# Patient Record
Sex: FEMALE | NOT HISPANIC OR LATINO | URBAN - METROPOLITAN AREA
[De-identification: names, ages, dates, MRNs, and addresses within clinical notes are randomized per-mention and may not be internally consistent; named-entity substitution may affect disease eponyms.]

---

## 2020-08-31 ENCOUNTER — TRANSFERRED RECORDS (OUTPATIENT)
Dept: HEALTH INFORMATION MANAGEMENT | Facility: CLINIC | Age: 35
End: 2020-08-31

## 2020-10-30 ENCOUNTER — TRANSFERRED RECORDS (OUTPATIENT)
Dept: HEALTH INFORMATION MANAGEMENT | Facility: CLINIC | Age: 35
End: 2020-10-30

## 2021-01-08 ENCOUNTER — TRANSFERRED RECORDS (OUTPATIENT)
Dept: HEALTH INFORMATION MANAGEMENT | Facility: CLINIC | Age: 36
End: 2021-01-08

## 2021-03-17 ENCOUNTER — TRANSFERRED RECORDS (OUTPATIENT)
Dept: HEALTH INFORMATION MANAGEMENT | Facility: CLINIC | Age: 36
End: 2021-03-17

## 2021-04-07 ENCOUNTER — TRANSFERRED RECORDS (OUTPATIENT)
Dept: HEALTH INFORMATION MANAGEMENT | Facility: CLINIC | Age: 36
End: 2021-04-07

## 2021-04-14 ENCOUNTER — TELEPHONE (OUTPATIENT)
Dept: ENDOCRINOLOGY | Facility: CLINIC | Age: 36
End: 2021-04-14

## 2021-04-14 NOTE — TELEPHONE ENCOUNTER
Patient is a patient of Dr. Keenan's at [facility]. She is calling to schedule her surgery here because Dr. Keenan is booking out to October in [place]. I told her I could give her a tentative date of June 8 but she asked if she could be moved up if an opening came up.   Her last name is Krystal. Will request the chart be updated with correct spelling.

## 2021-04-28 ENCOUNTER — CARE COORDINATION (OUTPATIENT)
Dept: ENDOCRINOLOGY | Facility: CLINIC | Age: 36
End: 2021-04-28

## 2021-04-28 ENCOUNTER — PREP FOR PROCEDURE (OUTPATIENT)
Dept: ENDOCRINOLOGY | Facility: CLINIC | Age: 36
End: 2021-04-28

## 2021-04-28 ENCOUNTER — TELEPHONE (OUTPATIENT)
Dept: ENDOCRINOLOGY | Facility: CLINIC | Age: 36
End: 2021-04-28

## 2021-04-28 ENCOUNTER — PREP FOR PROCEDURE (OUTPATIENT)
Dept: SURGERY | Facility: CLINIC | Age: 36
End: 2021-04-28

## 2021-04-28 DIAGNOSIS — E66.01 MORBID OBESITY (H): Primary | ICD-10-CM

## 2021-04-28 DIAGNOSIS — Z01.818 PREOP TESTING: Primary | ICD-10-CM

## 2021-04-28 DIAGNOSIS — E66.01 MORBID OBESITY (H): ICD-10-CM

## 2021-04-28 RX ORDER — CEFAZOLIN SODIUM IN 0.9 % NACL 3 G/100 ML
3 INTRAVENOUS SOLUTION, PIGGYBACK (ML) INTRAVENOUS
Status: CANCELLED | OUTPATIENT
Start: 2021-04-28

## 2021-04-28 NOTE — PROGRESS NOTES
"Tasklist updated.    Bariatric Task List  Status:  Is patient a candidate for bariatric surgery?:  patient is a candidate for bariatric surgery -     Cleared to schedule surgeon consult?:  cleared to schedule surgeon consult -     Status:  surgery evaluation in process -     Surgeon: Dr Augusto Keenan -     Tentative surgery month/year: 5/11/2021 -        Insurance: Insurance:  [employer] -        Patient Info: Initial Weight:  235 -     Date of Initial Weight/Height:  10/30/2020 -     Goal Weight (lbs):  225 -     Required Weight Loss:  10 -     Surgery Type:  sleeve gastrectomy -        Dietician Visits: Structured weight loss required by insurance?:  structured weight loss required -     Dietician Visit 1:  Completed - 10/30/20   Dietician Visit 2:  Completed - 11/23/20   Dietician Visit 3:  Completed - 12/23/20   Dietician Visit 4:  Completed - 1/28/21   Dietician Visit 5:  Completed - 2/12/21   Dietician Visit 6:  Completed - 3/12/21   Dietician Visit additional:    - Monthly until at goal weight or for postop diet teaching. Call [phone] to schedule. bks      Psychological Evaluation: Psych eval:  Completed - 3/17/21 Dr Verdin - cleared. bks      Lab Work: Complete Blood Count:  Completed - 10/30/20 done   Comprehensive Metabolic Panel:  Needed -     Vitamin D:  Completed - 10/30/20 done   Hgb A1c:  Needed -     PTH:  Needed -     Other:  Needed - lipids      PCP: PCP letter of support:  Completed -  1/8/21 PCP ltr of support Rose Mary Evans PA-C, in Epic.      Patient Education:  Information Session:    -     Given \"Making your decision\" handout?:    -     Given support group information?:    -     Attended support group?:    -     Support plan in place?:    -        Additional Surgery Requirements: Other Requirements:  covide test 4 days before surgery. Call [phone] to schedule closer to home. bks -     Other Requirements:  The Contact Center to call you to schedule the postop appts for 1 week and 31+ " days. bks -        Final Tasks:  Before surgery online class:  Needed -     Before surgery online class website link:  [email or web address]   After surgery online class:  Needed -     After surgery online class website link:  [email or web address]   Nurse visit for weigh-in and information:  Needed -     Pre-assessment clinic visit with anesthesia team for H&P:  Needed - Call [phone] to schedule. bks   Final labs (Hgb, plt, T&S, UA):  Needed -        Notes: Please register for the Get Well Loop when you get an email invitation and a surgery date.     The Get Well Loop will give you information via email or text messages that can help you be more successful before and after surgery.  It can also help ans,wer any questions you may have.    Get Well Loop Information  [email or web address]    Thank you,  Your Comprehensive Weight Management Care Team  Call [place] ph: [phone] for appointments or to leave messages   -

## 2021-04-28 NOTE — TELEPHONE ENCOUNTER
I gave patient a tentative date of June 8 for a sleeve gastrectomy with Dr. Keenan. Patient requested to be moved up if at all possible because she is planning a trip in July. Left VM message that I have an opening on May 11 and requested patient to call me if she could take that date. She would have to get a pre-op H&P with her primary care provider because the soonest I can get her in with PAC is May 14. My direct call back number left.

## 2021-04-29 ENCOUNTER — TELEPHONE (OUTPATIENT)
Dept: ENDOCRINOLOGY | Facility: CLINIC | Age: 36
End: 2021-04-29

## 2021-04-29 DIAGNOSIS — E66.01 MORBID OBESITY (H): Primary | ICD-10-CM

## 2021-04-29 DIAGNOSIS — Z11.59 ENCOUNTER FOR SCREENING FOR OTHER VIRAL DISEASES: ICD-10-CM

## 2021-04-29 NOTE — TELEPHONE ENCOUNTER
I entered the incorrect date for patient to get the COVID testing done, should be done Friday, May 7 not May 11.

## 2021-04-29 NOTE — TELEPHONE ENCOUNTER
Patient called back with an  stating she would like to take the surgery date of 5/11 for sleeve gastrectomy with Dr. Keenan. Called [facility] to get fax number, [phone], patient would like to get these drawn today, however, due to the lateness of getting lab faxed over may have to do tomorrow instead.   Discussed the need for COVID-19 testing on Friday, May 11 at [place] as well. This is 4 days before surgery date.

## 2021-04-30 ENCOUNTER — TRANSFERRED RECORDS (OUTPATIENT)
Dept: HEALTH INFORMATION MANAGEMENT | Facility: CLINIC | Age: 36
End: 2021-04-30

## 2021-05-03 NOTE — PROGRESS NOTES
"Karina Rice is a 36 year old female who is being evaluated via a billable telephone visit.     The patient has been notified of following:     \"This telephone visit will be conducted via a call between you and your physician/provider. We have found that certain health care needs can be provided without the need for a physical exam.  This service lets us provide the care you need with a short phone conversation.  If a prescription is necessary we can send it directly to your pharmacy.  If lab work is needed we can place an order for that and you can then stop by our lab to have the test done at a later time.    Telephone visits are billed at different rates depending on your insurance coverage. During this emergency period, for some insurers they may be billed the same as an in-person visit.  Please reach out to your insurance provider with any questions.    If during the course of the call the physician/provider feels a telephone visit is not appropriate, you will not be charged for this service.\"    Patient has given verbal consent for Telephone visit?  Yes    What phone number would you like to be contacted at? [phone]    How would you like to obtain your AVS? Mail    Phone call duration: 58 minutes    During this virtual visit the patient is located in [place], patient verifies this as the location during the entirety of this visit.     Bariatric Nutrition Consultation Note    Reason For Visit: Nutrition assessment    Karina Rice is a 36 year old presenting today for bariatric nutrition consult and nutrition education regarding clear and low-fat full liquid diet for post-bariatric surgery.   Pt is interested in laparoscopic sleeve gastrectomy with Dr. Keenan on May 11, 2021.  Patient is accompanied by Turkmen  via phone.  Patient of Dr. Keenan's at [facility]. She has completed required nutrition visits prior to surgery. Pt referred by Dr. Keenan.     Patient with Co-morbidities of obesity " including:  none    ANTHROPOMETRICS:  Initial Consult Weight (10/30/20): 235 lbs with BMI of 42.98  Current Weight: 232 lbs    Required weight loss goal pre-op: 10 lbs from initial consult weight (goal weight 225 lbs or less before surgery)    Diet History:  Pt reports no history of receiving clear and low-fat full liquid diet education after bariatric surgery in the past.  Has a protein powder at home (140 anthony/serving) she can use for before and after surgery.   Pt does not eat pork, or pork products (gelatin).  Pt states she had gotten her weight down to 225 lbs, thought surgery was going to be post-poned and gained a few lbs back.  Pt confident she will be able to lose a few more lbs prior to surgery.     SUPPLEMENT INFORMATION:  Vitamin D3 to treat mild vitamin D deficiency. Collagen supplement.     NUTRITION DIAGNOSIS:  Obesity r/t long history of self-monitoring deficit and excessive energy intake aeb BMI >30 kg/m2.  And  Food- and Nutrition-related knowledge deficit r/t lack of prior exposure to information AEB pt unable to verbalize main points of bariatric clear and low-fat full liquid diet.    INTERVENTION:  Intervention Provided/Education Provided:   Advised pt to start the pre-op Full Liquid Diet. Provided instruction on this diet.   Advised pt to stop vitamin/mineral supplement intake now (one week before surgery).    Provided instruction on bariatric clear and low-fat full liquid diets.  Provided the following handouts: Diet Guidelines for Bariatric Surgery, Stage 1-5 post-op diets, Keeping Track of Your Fluids, list of recommended vitamin/mineral supplementation after SG surgery and RD contact information. Mailed pt handouts with before and after surgery goals.     Patient Understanding: good  Expected Compliance: good    Goals before surgery:  Start the Full Liquid Diet: 5 protein shakes per day  - water, or other calorie-free beverages between.     Goals after surgery:   1. Follow the bariatric  post-op diet advancement schedule:  - Bariatric clear liquid diet the day before surgery and day of surgery (while in the hospital).   - Bariatric low-fat full liquid diet on post-op days 1 through 13 (2 weeks).   2. Sip on 48-64 oz (or greater) fluids daily, recording intake to help stay on-track.  - Drink at least 1-2 oz of fluid every 15-30 min.  3. Stop multivitamin at surgery. We will discuss starting a chewable multivitamin at the one week post-op visit.   Chewable Multivitamin without gelatin to start after surgery:  Bariatric Fusion - Complete Chewable Multivitamin with Iron   - 2 chews per day provides 22 mg iron  - https://www.[email or web address]/collections/complete-chewable/products/mixed-berry-complete-chewable-multivitamin  Post-op Diet Schedule:  Clear Liquids (Stage 1): 5/10 - the day before surgery  Full Liquid Diet (Stage 2): 5/12 - 5/24 - 2 weeks  Pureed Diet (Stage 3): 5/25 - 6/7 - 2 weeks  Soft Diet (Stage 4): 6/8 - 7/5 - 1 month  Regular (Stage 5): 7/6     Meal Replacement Shake Options:   *Protein Shake Criteria: no more than 210 Calories, at least 20 grams of protein, and less than 10 grams of sugar   [facility] smoothie (160 Calories, 20 g protein)   Premier Protein (160 Calories, 30 g protein)  Slim Fast Advanced Nutrition (180 Calories, 20 g protein)  Muscle Milk, lactose-free, 17 oz bottle (210 Calories, 30 g protein)  Integrated Supplements, no artificial sugars (110 Calories, 20 g protein)  Genepro, unflavored protein powder (60 Calories, 30 g protein)  Boost/Ensure Max (160 calories, 30 gm protein)   [facility] Core Power (170 calories, 26 gm protein)  Post-op Diet Handouts:  Diet Guidelines after Weight-loss Surgery  [email or web address]     Your Stage 1 Diet: Clear Liquids  [email or web address]     Your Stage 2 Diet: Low-fat Full Liquids  [email or web address]     Your Stage 3 Diet: Pureed Foods  [email or web address]     Pureed  Pleasures  [email or web address]    Your Stage 4 Diet: Soft Foods  [email or web address]    Your Stage 5 Diet: Regular Foods  [email or web address]    Supplements after Weight Loss Surgery  [email or web address]     Keeping Track of Fluids  [email or web address]      Tia Bass RD, LD

## 2021-05-04 ENCOUNTER — VIRTUAL VISIT (OUTPATIENT)
Dept: ENDOCRINOLOGY | Facility: CLINIC | Age: 36
End: 2021-05-04
Payer: COMMERCIAL

## 2021-05-04 DIAGNOSIS — Z71.3 NUTRITIONAL COUNSELING: Primary | ICD-10-CM

## 2021-05-04 DIAGNOSIS — E66.9 OBESITY: ICD-10-CM

## 2021-05-04 PROCEDURE — 97803 MED NUTRITION INDIV SUBSEQ: CPT | Mod: TEL | Performed by: DIETITIAN, REGISTERED

## 2021-05-04 NOTE — LETTER
"5/4/2021       RE: Karina Rice  [address]  [facility] [address]     Dear Colleague,    Thank you for referring your patient, Karina Rice, to the [facility] [place] [facility]. Please see a copy of my visit note below.    Karina Rice is a 36 year old female who is being evaluated via a billable telephone visit.     The patient has been notified of following:     \"This telephone visit will be conducted via a call between you and your physician/provider. We have found that certain health care needs can be provided without the need for a physical exam.  This service lets us provide the care you need with a short phone conversation.  If a prescription is necessary we can send it directly to your pharmacy.  If lab work is needed we can place an order for that and you can then stop by our lab to have the test done at a later time.    Telephone visits are billed at different rates depending on your insurance coverage. During this emergency period, for some insurers they may be billed the same as an in-person visit.  Please reach out to your insurance provider with any questions.    If during the course of the call the physician/provider feels a telephone visit is not appropriate, you will not be charged for this service.\"    Patient has given verbal consent for Telephone visit?  Yes    What phone number would you like to be contacted at? [phone]    How would you like to obtain your AVS? Mail    Phone call duration: 58 minutes    During this virtual visit the patient is located in [place], patient verifies this as the location during the entirety of this visit.     Bariatric Nutrition Consultation Note    Reason For Visit: Nutrition assessment    Karina Rice is a 36 year old presenting today for bariatric nutrition consult and nutrition education regarding clear and low-fat full liquid diet for post-bariatric " surgery.   Pt is interested in laparoscopic sleeve gastrectomy with Dr. Keenan on May 11, 2021.  Patient is accompanied by Nepali  via phone.  Patient of Dr. Keenan's at [facility]. She has completed required nutrition visits prior to surgery. Pt referred by Dr. Keenan.     Patient with Co-morbidities of obesity including:  none    ANTHROPOMETRICS:  Initial Consult Weight (10/30/20): 235 lbs with BMI of 42.98  Current Weight: 232 lbs    Required weight loss goal pre-op: 10 lbs from initial consult weight (goal weight 225 lbs or less before surgery)    Diet History:  Pt reports no history of receiving clear and low-fat full liquid diet education after bariatric surgery in the past.  Has a protein powder at home (140 anthony/serving) she can use for before and after surgery.   Pt does not eat pork, or pork products (gelatin).  Pt states she had gotten her weight down to 225 lbs, thought surgery was going to be post-poned and gained a few lbs back.  Pt confident she will be able to lose a few more lbs prior to surgery.     SUPPLEMENT INFORMATION:  Vitamin D3 to treat mild vitamin D deficiency. Collagen supplement.     NUTRITION DIAGNOSIS:  Obesity r/t long history of self-monitoring deficit and excessive energy intake aeb BMI >30 kg/m2.  And  Food- and Nutrition-related knowledge deficit r/t lack of prior exposure to information AEB pt unable to verbalize main points of bariatric clear and low-fat full liquid diet.    INTERVENTION:  Intervention Provided/Education Provided:   Advised pt to start the pre-op Full Liquid Diet. Provided instruction on this diet.   Advised pt to stop vitamin/mineral supplement intake now (one week before surgery).    Provided instruction on bariatric clear and low-fat full liquid diets.  Provided the following handouts: Diet Guidelines for Bariatric Surgery, Stage 1-5 post-op diets, Keeping Track of Your Fluids, list of recommended vitamin/mineral supplementation after SG surgery  and RD contact information. Mailed pt handouts with before and after surgery goals.     Patient Understanding: good  Expected Compliance: good    Goals before surgery:  Start the Full Liquid Diet: 5 protein shakes per day  - water, or other calorie-free beverages between.     Goals after surgery:   1. Follow the bariatric post-op diet advancement schedule:  - Bariatric clear liquid diet the day before surgery and day of surgery (while in the hospital).   - Bariatric low-fat full liquid diet on post-op days 1 through 13 (2 weeks).   2. Sip on 48-64 oz (or greater) fluids daily, recording intake to help stay on-track.  - Drink at least 1-2 oz of fluid every 15-30 min.  3. Stop multivitamin at surgery. We will discuss starting a chewable multivitamin at the one week post-op visit.   Chewable Multivitamin without gelatin to start after surgery:  Bariatric Fusion - Complete Chewable Multivitamin with Iron   - 2 chews per day provides 22 mg iron  - https://www.bariatricfusion.com/collections/complete-chewable/products/mixed-berry-complete-chewable-multivitamin  Post-op Diet Schedule:  Clear Liquids (Stage 1): 5/10 - the day before surgery  Full Liquid Diet (Stage 2): 5/12 - 5/24 - 2 weeks  Pureed Diet (Stage 3): 5/25 - 6/7 - 2 weeks  Soft Diet (Stage 4): 6/8 - 7/5 - 1 month  Regular (Stage 5): 7/6     Meal Replacement Shake Options:   *Protein Shake Criteria: no more than 210 Calories, at least 20 grams of protein, and less than 10 grams of sugar   [facility] smoothie (160 Calories, 20 g protein)   Premier Protein (160 Calories, 30 g protein)  Slim Fast Advanced Nutrition (180 Calories, 20 g protein)  Muscle Milk, lactose-free, 17 oz bottle (210 Calories, 30 g protein)  Integrated Supplements, no artificial sugars (110 Calories, 20 g protein)  Genepro, unflavored protein powder (60 Calories, 30 g protein)  Boost/Ensure Max (160 calories, 30 gm protein)   [employer] Core Power (170 calories, 26 gm protein)  Post-op Diet  Handouts:  Diet Guidelines after Weight-loss Surgery  [email or web address]     Your Stage 1 Diet: Clear Liquids  [email or web address]     Your Stage 2 Diet: Low-fat Full Liquids  [email or web address]     Your Stage 3 Diet: Pureed Foods  [email or web address]     Pureed Pleasures  [email or web address]    Your Stage 4 Diet: Soft Foods  [email or web address]    Your Stage 5 Diet: Regular Foods  [email or web address]    Supplements after Weight Loss Surgery  [email or web address]     Keeping Track of Fluids  [email or web address]      Tia Bass RD, LD          Again, thank you for allowing me to participate in the care of your patient.      Sincerely,    Tia Bass RD

## 2021-05-04 NOTE — LETTER
Date:May 7, 2021      Patient was self referred, no letter generated. Do not send.        [facility] Information

## 2021-05-04 NOTE — PATIENT INSTRUCTIONS
Mao Collins,    Follow-up with RD one week after surgery.     Thank you,    Tia Bass, RD, LD  If you would like to schedule or reschedule an appointment with the RD, please call [phone]    Nutrition Goals  Goals before surgery:  Start the Full Liquid Diet: 5 protein shakes per day  - water, or other calorie-free beverages between.     Goals after surgery:   1. Follow the bariatric post-op diet advancement schedule:  - Bariatric clear liquid diet the day before surgery and day of surgery (while in the hospital).   - Bariatric low-fat full liquid diet on post-op days 1 through 13 (2 weeks).   2. Sip on 48-64 oz (or greater) fluids daily, recording intake to help stay on-track.  - Drink at least 1-2 oz of fluid every 15-30 min.  3. Stop multivitamin at surgery. We will discuss starting a chewable multivitamin at the one week post-op visit.     Chewable Multivitamin without gelatin to start after surgery:  Bariatric Fusion - Complete Chewable Multivitamin with Iron   - 2 chews per day provides 22 mg iron  - https://www.bariatricfusion.com/collections/complete-chewable/products/mixed-berry-complete-chewable-multivitamin    Post-op Diet Schedule:  Clear Liquids (Stage 1): 5/10 - the day before surgery  Full Liquid Diet (Stage 2): 5/12 - 5/24 - 2 weeks  Pureed Diet (Stage 3): 5/25 - 6/7 - 2 weeks  Soft Diet (Stage 4): 6/8 - 7/5 - 1 month  Regular (Stage 5): 7/6     Meal Replacement Shake Options:   *Protein Shake Criteria: no more than 210 Calories, at least 20 grams of protein, and less than 10 grams of sugar   [facility] smoothie (160 Calories, 20 g protein)   Premier Protein (160 Calories, 30 g protein)  Slim Fast Advanced Nutrition (180 Calories, 20 g protein)  Muscle Milk, lactose-free, 17 oz bottle (210 Calories, 30 g protein)  Integrated Supplements, no artificial sugars (110 Calories, 20 g protein)  Genepro, unflavored protein powder (60 Calories, 30 g protein)  Boost/Ensure Max (160 calories, 30 gm  protein)   [employer] Core Power (170 calories, 26 gm protein)\    Post-op Diet Handouts:  Diet Guidelines after Weight-loss Surgery  [email or web address]     Your Stage 1 Diet: Clear Liquids  [email or web address]     Your Stage 2 Diet: Low-fat Full Liquids  [email or web address]     Your Stage 3 Diet: Pureed Foods  [email or web address]     Pureed Pleasures  [email or web address]    Your Stage 4 Diet: Soft Foods  [email or web address]    Your Stage 5 Diet: Regular Foods  [email or web address]    Supplements after Weight Loss Surgery  [email or web address]     Keeping Track of Fluids  [email or web address]      Interested in working with a health ?  Health coaches work with you to improve your overall health and wellbeing.  They look at the whole person, and may involve discussion of different areas of life, including, but not limited to the four pillars of health (sleep, exercise, nutrition, and stress management). Discuss with your care team if you would like to start working a health .    Health Coaching-3 Pack:    $99 for three health coaching visits    Visits may be done in person or via phone    Coaching is a partnership between the  and the client; Coaches do not prescribe or diagnose    Coaching helps inspire the client to reach his/her personal goals      Virtual Support Groups are Available             Healthy Lifestyle Support Group      All are welcome!     Facilitator: Zuleyma Mayberry, Certified Health     - Meets once a month on a Friday from 12:30pm to 1:30pm.  - Due to Covid-19 she is doing this Support Group virtually using Microsoft Teams.  - 60 minutes of small group guided discussion, support and resources.  - Please email Zuleyma directly at [email or web address] to receive monthly invitations.  - If you opt to participate in individual health coaching sessions or for general questions, contact Zuleyma via  email.  - Zuleyma will send out invites for each session, so the phone number and the conference ID may change for each session.     2020 Meetings      December 18 - Open Forum      2021 Meetings      January 29 - How to Stay Active and Healthy during the Winter Months   February 26  - Reading Food Labels: What do I Need to Know?, Guest Speaker: Tia Bass RD   March 19  - Finding Health, Happiness and Confidence at Every Size; Guest Speaker, Health Psychologist Fellow  April 30 -  Healthy Eating on a Budget, Guest Speaker: Stephanie Sanchez RD   May 21 - Open Forum

## 2021-05-05 ENCOUNTER — TELEPHONE (OUTPATIENT)
Dept: ENDOCRINOLOGY | Facility: CLINIC | Age: 36
End: 2021-05-05

## 2021-05-05 NOTE — TELEPHONE ENCOUNTER
Receive Pre-op Report outside records from Provider Bryson Acuna MD Family Medicine  [facility] sent to scan Archive and sent to Raeann Harp Amy

## 2021-05-07 ENCOUNTER — TELEPHONE (OUTPATIENT)
Dept: ENDOCRINOLOGY | Facility: CLINIC | Age: 36
End: 2021-05-07

## 2021-05-07 ENCOUNTER — ALLIED HEALTH/NURSE VISIT (OUTPATIENT)
Dept: SURGERY | Facility: CLINIC | Age: 36
End: 2021-05-07
Payer: COMMERCIAL

## 2021-05-07 VITALS — DIASTOLIC BLOOD PRESSURE: 84 MMHG | HEART RATE: 95 BPM | SYSTOLIC BLOOD PRESSURE: 124 MMHG | OXYGEN SATURATION: 97 %

## 2021-05-07 DIAGNOSIS — E66.01 MORBID OBESITY (H): ICD-10-CM

## 2021-05-07 DIAGNOSIS — E66.01 MORBID OBESITY (H): Primary | ICD-10-CM

## 2021-05-07 LAB
SARS-COV-2 RNA RESP QL NAA+PROBE: NORMAL
SPECIMEN SOURCE: NORMAL

## 2021-05-07 PROCEDURE — U0003 INFECTIOUS AGENT DETECTION BY NUCLEIC ACID (DNA OR RNA); SEVERE ACUTE RESPIRATORY SYNDROME CORONAVIRUS 2 (SARS-COV-2) (CORONAVIRUS DISEASE [COVID-19]), AMPLIFIED PROBE TECHNIQUE, MAKING USE OF HIGH THROUGHPUT TECHNOLOGIES AS DESCRIBED BY CMS-2020-01-R: HCPCS | Performed by: PHYSICIAN ASSISTANT

## 2021-05-07 PROCEDURE — U0005 INFEC AGEN DETEC AMPLI PROBE: HCPCS | Performed by: PHYSICIAN ASSISTANT

## 2021-05-07 RX ORDER — ACETAMINOPHEN 500 MG
500 TABLET ORAL
Status: ON HOLD | COMMUNITY
End: 2021-05-12

## 2021-05-07 RX ORDER — ALBUTEROL SULFATE 0.83 MG/ML
SOLUTION RESPIRATORY (INHALATION)
COMMUNITY
Start: 2020-06-22 | End: 2023-04-17

## 2021-05-07 ASSESSMENT — PAIN SCALES - GENERAL: PAINLEVEL: NO PAIN (0)

## 2021-05-07 NOTE — PATIENT INSTRUCTIONS
Karina Rice,    Below is a recap of our conversation regarding your upcoming Sleeve Gastrectomy on 5/11/21.    DAY BEFORE YOUR SURGERY     - Follow a clear liquid diet.  Stay away from red liquids and liquids with pulp.     - Ensure you are well hydrated all day!     - Nothing to eat after midnight.  You may have clear liquids up to 3 hours before your surgery.      - Take your medications as directed from the PAC clinic.    SHOWER    - Follow instructions for pre-op shower using the required soap (chlorhexidine).      - You will take a shower the night before surgery and a shower the morning of surgery.  The soap can be very drying.  Do not put lotion on.    TRANSPORTATION & CARE    - You will need a  to take you to the hospital the day of surgery.    - You will need a  to pick you up from the hospital the day of discharge (usually the afternoon/evening the day after surgery).    - You will need someone to stay with you for the first 1-2 days after surgery, especially if you are taking prescription pain medicine.      AFTER SURGERY    MEDICATIONS     - Depending on the size and number of medications you take, you may need to space/change the time you take your medication so you do not overfill your stomach.   - Make sure you follow-up with your primary provider to make medication changes needed.   - If you have diabetes, follow-up with your provider that orders your diabetes medications and check your blood sugar regularly.      You will receive the following prescriptions at discharge (unless you are allergic to or have other reasons not to take them):      Prilosec (omeprazole): This medication is for control of stomach acid.  Open the capsule and put in water, protein shake or other approve liquid.  Take as directed.  (Please disregard the prescription bottle instructions to not open the capsule).  If you are currently on a medication for GERD or Reflux, you will just continue that  medication.    Levsin (hyoscyamine): This medication is to help control spasms/cramping in the stomach and intestines.  Take as needed for cramping.      Zofran (ondansetron):  This medication is for nausea.  The medication is to be placed on the tongue and allowed to dissolve. Take as directed for nausea.    Senna: This medication is a stool softener:  Take as directed.  You may cut it in half to make it easier to swallow.  It is important to take this medication if you are taking a narcotic pain medicine as the pain medication can be constipating.  Senna can be purchased over-the-counter.      Narcotic pain medicine: Take as directed for pain.  If you are not having a lot of pain, you can take Tylenol or Extra Strength Tylenol per the bottle instructions.  The pain medicine can cause constipation.  Do not drive while taking narcotic pain medication.      PAIN CONTROL    - Take your pain medicine as needed, as directed.    - You can use Tylenol or Tylenol Extra Strength if you are not having a lot of pain and also to supplement during the day.  DO NOT TAKE NSAIDS: IBUPROFEN, ASPIRIN OR NAPROXEN.    - Use an ice pack on the incisions for the first 24 - 48 hours:  Use a barrier between the ice pack and the skin.  Do not leave the ice on longer than 20 minutes at each time.      - Change positions frequently.  Walking around once an hour will also help.    - You may also try a heating pad on your abdomen to help soothe cramping.  Use a barrier between the heating pad and your skin.  Do not leave on longer than 20 minutes at each time.    HYDRATION    - You will be provided with a small medicine cup after surgery.  It holds one ounce of fluid (30 mL).  You need to drink one of these (1 oz) every 10 to 15 .    - Your goal is 48 to 64 ounces each day.  This amount will help prevent dehydration.      - Keep a tally sheet next to you and estephania each time you drink one ounce of fluid.  You should have at least 4-6 marks for  each hour.    - Try keeping a water bottle by your bed.  Drink a little before going to sleep, if you wake in the middle of the night, and in the morning before getting out of bed.    - Keep an eye on your urine.  It is a good indicator of your hydration status.  Your urine should be clear yellow or clear light yellow.      DIET    For Dr. Keenan Patients:     - You will be following the Stage 2 - Full Liquid diet upon discharge.  Ensure you have a variety of proper full liquids at home to support you in taking in the proper nutrition.     - Please refer to the Stage 2 - Full Liquid diet handout provided by the Dietician.    For Dr. Jenkins and Dr. Florentino Patients:     - You will be following the Stage 1 - Clear Liquid diet upon discharge.  Ensure you have a variety of proper clear liquids at home to support you in taking in the proper nutrition.     - Please refer to the Stage 1 - Cear Liquid diet handout provided by the Dietician.    BREATHING AND ACTIVITY    - Use your incentive spirometer each hour while awake.  Sitting up or standing, take 5 - 10 slow, deep breaths each time, focusing on expanding your lungs.  This should be done for the first couple of weeks after surgery.    - Get up and walk around each hour while you are awake - at least 10 minutes.  Walking will help with circulation, bowel regulation and will help you feel better.    -  Do not lift anything greater than 10 lb for the first 4 weeks.    WOUND  CARE  You may have surgical glue or steri-strips over your incision after surgery.    - Surgical glue: looks like a clear film over your incisions and will wear off a little at a time over the first 7-10 days after surgery.      - Steri-Strips: Adhesive strips of tape over your incisions.  They will fall off over the first 7-10 days after surgery.    Showering: you may shower the day you get home unless your surgeon tells you differently.    - Wash gently around incisions with warm soapy water, rinse  well, and gently pat dry.    - No tub baths until all incisions are healed.      FOLLOW-UP CALL    - You will receive a post-op phone call two days after surgery to check in and see how you are doing (If your surgery is on a Friday, your phone call will be on the Monday following your surgery).  You can always send us a message via [employer] and the Get Well Loop rachel.    EmergentDetection  [email or web address]    - Track your fluid intake!    - Reminders to set up follow up appointments.    - Communicate with nursing staff.    - Please use [employer]Tide for any concerns regarding your health.    Please let us know if you have any additional questions.    Sincerely,    Kim James RN, BSN  RN Care Coordinator  Bariatric Surgery and Comprehensive Weight Management  Phone: [phone]

## 2021-05-07 NOTE — TELEPHONE ENCOUNTER
Called  Services to request an Korean  call patient to discuss that her son cannot come in with his mom for her surgery with Dr. Keenan on 5/11/21. Her son is not 18 years old. This is hospital policy. Patient understands.

## 2021-05-07 NOTE — LETTER
May 7, 2021      Marliclaudette MALIK Aumraan  [address]  [facility] [address]      Below is a recap of our conversation regarding your upcoming Sleeve Gastrectomy on 5/11/21.    DAY BEFORE YOUR SURGERY     - Follow a clear liquid diet.  Stay away from red liquids and liquids with pulp.     - Ensure you are well hydrated all day!     - Nothing to eat after midnight.  You may have clear liquids up to 3 hours before your surgery.      - Take your medications as directed from the PAC clinic.    SHOWER    - Follow instructions for pre-op shower using the required soap (chlorhexidine).      - You will take a shower the night before surgery and a shower the morning of surgery.  The soap can be very drying.  Do not put lotion on.    TRANSPORTATION & CARE    - You will need a  to take you to the hospital the day of surgery.    - You will need a  to pick you up from the hospital the day of discharge (usually the afternoon/evening the day after surgery).    - You will need someone to stay with you for the first 1-2 days after surgery, especially if you are taking prescription pain medicine.      AFTER SURGERY    MEDICATIONS     - Depending on the size and number of medications you take, you may need to space/change the time you take your medication so you do not overfill your stomach.   - Make sure you follow-up with your primary provider to make medication changes needed.   - If you have diabetes, follow-up with your provider that orders your diabetes medications and check your blood sugar regularly.      You will receive the following prescriptions at discharge (unless you are allergic to or have other reasons not to take them):      Prilosec (omeprazole): This medication is for control of stomach acid.  Open the capsule and put in water, protein shake or other approve liquid.  Take as directed.  (Please disregard the prescription bottle instructions to not open the capsule).  If you are currently on a medication  for GERD or Reflux, you will just continue that medication.    Levsin (hyoscyamine): This medication is to help control spasms/cramping in the stomach and intestines.  Take as needed for cramping.      Zofran (ondansetron):  This medication is for nausea.  The medication is to be placed on the tongue and allowed to dissolve. Take as directed for nausea.    Senna: This medication is a stool softener:  Take as directed.  You may cut it in half to make it easier to swallow.  It is important to take this medication if you are taking a narcotic pain medicine as the pain medication can be constipating.  Senna can be purchased over-the-counter.      Narcotic pain medicine: Take as directed for pain.  If you are not having a lot of pain, you can take Tylenol or Extra Strength Tylenol per the bottle instructions.  The pain medicine can cause constipation.  Do not drive while taking narcotic pain medication.      PAIN CONTROL    - Take your pain medicine as needed, as directed.    - You can use Tylenol or Tylenol Extra Strength if you are not having a lot of pain and also to supplement during the day.  DO NOT TAKE NSAIDS: IBUPROFEN, ASPIRIN OR NAPROXEN.    - Use an ice pack on the incisions for the first 24 - 48 hours:  Use a barrier between the ice pack and the skin.  Do not leave the ice on longer than 20 minutes at each time.      - Change positions frequently.  Walking around once an hour will also help.    - You may also try a heating pad on your abdomen to help soothe cramping.  Use a barrier between the heating pad and your skin.  Do not leave on longer than 20 minutes at each time.    HYDRATION    - You will be provided with a small medicine cup after surgery.  It holds one ounce of fluid (30 mL).  You need to drink one of these (1 oz) every 10 to 15 .    - Your goal is 48 to 64 ounces each day.  This amount will help prevent dehydration.      - Keep a tally sheet next to you and estephania each time you drink one ounce of  fluid.  You should have at least 4-6 marks for each hour.    - Try keeping a water bottle by your bed.  Drink a little before going to sleep, if you wake in the middle of the night, and in the morning before getting out of bed.    - Keep an eye on your urine.  It is a good indicator of your hydration status.  Your urine should be clear yellow or clear light yellow.      DIET    For Dr. Keenan Patients:     - You will be following the Stage 2 - Full Liquid diet upon discharge.  Ensure you have a variety of proper full liquids at home to support you in taking in the proper nutrition.     - Please refer to the Stage 2 - Full Liquid diet handout provided by the Dietician.    For Dr. Jenkins and Dr. Florentino Patients:     - You will be following the Stage 1 - Clear Liquid diet upon discharge.  Ensure you have a variety of proper clear liquids at home to support you in taking in the proper nutrition.     - Please refer to the Stage 1 - Cear Liquid diet handout provided by the Dietician.    BREATHING AND ACTIVITY    - Use your incentive spirometer each hour while awake.  Sitting up or standing, take 5 - 10 slow, deep breaths each time, focusing on expanding your lungs.  This should be done for the first couple of weeks after surgery.    - Get up and walk around each hour while you are awake - at least 10 minutes.  Walking will help with circulation, bowel regulation and will help you feel better.    -  Do not lift anything greater than 10 lb for the first 4 weeks.    WOUND  CARE  You may have surgical glue or steri-strips over your incision after surgery.    - Surgical glue: looks like a clear film over your incisions and will wear off a little at a time over the first 7-10 days after surgery.      - Steri-Strips: Adhesive strips of tape over your incisions.  They will fall off over the first 7-10 days after surgery.    Showering: you may shower the day you get home unless your surgeon tells you differently.    - Wash gently  around incisions with warm soapy water, rinse well, and gently pat dry.    - No tub baths until all incisions are healed.      FOLLOW-UP CALL    - You will receive a post-op phone call two days after surgery to check in and see how you are doing (If your surgery is on a Friday, your phone call will be on the Monday following your surgery).  You can always send us a message via [employer] and the Get Well Loop rachel.    CNG-One  [email or web address]    - Track your fluid intake!    - Reminders to set up follow up appointments.    - Communicate with nursing staff.    - Please use [employer] for any concerns regarding your health.    Please let us know if you have any additional questions.    Kim James RN, BSN  RN Care Coordinator  Bariatric Surgery and Comprehensive Weight Management  Phone: [phone]

## 2021-05-07 NOTE — PROGRESS NOTES
This patient is having sleeve gastrectomy by Dr. Keenan.    The following handouts were reviewed with the patient :  Before Your Surgery, Patient Checklist, Weight Loss Surgery Pre-operative Class, Preop Recommendations Quick Reference Guide, History and Physical, Medications to Avoid, Shower or Bathing Before Surgery, Bowel Preparation, Powerful Choices and [place] [facility] Directive.  Questions were addressed and understanding of content was verbalized.  Contact information was provided.    Updated allergies.  Patient stated she had two medications last year when she was in ED and she passed out after they were administered.  When she woke up she had difficulty breathing.  She does not know which of the two caused the reaction.  Chart reviewed showed she had received Toradol and compazine, not Zofran as she had previously thought.    Patient goal weight: 225  Weight today: 227.8     Call [phone] Ignacio Garcia to confirm surgery date   Call [phone] to schedule your pre-operative history and physical with our PAC clinic.      Patient currently taking opioid/narcotic pain medications? No.

## 2021-05-07 NOTE — LETTER
May 7, 2021      Karina MALIK Aumraan  [address]  [facility] [address]      GASTRIC SLEEVE POSTOPERATIVE INSTRUCTIONS           DIET AND FLUIDS       - You will remain on liquids for 2 weeks after surgery. Clear liquids for one week then Full Liquids for one week.  Small sips!  Sip through one ounce every 10-15 minutes and keep track of your intake.           - DO NOT eat and drink at the same time. Drink fluids at least 30 minutes after you eat food (thicker full liquids: yogurt, pudding, cream of wheat/rice).            - You will learn about advancing to a pureed diet at your first postop Dietician appointment.           - Your fluid goal is AT LEAST 48 oz each day.  Warm and room temperature fluids are better tolerated.  If you experience cramping with room temperature fluids, increase the temperature of them to at least body temperature (98.6 degrees).           - Drink slowly. Sip when you are drinking. DO NOT gulp. DO NOTuse a straw, as it may bring air into your stomach.               MEDICATIONS       - Take your Omeprazole in the morning.  Open the capsule and sprinkle the beads in a small amount of liquid or applesauce.  If you are on a different medication for reflux, continue the medication.  If you are on a different proton pump inhibitor medication (PPI), continue to take that as directed.           - Take the Levsin for cramping.  The cramping can be located in the right upper area of your abdomen or at the bottom of the breastbone.  You can take the Levsin every 4 hours. This medication is refillable, if needed.         - Take the Zofran for nausea.  If you are having excessive nausea that is not controlled by the Zofran (odansetron), please notify the office.  This medication is refillable, if needed.              PAIN CONTROL       - Use your pain medication as directed.  If you are not having a lot of pain, you can use Tylenol or Tylenol Extra Strength.          - Use ice packs on your  incisions.  Make sure to use a towel or barrier between the ice pack and your skin.  Do not leave on longer than 20 minutes at a time.          - Use a heating pad on your belly to help soothe muscle cramps.  Use a barrier between the heating pad and your skin.  Do not leave on longer than 20 minutes at a time.          - Press a pillow over your incisions when you need to cough or sneeze.         - DO NOT take any NSAID medications: aspirin, ibuprofen, naproxen, etc.  These can lead to stomach ulcers.               WOUND CARE       - You can shower the day you get home from the hospital.  Let the soap and water run over the incisions.  DO NOT scrub the incisions.  Pat incisions dry.          - You may have bruising around your wounds. This is normal. It will go away on its own. The skin around your incisions may be a little red. This is normal, too.          - DO NOT wear tight clothing that rubs against your incisions while they heal.          - DO NOT soak in a bathtub, swimming pool, or hot tub until your incisions are healed completely, usually around 7-14 days.              BOWELS/CONSTIPATION       - It is not unusual to not have a bowel movement for a few days after surgery.  You should be passing gas each day.           - Keep taking the SENNA until you have had a regular bowel movement and are off the narcotic pain medication.           - If you haven't had a bowel movement by the 4th day after surgery, take one dose of Miralax (according to package directions).  Repeat dose if no results.  If you still don't have any results, use a Fleet Enema.  If still no results, call your provider's office.               ACTIVITY       - Being active soon after surgery will help you recover more quickly.           - During the first week:              -  Use your incentive spirometer every hour, 5 to 10 deep breaths while awake.              - Start walking after surgery. Move around the house and do stairs slowly.               - Try getting up and moving around if you are having some pain in your belly. It may help.              - Make sure your home is set up for your recovery, to prevent falls and to make sure you are safe in the bathroom.              - If you have laparoscopic surgery, you should be able to do most of your regular activities in 2 to 4 weeks.              - DO NOT push, pull or lift anything heavier than 10 - 15 lb for the first 4 weeks after surgery.   - DO NOT push yourself too hard. Increase how much you exercise slowly.           - DO NOT drive or use machinery if you are taking narcotic pain medicine. These medicines will make you drowsy. Driving and using machinery is not safe when you are taking them.         EXERCISE       - The only exercise you can start right after surgery is WALKING.  Walking is good for the gut, the circulation and your breathing!           - Seated Exercises for Arms and Legs (can be done before or after surgery)  [email or web address]           - Exercise Guidelines after Weight Loss Surgery (1st 4-6 weeks): [email or web address]            - Exercises after Weight Loss Surgery (strengthening, when no weightlifting restrictions - after 4-6 weeks) :  [email or web address]                 CALL YOUR PROVIDER IF:        - You have more redness, pain, warmth, swelling, or bleeding around your incision.       - The wound is larger or deeper or looks dark or dried out.       - The drainage from your incision does not decrease in 3 to 5 days or increases.       - The drainage becomes thick, tan or yellow and has a bad smell (pus).       - Your temperature is above 100 F (37.7 C) for more than 4 hours.       - You have pain that your pain medicine is not helping.       - You have chest and/or belly pain.       - You have trouble breathing.       - You have a cough that does not go away.       - You cannot drink or eat.       - You have a fast  heartbeat.       - You are dizzy or lightheaded.       - You are not passing gas and have not had a bowel movement after following instructions above.       - Your stools are loose, or you have diarrhea.       - You are vomiting after eating.

## 2021-05-08 LAB
LABORATORY COMMENT REPORT: NORMAL
SARS-COV-2 RNA RESP QL NAA+PROBE: NEGATIVE
SPECIMEN SOURCE: NORMAL

## 2021-05-10 ENCOUNTER — TELEPHONE (OUTPATIENT)
Dept: ENDOCRINOLOGY | Facility: CLINIC | Age: 36
End: 2021-05-10

## 2021-05-10 ENCOUNTER — ANESTHESIA EVENT (OUTPATIENT)
Dept: SURGERY | Facility: CLINIC | Age: 36
DRG: 621 | End: 2021-05-10
Payer: COMMERCIAL

## 2021-05-10 NOTE — ANESTHESIA PREPROCEDURE EVALUATION
09/29/2020  Alea Terrazas is a 65 y.o., female.  Past Medical History:   Diagnosis Date    Anxiety     Arthritis     Carpal tunnel syndrome     Chronic neck and back pain     Hypertension      Past Surgical History:   Procedure Laterality Date    CARPAL TUNNEL RELEASE Left     CARPAL TUNNEL RELEASE Right 9/5/2019    Procedure: RELEASE, CARPAL TUNNEL;  Surgeon: Billy Meyer MD;  Location: [facility];  Service: Orthopedics;  Laterality: Right;  Confirmed anesthesia type with CRNA.    CERVICAL FUSION  2016, 2017    ROTATOR CUFF REPAIR Right 2015    TRIGGER FINGER RELEASE Right 9/5/2019    Procedure: RELEASE, TRIGGER FINGER;  Surgeon: Billy Meyer MD;  Location: [facility];  Service: Orthopedics;  Laterality: Right;  right thumb   Confirmed anesthesia type with CRNA.         Anesthesia Evaluation    I have reviewed the Patient Summary Reports.    I have reviewed the Nursing Notes. I have reviewed the NPO Status.   I have reviewed the Medications.     Review of Systems  Anesthesia Hx:  No problems with previous Anesthesia Most recent surgery 9/2019 GA w LMA. NAAC. History of prior surgery of interest to airway management or planning: cervical fusion. Previous anesthesia: General Denies Family Hx of Anesthesia complications.   Denies Personal Hx of Anesthesia complications.   Social:  Smoker, No Alcohol Use    Cardiovascular:   Hypertension ECG has been reviewed.    Musculoskeletal:   Arthritis     Neurological:   Neuromuscular Disease, CTS, cervical radiculopathy, chronic neck and back pain  Chronic Pain Syndrome   Psych:   anxiety          Physical Exam  General:  Well nourished    Airway/Jaw/Neck:  Airway Findings: Mouth Opening: Normal Tongue: Normal  General Airway Assessment: Adult  Mallampati: II  TM Distance: Normal, at least 6 cm  Jaw/Neck Findings:  Neck ROM: Normal ROM  Neck  Anesthesia Pre-Procedure Evaluation    Patient: Karina MALIK Aumraan   MRN: [id number] : 1985        Preoperative Diagnosis: Morbid obesity (H) [E66.01]   Procedure : Procedure(s):  GASTRECTOMY, SLEEVE, LAPAROSCOPIC  HERNIORRHAPHY, HIATAL, LAPAROSCOPIC     No past medical history on file.   No past surgical history on file.   Allergies   Allergen Reactions     Compazine [Prochlorperazine] Difficulty breathing     Toradol [Ketorolac] Difficulty breathing     Patient had IV Toradol in ED and states she passed out.  Had difficulty breathing when she woke up.     Oxymetazoline Itching      Social History     Tobacco Use     Smoking status: Not on file   Substance Use Topics     Alcohol use: Not on file      Wt Readings from Last 1 Encounters:   No data found for Wt        Anesthesia Evaluation            ROS/MED HX  ENT/Pulmonary:     (+) asthma     Neurologic:       Cardiovascular:       METS/Exercise Tolerance:     Hematologic:       Musculoskeletal:       GI/Hepatic:       Renal/Genitourinary:       Endo:     (+) Obesity,     Psychiatric/Substance Use:     (+) psychiatric history anxiety     Infectious Disease:       Malignancy:       Other:               OUTSIDE LABS:  CBC: No results found for: WBC, HGB, HCT, PLT  BMP: No results found for: NA, POTASSIUM, CHLORIDE, CO2, BUN, CR, GLC  COAGS: No results found for: PTT, INR, FIBR  POC: No results found for: BGM, HCG, HCGS  HEPATIC: No results found for: ALBUMIN, PROTTOTAL, ALT, AST, GGT, ALKPHOS, BILITOTAL, BILIDIRECT, ESPERANZA  OTHER: No results found for: PH, LACT, A1C, MICHAEL, PHOS, MAG, LIPASE, AMYLASE, TSH, T4, T3, CRP, SED    Anesthesia Plan    ASA Status:  2      Anesthesia Type: General.     - Airway: ETT   Induction: Propofol, Intravenous.   Maintenance: Balanced.   Techniques and Equipment:     - Lines/Monitors: 2nd IV     Consents            Postoperative Care            Comments:                William Harvey MD   Findings:     Eyes/Ears/Nose:  Eyes/Ears/Nose Findings:    Dental:  Dental Findings: In tact   Chest/Lungs:  Chest/Lungs Findings: Clear to auscultation, Normal Respiratory Rate     Heart/Vascular:  Heart Findings: Rate: Normal  Rhythm: Regular Rhythm  Sounds: Normal  Heart murmur: negative Vascular Findings: Normal    Abdomen:  Abdomen Findings: Normal    Musculoskeletal:  Musculoskeletal Findings: Normal   Skin:  Skin Findings: Normal    Mental Status:  Mental Status Findings:  Cooperative, Alert and Oriented         Anesthesia Plan  Type of Anesthesia, risks & benefits discussed:  Anesthesia Type:  general  Patient's Preference:   Intra-op Monitoring Plan: standard ASA monitors  Intra-op Monitoring Plan Comments:   Post Op Pain Control Plan: multimodal analgesia and per primary service following discharge from PACU  Post Op Pain Control Plan Comments:   Induction:   IV  Beta Blocker:  Patient is not currently on a Beta-Blocker (No further documentation required).       Informed Consent: Patient understands risks and agrees with Anesthesia plan.  Questions answered. Anesthesia consent signed with patient.  ASA Score: 2     Day of Surgery Review of History & Physical:    H&P update referred to the surgeon.         Ready For Surgery From Anesthesia Perspective.

## 2021-05-10 NOTE — TELEPHONE ENCOUNTER
Reason for call:  Other   Patient called regarding (reason for call): call back  Additional comments: Patient needs a call back. She is scheduled for surgery tomorrow, and consumed a protein shake today, but thinks she was not supposed to. She wanted to leave a message for FROY Alvarez and have a message sent, so she can get a call back to discuss ASAP.     Phone number to reach patient:  Home number on file [phone] (home)    Best Time:  ASAP    Can we leave a detailed message on this number?  YES    Travel screening: Not Applicable

## 2021-05-10 NOTE — TELEPHONE ENCOUNTER
Patient states she accidentally drank a regular protein shake this morning.  Patient advised to have clear liquids the rest of the day.  Reviewed the clear liquid diet items she can have.

## 2021-05-11 ENCOUNTER — HOSPITAL ENCOUNTER (INPATIENT)
Facility: CLINIC | Age: 36
LOS: 1 days | Discharge: HOME | DRG: 621 | End: 2021-05-12
Attending: SURGERY | Admitting: SURGERY
Payer: COMMERCIAL

## 2021-05-11 ENCOUNTER — ANESTHESIA (OUTPATIENT)
Dept: SURGERY | Facility: CLINIC | Age: 36
DRG: 621 | End: 2021-05-11
Payer: COMMERCIAL

## 2021-05-11 DIAGNOSIS — E66.01 MORBID OBESITY (H): ICD-10-CM

## 2021-05-11 DIAGNOSIS — Z98.84 S/P LAPAROSCOPIC SLEEVE GASTRECTOMY: Primary | ICD-10-CM

## 2021-05-11 LAB
CREAT SERPL-MCNC: 0.57 MG/DL (ref 0.52–1.04)
GFR SERPL CREATININE-BSD FRML MDRD: >90 ML/MIN/{1.73_M2}
GLUCOSE BLDC GLUCOMTR-MCNC: 66 MG/DL (ref 70–99)

## 2021-05-11 PROCEDURE — 250N000011 HC RX IP 250 OP 636: Performed by: NURSE ANESTHETIST, CERTIFIED REGISTERED

## 2021-05-11 PROCEDURE — 258N000003 HC RX IP 258 OP 636: Performed by: NURSE ANESTHETIST, CERTIFIED REGISTERED

## 2021-05-11 PROCEDURE — 120N000002 HC R&B MED SURG/OB UMMC

## 2021-05-11 PROCEDURE — 250N000025 HC SEVOFLURANE, PER MIN: Performed by: SURGERY

## 2021-05-11 PROCEDURE — 82565 ASSAY OF CREATININE: CPT | Performed by: STUDENT IN AN ORGANIZED HEALTH CARE EDUCATION/TRAINING PROGRAM

## 2021-05-11 PROCEDURE — 88342 IMHCHEM/IMCYTCHM 1ST ANTB: CPT | Mod: TC | Performed by: SURGERY

## 2021-05-11 PROCEDURE — 710N000010 HC RECOVERY PHASE 1, LEVEL 2, PER MIN: Performed by: SURGERY

## 2021-05-11 PROCEDURE — 999N000141 HC STATISTIC PRE-PROCEDURE NURSING ASSESSMENT: Performed by: SURGERY

## 2021-05-11 PROCEDURE — 0DB64Z3 EXCISION OF STOMACH, PERCUTANEOUS ENDOSCOPIC APPROACH, VERTICAL: ICD-10-PCS | Performed by: SURGERY

## 2021-05-11 PROCEDURE — 88305 TISSUE EXAM BY PATHOLOGIST: CPT | Mod: 26 | Performed by: PATHOLOGY

## 2021-05-11 PROCEDURE — 272N000001 HC OR GENERAL SUPPLY STERILE: Performed by: SURGERY

## 2021-05-11 PROCEDURE — 36415 COLL VENOUS BLD VENIPUNCTURE: CPT | Performed by: STUDENT IN AN ORGANIZED HEALTH CARE EDUCATION/TRAINING PROGRAM

## 2021-05-11 PROCEDURE — 88305 TISSUE EXAM BY PATHOLOGIST: CPT | Mod: TC | Performed by: SURGERY

## 2021-05-11 PROCEDURE — 250N000011 HC RX IP 250 OP 636: Performed by: PHYSICIAN ASSISTANT

## 2021-05-11 PROCEDURE — 258N000003 HC RX IP 258 OP 636: Performed by: ANESTHESIOLOGY

## 2021-05-11 PROCEDURE — 250N000009 HC RX 250: Performed by: NURSE ANESTHETIST, CERTIFIED REGISTERED

## 2021-05-11 PROCEDURE — 360N000077 HC SURGERY LEVEL 4, PER MIN: Performed by: SURGERY

## 2021-05-11 PROCEDURE — 88342 IMHCHEM/IMCYTCHM 1ST ANTB: CPT | Mod: 26 | Performed by: PATHOLOGY

## 2021-05-11 PROCEDURE — 999N001017 HC STATISTIC GLUCOSE BY METER IP

## 2021-05-11 PROCEDURE — 250N000011 HC RX IP 250 OP 636: Performed by: SURGERY

## 2021-05-11 PROCEDURE — 250N000013 HC RX MED GY IP 250 OP 250 PS 637: Performed by: STUDENT IN AN ORGANIZED HEALTH CARE EDUCATION/TRAINING PROGRAM

## 2021-05-11 PROCEDURE — 258N000003 HC RX IP 258 OP 636: Performed by: STUDENT IN AN ORGANIZED HEALTH CARE EDUCATION/TRAINING PROGRAM

## 2021-05-11 PROCEDURE — 370N000017 HC ANESTHESIA TECHNICAL FEE, PER MIN: Performed by: SURGERY

## 2021-05-11 PROCEDURE — 250N000011 HC RX IP 250 OP 636: Performed by: STUDENT IN AN ORGANIZED HEALTH CARE EDUCATION/TRAINING PROGRAM

## 2021-05-11 PROCEDURE — 250N000009 HC RX 250: Performed by: STUDENT IN AN ORGANIZED HEALTH CARE EDUCATION/TRAINING PROGRAM

## 2021-05-11 RX ORDER — LIDOCAINE 40 MG/G
CREAM TOPICAL
Status: DISCONTINUED | OUTPATIENT
Start: 2021-05-11 | End: 2021-05-11 | Stop reason: HOSPADM

## 2021-05-11 RX ORDER — HYDROMORPHONE HYDROCHLORIDE 1 MG/ML
.3-.5 INJECTION, SOLUTION INTRAMUSCULAR; INTRAVENOUS; SUBCUTANEOUS EVERY 10 MIN PRN
Status: DISCONTINUED | OUTPATIENT
Start: 2021-05-11 | End: 2021-05-11

## 2021-05-11 RX ORDER — NALOXONE HYDROCHLORIDE 0.4 MG/ML
0.2 INJECTION, SOLUTION INTRAMUSCULAR; INTRAVENOUS; SUBCUTANEOUS
Status: DISCONTINUED | OUTPATIENT
Start: 2021-05-11 | End: 2021-05-12 | Stop reason: HOSPADM

## 2021-05-11 RX ORDER — ACETAMINOPHEN 325 MG/1
975 TABLET ORAL EVERY 8 HOURS
Status: DISCONTINUED | OUTPATIENT
Start: 2021-05-11 | End: 2021-05-12 | Stop reason: HOSPADM

## 2021-05-11 RX ORDER — DIPHENHYDRAMINE HYDROCHLORIDE 50 MG/ML
25 INJECTION INTRAMUSCULAR; INTRAVENOUS EVERY 6 HOURS PRN
Status: DISCONTINUED | OUTPATIENT
Start: 2021-05-11 | End: 2021-05-12 | Stop reason: HOSPADM

## 2021-05-11 RX ORDER — PROPOFOL 10 MG/ML
INJECTION, EMULSION INTRAVENOUS PRN
Status: DISCONTINUED | OUTPATIENT
Start: 2021-05-11 | End: 2021-05-11

## 2021-05-11 RX ORDER — NALOXONE HYDROCHLORIDE 0.4 MG/ML
0.4 INJECTION, SOLUTION INTRAMUSCULAR; INTRAVENOUS; SUBCUTANEOUS
Status: DISCONTINUED | OUTPATIENT
Start: 2021-05-11 | End: 2021-05-12 | Stop reason: HOSPADM

## 2021-05-11 RX ORDER — HYDROMORPHONE HCL IN WATER/PF 6 MG/30 ML
0.2 PATIENT CONTROLLED ANALGESIA SYRINGE INTRAVENOUS
Status: DISCONTINUED | OUTPATIENT
Start: 2021-05-11 | End: 2021-05-11

## 2021-05-11 RX ORDER — DEXAMETHASONE SODIUM PHOSPHATE 4 MG/ML
INJECTION, SOLUTION INTRA-ARTICULAR; INTRALESIONAL; INTRAMUSCULAR; INTRAVENOUS; SOFT TISSUE PRN
Status: DISCONTINUED | OUTPATIENT
Start: 2021-05-11 | End: 2021-05-11

## 2021-05-11 RX ORDER — ONDANSETRON 2 MG/ML
INJECTION INTRAMUSCULAR; INTRAVENOUS PRN
Status: DISCONTINUED | OUTPATIENT
Start: 2021-05-11 | End: 2021-05-11

## 2021-05-11 RX ORDER — HYOSCYAMINE SULFATE 0.125 MG
125 TABLET ORAL EVERY 4 HOURS PRN
Status: DISCONTINUED | OUTPATIENT
Start: 2021-05-11 | End: 2021-05-12 | Stop reason: HOSPADM

## 2021-05-11 RX ORDER — DIPHENHYDRAMINE HCL 25 MG
25 CAPSULE ORAL EVERY 6 HOURS PRN
Status: DISCONTINUED | OUTPATIENT
Start: 2021-05-11 | End: 2021-05-12 | Stop reason: HOSPADM

## 2021-05-11 RX ORDER — SODIUM CHLORIDE, SODIUM LACTATE, POTASSIUM CHLORIDE, CALCIUM CHLORIDE 600; 310; 30; 20 MG/100ML; MG/100ML; MG/100ML; MG/100ML
INJECTION, SOLUTION INTRAVENOUS CONTINUOUS
Status: DISCONTINUED | OUTPATIENT
Start: 2021-05-11 | End: 2021-05-11

## 2021-05-11 RX ORDER — FENTANYL CITRATE 50 UG/ML
INJECTION, SOLUTION INTRAMUSCULAR; INTRAVENOUS PRN
Status: DISCONTINUED | OUTPATIENT
Start: 2021-05-11 | End: 2021-05-11

## 2021-05-11 RX ORDER — ALBUTEROL SULFATE 0.83 MG/ML
2.5 SOLUTION RESPIRATORY (INHALATION) EVERY 4 HOURS PRN
Status: DISCONTINUED | OUTPATIENT
Start: 2021-05-11 | End: 2021-05-12 | Stop reason: HOSPADM

## 2021-05-11 RX ORDER — AMOXICILLIN 250 MG
2 CAPSULE ORAL 2 TIMES DAILY
Status: DISCONTINUED | OUTPATIENT
Start: 2021-05-11 | End: 2021-05-12 | Stop reason: HOSPADM

## 2021-05-11 RX ORDER — HYDROMORPHONE HCL IN WATER/PF 6 MG/30 ML
.2-.4 PATIENT CONTROLLED ANALGESIA SYRINGE INTRAVENOUS
Status: DISCONTINUED | OUTPATIENT
Start: 2021-05-11 | End: 2021-05-12 | Stop reason: HOSPADM

## 2021-05-11 RX ORDER — BUPIVACAINE HYDROCHLORIDE 2.5 MG/ML
INJECTION, SOLUTION EPIDURAL; INFILTRATION; INTRACAUDAL PRN
Status: DISCONTINUED | OUTPATIENT
Start: 2021-05-11 | End: 2021-05-11 | Stop reason: HOSPADM

## 2021-05-11 RX ORDER — ONDANSETRON 4 MG/1
4 TABLET, ORALLY DISINTEGRATING ORAL EVERY 6 HOURS PRN
Status: DISCONTINUED | OUTPATIENT
Start: 2021-05-11 | End: 2021-05-12 | Stop reason: HOSPADM

## 2021-05-11 RX ORDER — MEPERIDINE HYDROCHLORIDE 25 MG/ML
12.5 INJECTION INTRAMUSCULAR; INTRAVENOUS; SUBCUTANEOUS
Status: DISCONTINUED | OUTPATIENT
Start: 2021-05-11 | End: 2021-05-12

## 2021-05-11 RX ORDER — SCOLOPAMINE TRANSDERMAL SYSTEM 1 MG/1
1 PATCH, EXTENDED RELEASE TRANSDERMAL
Status: DISCONTINUED | OUTPATIENT
Start: 2021-05-11 | End: 2021-05-12 | Stop reason: HOSPADM

## 2021-05-11 RX ORDER — ONDANSETRON 2 MG/ML
4 INJECTION INTRAMUSCULAR; INTRAVENOUS EVERY 6 HOURS PRN
Status: DISCONTINUED | OUTPATIENT
Start: 2021-05-11 | End: 2021-05-12 | Stop reason: HOSPADM

## 2021-05-11 RX ORDER — SODIUM CHLORIDE, SODIUM LACTATE, POTASSIUM CHLORIDE, CALCIUM CHLORIDE 600; 310; 30; 20 MG/100ML; MG/100ML; MG/100ML; MG/100ML
INJECTION, SOLUTION INTRAVENOUS CONTINUOUS
Status: DISCONTINUED | OUTPATIENT
Start: 2021-05-11 | End: 2021-05-11 | Stop reason: HOSPADM

## 2021-05-11 RX ORDER — ONDANSETRON 2 MG/ML
4 INJECTION INTRAMUSCULAR; INTRAVENOUS EVERY 30 MIN PRN
Status: DISCONTINUED | OUTPATIENT
Start: 2021-05-11 | End: 2021-05-11

## 2021-05-11 RX ORDER — LIDOCAINE HYDROCHLORIDE 20 MG/ML
INJECTION, SOLUTION INFILTRATION; PERINEURAL PRN
Status: DISCONTINUED | OUTPATIENT
Start: 2021-05-11 | End: 2021-05-11

## 2021-05-11 RX ORDER — OXYCODONE HYDROCHLORIDE 5 MG/1
5-10 TABLET ORAL
Status: DISCONTINUED | OUTPATIENT
Start: 2021-05-11 | End: 2021-05-12 | Stop reason: HOSPADM

## 2021-05-11 RX ORDER — CEFAZOLIN SODIUM IN 0.9 % NACL 3 G/100 ML
3 INTRAVENOUS SOLUTION, PIGGYBACK (ML) INTRAVENOUS
Status: COMPLETED | OUTPATIENT
Start: 2021-05-11 | End: 2021-05-11

## 2021-05-11 RX ORDER — ONDANSETRON 4 MG/1
4 TABLET, ORALLY DISINTEGRATING ORAL EVERY 30 MIN PRN
Status: DISCONTINUED | OUTPATIENT
Start: 2021-05-11 | End: 2021-05-11

## 2021-05-11 RX ORDER — FENTANYL CITRATE 50 UG/ML
25-50 INJECTION, SOLUTION INTRAMUSCULAR; INTRAVENOUS EVERY 5 MIN PRN
Status: DISCONTINUED | OUTPATIENT
Start: 2021-05-11 | End: 2021-05-12 | Stop reason: HOSPADM

## 2021-05-11 RX ORDER — LIDOCAINE 40 MG/G
CREAM TOPICAL
Status: DISCONTINUED | OUTPATIENT
Start: 2021-05-11 | End: 2021-05-12 | Stop reason: HOSPADM

## 2021-05-11 RX ORDER — SODIUM CHLORIDE, SODIUM LACTATE, POTASSIUM CHLORIDE, CALCIUM CHLORIDE 600; 310; 30; 20 MG/100ML; MG/100ML; MG/100ML; MG/100ML
INJECTION, SOLUTION INTRAVENOUS CONTINUOUS
Status: DISCONTINUED | OUTPATIENT
Start: 2021-05-11 | End: 2021-05-12

## 2021-05-11 RX ADMIN — DOCUSATE SODIUM 50 MG AND SENNOSIDES 8.6 MG 2 TABLET: 8.6; 5 TABLET, FILM COATED ORAL at 21:55

## 2021-05-11 RX ADMIN — Medication 3 G: at 13:15

## 2021-05-11 RX ADMIN — LIDOCAINE HYDROCHLORIDE 100 MG: 20 INJECTION, SOLUTION INFILTRATION; PERINEURAL at 13:20

## 2021-05-11 RX ADMIN — SCOPALAMINE 1 PATCH: 1 PATCH, EXTENDED RELEASE TRANSDERMAL at 18:48

## 2021-05-11 RX ADMIN — ROCURONIUM BROMIDE 60 MG: 10 INJECTION INTRAVENOUS at 13:21

## 2021-05-11 RX ADMIN — PHENYLEPHRINE HYDROCHLORIDE 100 MCG: 10 INJECTION INTRAVENOUS at 13:48

## 2021-05-11 RX ADMIN — DEXAMETHASONE SODIUM PHOSPHATE 8 MG: 4 INJECTION, SOLUTION INTRA-ARTICULAR; INTRALESIONAL; INTRAMUSCULAR; INTRAVENOUS; SOFT TISSUE at 13:21

## 2021-05-11 RX ADMIN — SODIUM CHLORIDE, POTASSIUM CHLORIDE, SODIUM LACTATE AND CALCIUM CHLORIDE: 600; 310; 30; 20 INJECTION, SOLUTION INTRAVENOUS at 13:00

## 2021-05-11 RX ADMIN — FENTANYL CITRATE 25 MCG: 50 INJECTION, SOLUTION INTRAMUSCULAR; INTRAVENOUS at 15:07

## 2021-05-11 RX ADMIN — FENTANYL CITRATE 25 MCG: 50 INJECTION, SOLUTION INTRAMUSCULAR; INTRAVENOUS at 14:49

## 2021-05-11 RX ADMIN — OXYCODONE HYDROCHLORIDE 5 MG: 5 TABLET ORAL at 21:55

## 2021-05-11 RX ADMIN — PROPOFOL 180 MG: 10 INJECTION, EMULSION INTRAVENOUS at 13:20

## 2021-05-11 RX ADMIN — PHENYLEPHRINE HYDROCHLORIDE 100 MCG: 10 INJECTION INTRAVENOUS at 13:46

## 2021-05-11 RX ADMIN — ACETAMINOPHEN 975 MG: 325 TABLET, FILM COATED ORAL at 18:47

## 2021-05-11 RX ADMIN — SUGAMMADEX 400 MG: 100 INJECTION, SOLUTION INTRAVENOUS at 14:15

## 2021-05-11 RX ADMIN — ONDANSETRON 4 MG: 2 INJECTION INTRAMUSCULAR; INTRAVENOUS at 13:20

## 2021-05-11 RX ADMIN — FENTANYL CITRATE 50 MCG: 50 INJECTION, SOLUTION INTRAMUSCULAR; INTRAVENOUS at 13:10

## 2021-05-11 RX ADMIN — ENOXAPARIN SODIUM 40 MG: 100 INJECTION SUBCUTANEOUS at 11:56

## 2021-05-11 RX ADMIN — OXYCODONE HYDROCHLORIDE 5 MG: 5 TABLET ORAL at 15:40

## 2021-05-11 RX ADMIN — SODIUM CHLORIDE, POTASSIUM CHLORIDE, SODIUM LACTATE AND CALCIUM CHLORIDE: 600; 310; 30; 20 INJECTION, SOLUTION INTRAVENOUS at 14:04

## 2021-05-11 RX ADMIN — MIDAZOLAM 2 MG: 1 INJECTION INTRAMUSCULAR; INTRAVENOUS at 13:00

## 2021-05-11 RX ADMIN — HYOSCYAMINE SULFATE 125 MCG: 0.12 TABLET ORAL at 15:54

## 2021-05-11 RX ADMIN — OXYCODONE HYDROCHLORIDE 5 MG: 5 TABLET ORAL at 18:47

## 2021-05-11 RX ADMIN — FENTANYL CITRATE 50 MCG: 50 INJECTION, SOLUTION INTRAMUSCULAR; INTRAVENOUS at 15:36

## 2021-05-11 RX ADMIN — FENTANYL CITRATE 50 MCG: 50 INJECTION, SOLUTION INTRAMUSCULAR; INTRAVENOUS at 13:19

## 2021-05-11 RX ADMIN — FENTANYL CITRATE 50 MCG: 50 INJECTION, SOLUTION INTRAMUSCULAR; INTRAVENOUS at 14:11

## 2021-05-11 ASSESSMENT — ACTIVITIES OF DAILY LIVING (ADL): ADLS_ACUITY_SCORE: 16

## 2021-05-11 ASSESSMENT — MIFFLIN-ST. JEOR: SCORE: 1667.13

## 2021-05-11 NOTE — OP NOTE
[facility]    Operative Note    Pre-operative diagnosis: Morbid obesity (H) [E66.01]   Post-operative diagnosis * No post-op diagnosis entered *   Procedure: Procedure(s):  GASTRECTOMY, SLEEVE, LAPAROSCOPIC   Surgeon: Surgeon(s) and Role:     * Augusto Keenan MD - Primary     * Chadd, Eduardo Sun MD - Assisting     * Evelyn Gutierrez MD - Resident - Assisting   Assistant Surgeon      Anesthesia: Eduardo Florentino MD; [please note that Dr. Florentino was scrubbed and assisted during the operation due to the unavailability of a qualified surgical resident.]  General    Estimated blood loss: 10cc   Drains: None   Specimens: ID Type Source Tests Collected by Time Destination   A : Partial Gastrectomy Tissue Stomach, Greater Curvature SURGICAL PATHOLOGY EXAM Augusto Keenan MD 2021  1:11 PM       Findings: Normal appearing stomach. No hiatal hernia.   Complications: None.   Implants: None.         BOUGIE SIZE: 40 FR  DISTANCE FROM PYLORUS: 10 CM  STAPLE LINE REINFORCEMENT: NO  STAPLE LINE OVERSEW: NO  COMORBIDITIES: History reviewed. No pertinent past medical history.    INDICATIONS FOR PROCEDURE  Karina Rice is a 36 year old female who is morbidly obese.  Numerous weight loss attempts without surgery have been without success.     After understanding the risks and benefits of proceeding with a laparoscopic vertical sleeve gastrectomy, she agreed to an operation as outlined by me.    I reviewed the risks of surgery with Karina Rice.    These include, but are not limited to, death, myocardial infarction, pneumonia, urinary tract infection, deep venous thrombosis with or without pulmonary embolus, abdominal infection from bowel injury or abscess, bowel obstruction, wound infection, and bleeding.    More specific risks related to vertical sleeve gastrectomy were detailed at the bariatric informational seminar and include the followin.) leak at the  "vertical sleeve staple line, 2.) stricture in the sleeve, 3.) nausea, vomiting, and dehydration for several months, 4.) adhesions causing bowel obstruction, 5.) rapid weight loss causing a higher rate of gallstone formation during the first 6 months after surgery, 6.) decreased absorption of vitamins because of the reduced stomach size, 7.) weight regain if inappropriate food intake occurs.    The BMI that we are treating this patient for was measured at the initial consultation visit in our bariatric program and it was: 42.98 kg/m2 (as calculated just below).      The initial consult height, weight, and BMI are as follows:    Height: 5' 3\"  No flowsheet data found.    Our weight loss surgery program requires weight loss prior to bariatric surgery and currently the height, weight, and BMI are as follows:    Height: 160 cm (5' 3\"), Weight: 100.3 kg (221 lb 1.9 oz), and currently the Body mass index is 39.17 kg/m .    Due to the patient's elevated body mass index, bariatric surgery has been recommended and is being performed today.    Moreover, as the surgeon performing this procedure, I certify that the following are true in regards to this patient at or prior to the day of surgery:    1. The patient's body mass index (BMI) is or has been greater than or equal to 35 kg/m2.  2. The patient has at least one co-morbidity related to obesity (as outlined above).  3. The patient has been previously unsuccessful with medical treatment for obesity.  Please note that some of this information has been documented as part of a comprehensive pre-bariatric surgery process in the outpatient clinic and is NOT immediately available in the inpatient encounter for this bariatric operation.      OPERATIVE PROCEDURE:     Karina Rice was brought to the operating room and prepared in routine fashion. Under the benefits of general anesthesia, a left upper quadrant Veress needle was inserted and pneumoperitoneum was established using " carbon dioxide gas to a maximum pressure of 15 mmHg. A total of five ports were placed into the abdomen.     A liver retractor was placed through the rightmost port and this provided a view of the upper stomach. The operation was started by dividing the short gastric vessels off the greater curvature of the stomach. This dissection was carried up to the angle of His, and ligasure dissector was used for hemostasis.     A bougie (size noted above) was passed into the stomach and I used 5 blue loads of the Eth[employer] Manuel Garcia II flex linear cutter stapler device to create a vertical sleeve gastrectomy with the bougie as a template. The bougie was removed.    A transabdominal properitoneal anesthetic block was performed using 30 ml 0.5% bupivicaine diluted with 90 ml saline (120 mL total); this was injected using 22gauge spinal needle into the properitoneal planes around the ports and laterally along the anterior axillary line under direct optical guidance. Some of the mixture was used to inject local anesthetic at the skin of each incision as well.    The sleeve gastrectomy specimen (partial gastrectomy) was now removed from the abdomen through the 15 mm port.    Hemostasis was secured, and the liver retractor and all ports were removed from the abdomen under direct visualization.     Skin incisions were closed using skin staples, and sterile dressings were placed.     I was present for all critical components of the operation and all needle and sponge counts were correct x2 at the end of the procedure.    Augusto Keenan MD  Surgery  [phone] (hospital )

## 2021-05-11 NOTE — ANESTHESIA POSTPROCEDURE EVALUATION
Patient: Karina Rice    Procedure(s):  GASTRECTOMY, SLEEVE, LAPAROSCOPIC    Diagnosis:Morbid obesity (H) [E66.01]  Diagnosis Additional Information: No value filed.    Anesthesia Type:  General    Note:  Disposition: Admission   Postop Pain Control: Uneventful            Sign Out: Well controlled pain   PONV: No   Neuro/Psych: Uneventful            Sign Out: Acceptable/Baseline neuro status   Airway/Respiratory: Uneventful            Sign Out: Acceptable/Baseline resp. status   CV/Hemodynamics: Uneventful            Sign Out: Acceptable CV status; No obvious hypovolemia; No obvious fluid overload   Other NRE: NONE   DID A NON-ROUTINE EVENT OCCUR?     Event details/Postop Comments:  Awake, comfortable; satisfactory recovery from GA.    Mariann Galan MD  5/11/2021  4:03 PM           Last vitals:  Vitals:    05/11/21 1515 05/11/21 1530 05/11/21 1545   BP: (!) 143/82 (!) 149/89 (!) 143/76   Pulse: 90 82 87   Resp: 14 14 14   Temp:      SpO2: 100% 100% 97%       Last vitals prior to Anesthesia Care Transfer:  CRNA VITALS  5/11/2021 1357 - 5/11/2021 1457      5/11/2021             Pulse:  84    SpO2:  100 %          Electronically Signed By: Mariann Galan MD  May 11, 2021  4:03 PM

## 2021-05-11 NOTE — ANESTHESIA CARE TRANSFER NOTE
Patient: Karina Rice    Procedure(s):  GASTRECTOMY, SLEEVE, LAPAROSCOPIC    Diagnosis: Morbid obesity (H) [E66.01]  Diagnosis Additional Information: No value filed.    Anesthesia Type:   General     Note:    Oropharynx: oropharynx clear of all foreign objects and spontaneously breathing  Level of Consciousness: drowsy  Oxygen Supplementation: face mask  Level of Supplemental Oxygen (L/min / FiO2): 6  Independent Airway: airway patency satisfactory and stable  Dentition: dentition unchanged  Vital Signs Stable: post-procedure vital signs reviewed and stable  Report to RN Given: handoff report given  Patient transferred to: PACU    Handoff Report: Identifed the Patient, Identified the Reponsible Provider, Reviewed the pertinent medical history, Discussed the surgical course, Reviewed Intra-OP anesthesia mangement and issues during anesthesia, Set expectations for post-procedure period and Allowed opportunity for questions and acknowledgement of understanding      Vitals: (Last set prior to Anesthesia Care Transfer)  CRNA VITALS  5/11/2021 1357 - 5/11/2021 1435      5/11/2021             Pulse:  84    SpO2:  100 %        Electronically Signed By: SOLE Conti CRNA  May 11, 2021  2:35 PM

## 2021-05-11 NOTE — PLAN OF CARE
Admitted/transferred from:   2 RN full   skin assessment completed by Norma Jimenez, RN and Briana Groves, RN.  Skin assessment finding: issues found 5 lap sites covered with primapore c/d/i   Interventions/actions: other NA

## 2021-05-11 NOTE — OR NURSING
Patient sleeping quietly, occasionally she wakes up and reports pain 8-9/10, medication as well as all non pharmacological measures intiated, patient sleeps quietly after Fentanyl IV given, po Oxycodone given @ 1540 for longer lasting pain management, awaiting Hycosamine from pharmacy, patient currently sleeping. Verbal signout given by Dr. Galan for transition to floor bed.

## 2021-05-11 NOTE — ANESTHESIA PROCEDURE NOTES
Airway      Staff -        Anesthesiologist:  Nathan Cee MD       CRNA: Martina Mcbride APRN CRNA       Performed By: other anesthesia staffIndications and Patient Condition       Indications for airway management: janet-procedural       Induction type:intravenous       Mask difficulty assessment: 1 - vent by mask    Final Airway Details       Final airway type: endotracheal airway       Successful airway: ETT - single  Endotracheal Airway Details        ETT size (mm): 7.0       Cuffed: yes       Successful intubation technique: direct laryngoscopy       Grade View of Cords: 1       Adjucts: stylet       Position: Right       Measured from: lips       Secured at (cm): 22       Bite block used: None    Post intubation assessment        Placement verified by: capnometry, equal breath sounds and chest rise        Number of attempts at approach: 1       Number of other approaches attempted: 0       Secured with: pink tape       Ease of procedure: easy       Dentition: Intact and Unchanged    Medication(s) Administered   Medication Administration Time: 5/11/2021 1:24 PM    Additional Comments       Intubated by medical student with direction from MARCELLO

## 2021-05-11 NOTE — PROGRESS NOTES
SPIRITUAL HEALTH SERVICES  [facility] ([place]) [facility]B  ON-CALL VISIT     REFERRAL SOURCE: routine request was placed for  visit before pt's surgery today - no page was sent. I was not aware of request until pt was in OR.     I consulted with pt's nurse after pt went to 7B post-surgery. Nurse said that  visit on Wednesday would be appropriate.     PLAN: will inform unit  of request for  visit.     Cristofer Luna M.Div. ([place]), [facility]  Staff   Pager [phone]     * [facility] remains available 24/7 for emergent requests/referrals, either by having the switchboard page the on-call  or by entering an ASAP/STAT consult in Epic (this will also page the on-call ). Routine Epic consults receive an initial response within 24 hours.*

## 2021-05-12 ENCOUNTER — PATIENT OUTREACH (OUTPATIENT)
Dept: CARE COORDINATION | Facility: CLINIC | Age: 36
End: 2021-05-12

## 2021-05-12 ENCOUNTER — TELEPHONE (OUTPATIENT)
Dept: SURGERY | Facility: CLINIC | Age: 36
End: 2021-05-12

## 2021-05-12 VITALS
DIASTOLIC BLOOD PRESSURE: 68 MMHG | SYSTOLIC BLOOD PRESSURE: 129 MMHG | WEIGHT: 221.12 LBS | HEIGHT: 63 IN | TEMPERATURE: 97.5 F | RESPIRATION RATE: 20 BRPM | BODY MASS INDEX: 39.18 KG/M2 | HEART RATE: 75 BPM | OXYGEN SATURATION: 96 %

## 2021-05-12 LAB
GLUCOSE BLDC GLUCOMTR-MCNC: 71 MG/DL (ref 70–99)
GLUCOSE BLDC GLUCOMTR-MCNC: 78 MG/DL (ref 70–99)
GLUCOSE BLDC GLUCOMTR-MCNC: 81 MG/DL (ref 70–99)
PLATELET # BLD AUTO: 265 10E9/L (ref 150–450)

## 2021-05-12 PROCEDURE — 999N001017 HC STATISTIC GLUCOSE BY METER IP

## 2021-05-12 PROCEDURE — 85049 AUTOMATED PLATELET COUNT: CPT | Performed by: SURGERY

## 2021-05-12 PROCEDURE — 36415 COLL VENOUS BLD VENIPUNCTURE: CPT | Performed by: SURGERY

## 2021-05-12 PROCEDURE — 250N000013 HC RX MED GY IP 250 OP 250 PS 637: Performed by: STUDENT IN AN ORGANIZED HEALTH CARE EDUCATION/TRAINING PROGRAM

## 2021-05-12 RX ORDER — HYOSCYAMINE SULFATE 0.125 MG
125 TABLET ORAL EVERY 4 HOURS PRN
Qty: 30 TABLET | Refills: 1 | Status: SHIPPED | OUTPATIENT
Start: 2021-05-12 | End: 2021-06-15

## 2021-05-12 RX ORDER — AMOXICILLIN 250 MG
2 CAPSULE ORAL 2 TIMES DAILY
Qty: 30 TABLET | Refills: 0 | Status: SHIPPED | OUTPATIENT
Start: 2021-05-12 | End: 2023-04-17

## 2021-05-12 RX ORDER — ACETAMINOPHEN 325 MG/1
975 TABLET ORAL EVERY 8 HOURS
Qty: 30 TABLET | Refills: 0 | Status: SHIPPED | OUTPATIENT
Start: 2021-05-12 | End: 2021-06-15

## 2021-05-12 RX ORDER — OXYCODONE HYDROCHLORIDE 5 MG/1
5-10 TABLET ORAL
Qty: 12 TABLET | Refills: 0 | Status: SHIPPED | OUTPATIENT
Start: 2021-05-12 | End: 2021-06-15

## 2021-05-12 RX ORDER — ONDANSETRON 4 MG/1
4 TABLET, ORALLY DISINTEGRATING ORAL EVERY 6 HOURS PRN
Qty: 15 TABLET | Refills: 0 | Status: SHIPPED | OUTPATIENT
Start: 2021-05-12 | End: 2021-06-15

## 2021-05-12 RX ADMIN — ACETAMINOPHEN 325 MG: 325 TABLET, FILM COATED ORAL at 04:23

## 2021-05-12 RX ADMIN — DOCUSATE SODIUM 50 MG AND SENNOSIDES 8.6 MG 2 TABLET: 8.6; 5 TABLET, FILM COATED ORAL at 08:23

## 2021-05-12 RX ADMIN — OXYCODONE HYDROCHLORIDE 10 MG: 5 TABLET ORAL at 12:49

## 2021-05-12 RX ADMIN — HYOSCYAMINE SULFATE 125 MCG: 0.12 TABLET ORAL at 04:34

## 2021-05-12 RX ADMIN — OMEPRAZOLE 20 MG: 20 CAPSULE, DELAYED RELEASE ORAL at 08:23

## 2021-05-12 RX ADMIN — OXYCODONE HYDROCHLORIDE 10 MG: 5 TABLET ORAL at 08:23

## 2021-05-12 RX ADMIN — HYOSCYAMINE SULFATE 125 MCG: 0.12 TABLET ORAL at 09:38

## 2021-05-12 RX ADMIN — OXYCODONE HYDROCHLORIDE 5 MG: 5 TABLET ORAL at 04:22

## 2021-05-12 ASSESSMENT — ACTIVITIES OF DAILY LIVING (ADL)
ADLS_ACUITY_SCORE: 16

## 2021-05-12 NOTE — DISCHARGE SUMMARY
"[facility] Discharge Summary    Date of Admission: 5/11/2021  Date of Discharge: 5/12/2021    Admission Diagnosis:  1. Morbid Obesity    Discharge Diagnosis:  1. Same as above  2. S/p Sleeve Gastrectomy    Consultations:  Pharmacy    Procedures:  1. Vertical Sleeve Gastrectomy by Dr Ree Oviedo HPI:  Karina is a 36 year old female with a history significant for morbid obesity starting as and adult. She has had multiple unsuccessful weight loss attempts without meaningful sustainable weight loss. She consulted the bariatric surgery team at [facility] 10/2020. She has worked with their team in preparation for surgery and has met presurgical requirements. It was decided that surgery would be done at the [facility].     Hospital Course:  The patient was admitted and underwent the above procedure. The patient tolerated the procedure well. There were no complications. The patient's diet was slowly advanced as bowel function returned. Pain was controlled with oral pain medication and the patient was able to ambulate and void without difficulty. The patient received appropriate education post operatively. On POD #1 the patient was discharged to home.    Discharge Physical Exam:  /54 (BP Location: Left arm)   Pulse 71   Temp 99.6  F (37.6  C) (Oral)   Resp 20   Ht 1.6 m (5' 3\")   Wt 100.3 kg (221 lb 1.9 oz)   LMP 05/11/2021 (Exact Date)   SpO2 97%   BMI 39.17 kg/m      Gen: NAD  Lungs: non-labored breathing  CV: regular rhythm, normal rate   Abd: obese, soft, nondistended, appropriately tender, incisions are c/d/i  Ext: no peripheral edema  Neuro: AOx3    Meds:       Review of your medicines      START taking      Dose / Directions   hyoscyamine 0.125 MG tablet  Commonly known as: LEVSIN  Used for: Morbid obesity (H), S/P laparoscopic sleeve gastrectomy      Dose: 125 mcg  Take 1 tablet (125 mcg) by mouth every 4 hours as needed for cramping (spasms)  Quantity: " 30 tablet  Refills: 1     omeprazole 20 MG DR capsule  Commonly known as: priLOSEC  Used for: Morbid obesity (H), S/P laparoscopic sleeve gastrectomy      Dose: 20 mg  Take 1 capsule (20 mg) by mouth every morning (before breakfast)  Quantity: 30 capsule  Refills: 0     ondansetron 4 MG ODT tab  Commonly known as: ZOFRAN-ODT  Used for: Morbid obesity (H), S/P laparoscopic sleeve gastrectomy      Dose: 4 mg  Take 1 tablet (4 mg) by mouth every 6 hours as needed for nausea or vomiting  Quantity: 15 tablet  Refills: 0     oxyCODONE 5 MG tablet  Commonly known as: ROXICODONE  Used for: Morbid obesity (H), S/P laparoscopic sleeve gastrectomy      Dose: 5-10 mg  Take 1-2 tablets (5-10 mg) by mouth every 3 hours as needed for moderate to severe pain  Quantity: 12 tablet  Refills: 0     senna-docusate 8.6-50 MG tablet  Commonly known as: SENOKOT-S/PERICOLACE  Used for: Morbid obesity (H), S/P laparoscopic sleeve gastrectomy      Dose: 2 tablet  Take 2 tablets by mouth 2 times daily  Quantity: 30 tablet  Refills: 0        CONTINUE these medicines which may have CHANGED, or have new prescriptions. If we are uncertain of the size of tablets/capsules you have at home, strength may be listed as something that might have changed.      Dose / Directions   acetaminophen 325 MG tablet  Commonly known as: TYLENOL  This may have changed:     medication strength    how much to take    when to take this  Used for: Morbid obesity (H), S/P laparoscopic sleeve gastrectomy      Dose: 975 mg  Take 3 tablets (975 mg) by mouth every 8 hours  Quantity: 30 tablet  Refills: 0        CONTINUE these medicines which have NOT CHANGED      Dose / Directions   albuterol (2.5 MG/3ML) 0.083% neb solution  Commonly known as: PROVENTIL      NEBULIZE 1 VIAL EVERY 4 (FOUR) HOURS AS NEEDED.  Refills: 0        STOP taking    cholecalciferol 25 MCG (1000 UT) Tabs              Where to get your medicines      These medications were sent to [facility]  Discharge - [place], [place] - [address]  [address], [facility] [address]    Phone: [phone]     acetaminophen 325 MG tablet    hyoscyamine 0.125 MG tablet    omeprazole 20 MG DR capsule    ondansetron 4 MG ODT tab    oxyCODONE 5 MG tablet    senna-docusate 8.6-50 MG tablet         Additional instructions:  After Care     Future Labs/Procedures    Activity     Comments:    Your activity upon discharge: activity as tolerated and no driving for today, no lifting of more than 20lbs for 4 weeks    Diet     Comments:    Follow this diet upon discharge: Bariatric Full Liquids    Discharge Instructions     Comments:    After Bariatric Surgery Discharge Instructions  [facility]     Note: Ask your nurse to order your medications from the pharmacy. Be sure you have your medications with you when you leave.  Diet on discharge : Bariatric Full Liquids.      How much fluid should I drink?  Strive for 48-64 ounces daily.  Carry a water bottle with you without a straw or sports top. Drink from it often.  Keep track of how much fluid you drink in a day.  Remember:  -Do not use straws, chew gum or suck on hard candies. They may cause painful gas.    -Sip, don't slurp when you drink.    -Practice small sips using a medicine cup for the first week postop.   -No ice or cold drinks. This could cause gas or spasms.   -No coffee, soda pop or drinks with caffeine. These may cause stomach pain.   -No alcohol. It is bad for your liver and will cause stomach pain.    How often should I do my deep breathing and coughing?  Use your incentive spirometer (small plastic breathing device) every hour while awake after you get home. Using the incentive spirometer helps you deep breath. Continuing to cough and deep breath will help prevent fevers and pneumonia.   If you do not have a fever after one week, you can stop using the incentive spirometer and discard it.     You can continue to take  deep breaths without the incentive spirometer every one to two hours while awake for the month after surgery.    What kinds of activity can I do?  Get plenty of rest the first few days after surgery and try to balance rest and activity. You will need some time to recover - you may be more tired than you realize at first. You'll feel better and heal faster if you take good care of yourself.  For 4 weeks after surgery (Please review restrictions at your one week visit, they could change based on how well you are doing):  -Don't lift more than 20 pounds.   -Take 4-5 short walks every day.  -Don't jog, run, or do belly exercises.  Don't swim, bathe or use a hot tub until your cuts are healed (scabs are gone).  You may shower  Don't plan to fly or take a road trip within the first 1 to 3 months after surgery.  You could get a blood clot in your legs. If you must travel, get up and move around every hour for at least 5 minutes before continuing on your journey.  Your care team can help you decide when it's safe for you to travel.     What can I do for pain control?  You had major belly surgery that involves all layers of your belly muscles. Pain is expected, even for some as far out as 6-8 weeks after surgery. Moving, sneezing, coughing, and breathing will cause discomfort because these activities use your belly muscles.   Please see your after visit summary medication review for what pain medication will be continued, discontinued and newly started for you.    You can take opioid pain medicine if prescribed and if needed. Try to wean off from it as soon as you feel comfortable.   Do not drive while you are taking opioid pain medicine. This is dangerous.  You can take acetaminophen (Tylenol) between your prescribed pain medicine on a scheduled basis OR take it scheduled every 6 hours (check with your care team for specifics).  -Acetaminophen formulation options:    -Liquid   -Caplet (Cut caplet in half before taking)    -Do not take more than 3000 mg Tylenol in a 24 hour period.  -You may also take Tylenol for pain in place of the opioid pain medicine (check with your care team for specifics).   You can apply ice or heat to the affected area(s). Just remember to wrap the ice in something and limit icing sessions to 20 minutes. Excessive icing can irritate the skin or cause skin damage.  You can apply heat with a hot, wet towel or heating pad. Just like cold therapy, limit heat application to 20 minutes. Never sleep with a heating pad on. It could cause severe burns to your skin.  Wear your binder to support your belly muscles if you have one.  Take this off a little more each day and try to be off completely by 2 weeks after surgery. If you don't need your binder for comfort or support, you don't need to wear it.   You may not be able to sleep in a comfortable position for a few weeks after surgery. This is normal. You may be more comfortable sleeping in a recliner or propped up with 3 pillows for the first couple of weeks after surgery.  Do not take NSAID's (Non-Steroidal Anti-Inflammatory Drugs) (examples: ibuprofen, Motrin, Advil, Aleve, and Naproxen), aspirin, or use pain patches with NSAID's. They will increase your risk of bleeding or getting an ulcer.    Please call the clinic for any of the following pain concerns, we would like to talk to you:  -pain that does not improve with rest  -pain that gets worse and worse  -pain that is not controlled by your pain medicine  -a sudden severe increase in pain    What medications will I need to take after surgery?  You may be discharged with the following types of medications: omeprazole 20 mg once daily for heartburn symptom prevention, zofran as needed for nausea and a medication called hyoscyamine (levsin) as needed for cramping.Please see your after visit summary medication review for what will be continued, discontinued and newly started.  It is important to reduce the amount  of acid in your new stomach for a couple of months after surgery while it is still healing. We will prescribe an acid reducer or antacid. Take it as directed. This will help prevent ulcers, heartburn and acid reflux.  If you took an acid reducer before you had surgery, your care team will let you know what acid reducer you will take after surgery.   It is okay to swallow any medications smaller than   inch in size.   -If it is larger than   inch, it may need to be cut, crushed, or in a liquid form. Check with your care team about which way is most appropriate for you and your medications.    What should I know about my incisions (cuts)?  Your incisions are covered with white steri strips or butterfly tape and have band aids or gauze over the top. If you have gauze or band aids, they can come off in the hospital.  Leave your steri strips on until they fall off on their own. If the steri strips don't fall off after 1 to 2 weeks, you can take them off. If they fall off earlier, replace them with clean band aids trying to avoid touching the incision itself.   If you have gauze covered by a clear dressing, remove 2-3 days after surgery or as directed by your care team.  You may shower in the hospital after surgery and can get your incision coverings wet.  Do not submerge in water (e.g. No baths, swimming pools, hot tubs) until your care team tells you it is okay and your incisions are completely healed.    Call the clinic if you have any of these signs or symptoms of infection:  -Redness around the site.  -Drainage that smells bad.  -Drainage that is thick yellow or green.  -An increase in pain around the incision site  -An increase in swelling around the incision site  -Heat or warmth around incision site   -Fever of 101.5  F (38.3  C) or higher when taken under the tongue.    -Chills    Will my urine or bowel movements change?   Your first bowel movements (stools) will likely be liquid. You may also notice old blood or  a darker color (black or maroon color) in your bowel movements.  This is not unusual and usually goes away after the first week, if not sooner. You may not have a bowel movement for a week.   If you have not had a bowel movement for at least three days after your surgery date and are passing gas, you can use over the counter stool softeners.  Please stop taking the stool softeners and laxatives if your stools are loose.  Increasing fluids and activity as well as getting off narcotics will help prevent constipation.    Call the clinic if:   -You have stomach pain.   -You continue to have constipation.  -You have excessive bloating after walking and passing gas.    How can I prevent dehydration if I feel nauseated (sick to my stomach) and vomit (throw up)?   Vomiting is not normal after surgery. If you continue to have nausea and vomiting, call the clinic.   Nausea can be a sign of dehydration. That is why it is very important to track your fluids.  Do not nap more than one hour during the day. Set a timer to wake yourself up, if needed. Too much sleep will keep you from drinking enough fluid during the day and lead to dehydration.  No outside activity in hot, humid weather until you can drink 48 to 64 ounces of fluid in 24 hours. If you sweat a lot, your body may lose too much water.  Try to take a 1 ounce sip of water (one medicine cup) every 15 minutes.  Set a timer to remind yourself.    Call the clinic if you have any of these signs or symptoms of dehydration:  -Dark colored urine  -Urinating (pass water) less than 2-3 times per day  -Lack of energy  -Nausea  -Dizziness  -Headache    Call the clinic ANY TIME at [phone] if:  -Your pain medicine is not working.  -You have a fever = 101.5 F.  -You have belly or left shoulder pain that gets worse and worse.  -You have a swollen leg with redness, warmth, or pain behind the knee or calf.  -You have chest pain   -You feel very short of breath.  -You have a sudden  "severe increase in heart rate.  -You have vomiting that gets worse and worse.  -You have constant nausea (feeling sick to your stomach) that does not go away with medication.  -You have trouble swallowing.  -You have an increasing feeling that \"something is not right\".  -You have hiccups that do not stop.  -You have any questions or concerns.    AFTER HOURS QUESTIONS OR CONCERNS: Call [phone] and ask to speak with surgery resident if you are having troubles in the evenings, at night, or on weekends. Please call if you experience increasing abdominal pain, nausea, vomiting, increasing drainage from your wounds, chills, or fever >101.5    If you have to go to the Emergency Room, we prefer you go to the hospital that did your surgery. Please let them know that you had bariatric surgery and to notify your surgeon.    When should I go back to the clinic?  Follow up with your care team in 1-2 weeks.   If this appointment was not already made, please call: [phone]    Appointments located at [facility] clinic:  [facility] ([facility])    [address]  [place] [address]              Follow Up:  -Follow up with Annette Ram CNP 5/18/2021        BARIATRIC PATIENTS:  Call [phone] to schedule or to reach your care team        BILLIE Michaels [facility] [place]         "

## 2021-05-12 NOTE — TELEPHONE ENCOUNTER
"Fielded call. POD2 s/p LVSG. In no distress on the phone but noted fever to 101, inability to stay hydrated, chills, and ear ache. Due to the \"alarm\" symptoms, I did recommend ED eval to rule out leak/acute process. Instead, she would like to see Dr Keenan in clinic tomorrow though is aware that should anything worsen, she needs to seek attention tonite. I do not anticipate any acute pathology, but this should be ruled out.    Eduardo Florentino MD    "

## 2021-05-12 NOTE — PHARMACY-CONSULT NOTE
Bariatric Consult    Medications evaluated as requested per Bariatric Consult. Patient may swallow tablets/capsules ONLY if less than   inch.  Larger tablets/capsules should be broken, crushed or split.    No medication changes were needed based on the Bariatric Medication Management Policy.    The pharmacist will continue to follow as new medications are ordered. Please reach out to unit pharmacist with any questions.     Toribio Ibrahim, PharmD   Pager: [phone]

## 2021-05-12 NOTE — PLAN OF CARE
Vitals: Temp: 98.7  F (37.1  C) Temp src: Axillary BP: 130/66 Pulse: 97   Resp: 22 SpO2: 96 % O2 Device: Nasal cannula Oxygen Delivery: 1 LPM      Time: 0279-5069  Reason for admission: POD# 0 s/p lap sleeve  Activity:  SBA ambulated to bathroom and mills x3  Pain:  Pt reports R sided abdomen pain, period cramping, and back pain. PRN oxy x2 pt reports with relief, on scheduled tylenol.  Neuro: A&O x4, able to make needs known. Speaks english.   Cardiac: WDL, denies chest pain  Respiratory:  IS encouraged, satting mid 90s on 1L NC, denies SOB  GI/:  -Flatus, BS hypoactive. Voiding adequately post operatively.   Diet: Bariatric clears.   Lines:  L PIV SL  Incisions/Drains/Skin:  5 lap sites covered with primapore no drainage.   Lab:  0.57  Electrolyte Replacements: NA    New changes this shift:  Pain is well controlled, ambulating, excited to try more clears in AM.

## 2021-05-12 NOTE — PROGRESS NOTES
Postop check    Pt is POD0 s/p laparoscopic sleeve gastrectomy.    Pain not well controlled. Denies n/v, chest pain, SOB.    Temp: 97.7  F (36.5  C) Temp src: Oral BP: 127/60 Pulse: 75   Resp: 16 SpO2: 94 % O2 Device: Nasal cannula Oxygen Delivery: 1 LPM    Gen: NAD, resting comfortably  CV: RRR  Resp: nonlabored respirations, comfortable on 1LNC  Abd: soft, appropriately ttp, nd, incisions covered with bandages, no strikethrough  Ext: WWP w/o edema    A/P  36 year old female now s/p lap sleeve gastrectomy, doing well postoperatively. No acute concerns.    Increase dilaudid dose to 0.2-0.4    Continue management per primary team.    Mariely Narayan MD (PGY-1)  Surgery

## 2021-05-12 NOTE — PLAN OF CARE
"Vital signs:  Temp: 98.7  F (37.1  C) Temp src: Axillary BP: 128/74 Pulse: 82   Resp: 19 SpO2: 94 % O2 Device: Nasal cannula Oxygen Delivery: 1 LPM Height: 160 cm (5' 3\") Weight: 100.3 kg (221 lb 1.9 oz)  Activity: up ad viji/SBA  Neuros: WDL, A&O x4, calls appropriately.   Cardiac: WDL, VSS.  Respiratory: On 1L NC and capno. Denies SOB.  GI/: -flatus, -BM, hypoactive BS. Voiding spontaneously.   Diet:  Bariatric fulls, tolerating fairly.  Lines: L PIV infusing LR at 75 mL/hr  Labs: BG 71, sips of apple juice encouraged  Incisions/Drains: lap sites x 5 covered with primapore, no drainage.   Pain/nausea: Scheduled tylenol, PRN oxycodone x1, levsin x1 Scopolamine patch in place.   Plan: Continue POC  "

## 2021-05-12 NOTE — PROGRESS NOTES
"SPIRITUAL HEALTH SERVICES  SPIRITUAL ASSESSMENT Progress Note  [facility] ([place] 7B       REFERRAL SOURCE: Self initiated  visit, Pentecostalism specific.     DATA: Pt Karina Rice identifies as Pentecostalism and is of [place] descent.     Karina is a single mother of three children and lives in [place], [place]. She migrated her from [place] about 14 years ago.     She expressed deep fears and worry over, \"I used to be fully covered (hijab) but now I have removed it for the past year\". Her reasons for this is that she has experienced racial/religous bigotry from her city neighbors and people. She has a fear of being killed just for being a Pentecostalism.     Her children have also been bullied by school staff and students and she says that, \"[employer] is portrayed at the school like its terrorism\".  I provided some antidotal experiences to Karina growing up here as the only Pentecostalism in my school and during the first [place] War. We also processed this together with empathic listening and reflective conversation.      I offered Islamic incantations/prayer at bedside We prayed together at her request.    Karina has requested an [place]o copy of the Holy Quran and has received it. I also provided Karina with an Islamic prayer booklet and card with a Prophetic supplication.  Upon knowing the availability of hijabs through [facility], Karina also requested and received one in the hopes that, \"By this next year, I will move to [place] and wear it again\".      PLAN: I will follow up with Karina Rice for the duration of his stay.     Ladi Argueta  Lead Pentecostalism   Pager [phone]    [facility] remains available 24/7 for emergent requests/referrals, either by having the switchboard page the on-call  or by entering an ASAP/STAT consult in Epic (this will also page the on-call ).    "

## 2021-05-12 NOTE — PHARMACY-CONSULT NOTE
Pharmacy Tube Feeding Consult    Medication reviewed for administration by feeding tube and for potential food/drug interactions.    Recommendation: No changes are needed at this time.     Pharmacy will continue to follow as new medications are ordered.    Joan Culp, SaúlD

## 2021-05-13 ENCOUNTER — PATIENT OUTREACH (OUTPATIENT)
Dept: ENDOCRINOLOGY | Facility: CLINIC | Age: 36
End: 2021-05-13

## 2021-05-13 NOTE — PROGRESS NOTES
RN Post-Op/Post-Discharge Care Coordination Note    Ms. Karina Rice is a 36 year old female who underwent laparoscopic gastric sleeve on 5/11/21 with  Dr. Rafa Keenan.  Spoke with Patient.    Support  Patient able to care for self independently     Health Status  Fevers/chills: Patient denies any fever or chills.    Nausea/Vomiting: Patient reports feeling nauseated.    Eating/drinking: Tolerating full liquid diet. Reminded to drink small sips slowly.  Drank broth yesterday and did well.    Fluid Ounces per day: 36 ounces    Bowel habits: Patient reports no bowel movement since surgery. Is passing large amounts of gas.  Activity:walking    Breathing: Reminded patient to use incentive spirometer- 5 to 10 deep breaths each hour while awake.    Other symptoms: Tachycardia: yes but much better than yesterday. Dizziness/lightheadedness: no, Shortness of breath, dyspnea with exertion, leg pain/swelling: no.      Ear pain:  Patient c/o left ear pain.  This is also the location of the scopolamine patch.  Patient instructed to remove patch.  Per Dr. Keenan, patient should be seen in UC to evaluate for ear infection.  Patient can also see PCP today if available. Patient will call with update this afternoon after she is seen.    Drains (LITA): N/A    Incisions: Patient denies any signs and symptoms of infection..  Wound closure:  Steri-strips    Pain: Rates pain a 3 on a 10 Scale.  IAlternating Narcotic Analgesic with Extra Strength Tylenol.  Encouraged non-medication management modalities: Heating pad, with barrier, to abdomen    New Medications:  Omeprazole (opening capsules), Levsin, Zofran, Oxycodone, Tylenol, Senna, home medications and patient was reminded not to take NSAIDS    Activity/Restrictions  No lifting in excess of 20 pounds for 4 weeks    Pathology reviewed with patient:  No, not yet available    Forms/Letters  No. All forms should be faxed to [phone].  Patient is planning on being off work for 8  weeks    Additional Questions: All of her questions were answered including reviewing diet, restrictions, and wound care.  She will call this office if she has any further questions and/or concerns.      Post op appointment on May 18 at 1:00 PM confirmed with the patient:  Yes.    Whom and When to Call  Nurses: [phone]  Fax: [phone]     Patient advised if any concerns outside of clinic hours, to call hospital at [phone] and ask to speak to on-call bariatric resident. They should present to ED at [facility] to be assessed if urgency arises.      Risk for:  urinary tract infection, pneumonia, leg clots (deep vein thrombosis), lung clots (pulmonary emboli), injury to the bowels or other organs, bowel obstruction, hernia, and gastrointestinal bleeding.      Weight loss surgery side effects:  abdominal pain, cramping, bloating, difficulty swallowing, constipation, nausea, vomiting, diarrhea, frequent loose stools, dehydration, hair loss, excess skin, protein, iron and vitamin deficiencies, malnutrition, fatigue, and heartburn.    Bariatric surgery risks may include:  an internal leak at the staple line, narrowing of the esophagus, stomach or intestines (strictures), gallstones, bowel obstruction, inflammation of the esophagus or stomach, ulcers with or without bleeding, injuries to other organs, hernias, and iron deficiency.    Sleeve gastrectomy side effects:  leaks are more common after this procedure.  Risks include:  internal leak at the vertical sleeve staple line; nausea, vomiting, and dehydration for several months; bowel obstruction; gallstones during the first 6 months related to rapid weight loss; decreased absorption of vitamins and protein because of the reduced stomach size; weight regain if you increase food intake; narrowing of stomach, esophagus or intestines (stricture); injury to other organs; hernia; and ulcers.

## 2021-05-13 NOTE — PROGRESS NOTES
[facility]: Post-Discharge Note  SITUATION                                                      Admission:    Admission Date: 05/11/21   Reason for Admission: Morbid obesity  Discharge:   Discharge Date: 05/12/21  Discharge Diagnosis: Morbid obesity    BACKGROUND                                                      Karina is a 36 year old female with a history significant for morbid obesity starting as and adult. She has had multiple unsuccessful weight loss attempts without meaningful sustainable weight loss. She consulted the bariatric surgery team at [facility] 10/2020. She has worked with their team in preparation for surgery and has met presurgical requirements. It was decided that surgery would be done at the [place].        ASSESSMENT      Discharge Assessment  Patient reports symptoms are: Improved  Does the patient have all of their medications?: Yes  Does patient know what their new medications are for?: Yes  Does patient have a follow-up appointment scheduled?: Yes  Does patient have any other questions or concerns?: No    Post-op  Did the patient have surgery or a procedure: Yes  Fever: No  Chills: No  Eating & Drinking: eating and drinking without complaints/concerns  Bowel Function: normal  Urinary Status: voiding without complaint/concerns        PLAN                                                      Outpatient Plan:  Follow up with Annette Ram CNP 5/18/2021     Future Appointments   Date Time Provider Department Center   5/18/2021  1:00 PM [facility]           Kaitlin Urr[place]

## 2021-05-15 LAB — COPATH REPORT: NORMAL

## 2021-05-18 ENCOUNTER — OFFICE VISIT (OUTPATIENT)
Dept: ENDOCRINOLOGY | Facility: CLINIC | Age: 36
End: 2021-05-18
Payer: COMMERCIAL

## 2021-05-18 VITALS
DIASTOLIC BLOOD PRESSURE: 82 MMHG | BODY MASS INDEX: 37.25 KG/M2 | SYSTOLIC BLOOD PRESSURE: 123 MMHG | OXYGEN SATURATION: 96 % | HEIGHT: 63 IN | WEIGHT: 210.2 LBS | HEART RATE: 109 BPM

## 2021-05-18 DIAGNOSIS — K59.00 CONSTIPATION, UNSPECIFIED CONSTIPATION TYPE: ICD-10-CM

## 2021-05-18 DIAGNOSIS — Z98.84 S/P LAPAROSCOPIC SLEEVE GASTRECTOMY: ICD-10-CM

## 2021-05-18 DIAGNOSIS — Z98.84 S/P LAPAROSCOPIC SLEEVE GASTRECTOMY: Primary | ICD-10-CM

## 2021-05-18 LAB
ALBUMIN SERPL-MCNC: 4 G/DL (ref 3.4–5)
ALP SERPL-CCNC: 87 U/L (ref 40–150)
ALT SERPL W P-5'-P-CCNC: 90 U/L (ref 0–50)
ANION GAP SERPL CALCULATED.3IONS-SCNC: 9 MMOL/L (ref 3–14)
AST SERPL W P-5'-P-CCNC: 45 U/L (ref 0–45)
BILIRUB SERPL-MCNC: 0.8 MG/DL (ref 0.2–1.3)
BUN SERPL-MCNC: 17 MG/DL (ref 7–30)
CALCIUM SERPL-MCNC: 9.7 MG/DL (ref 8.5–10.1)
CHLORIDE SERPL-SCNC: 102 MMOL/L (ref 94–109)
CO2 SERPL-SCNC: 26 MMOL/L (ref 20–32)
CREAT SERPL-MCNC: 0.52 MG/DL (ref 0.52–1.04)
ERYTHROCYTE [DISTWIDTH] IN BLOOD BY AUTOMATED COUNT: 12.1 % (ref 10–15)
GFR SERPL CREATININE-BSD FRML MDRD: >90 ML/MIN/{1.73_M2}
GLUCOSE SERPL-MCNC: 81 MG/DL (ref 70–99)
HCT VFR BLD AUTO: 40.1 % (ref 35–47)
HGB BLD-MCNC: 13.2 G/DL (ref 11.7–15.7)
MCH RBC QN AUTO: 25.8 PG (ref 26.5–33)
MCHC RBC AUTO-ENTMCNC: 32.9 G/DL (ref 31.5–36.5)
MCV RBC AUTO: 79 FL (ref 78–100)
PLATELET # BLD AUTO: 316 10E9/L (ref 150–450)
POTASSIUM SERPL-SCNC: 3.3 MMOL/L (ref 3.4–5.3)
PROT SERPL-MCNC: 8.2 G/DL (ref 6.8–8.8)
RBC # BLD AUTO: 5.11 10E12/L (ref 3.8–5.2)
SODIUM SERPL-SCNC: 137 MMOL/L (ref 133–144)
WBC # BLD AUTO: 6.9 10E9/L (ref 4–11)

## 2021-05-18 PROCEDURE — 99024 POSTOP FOLLOW-UP VISIT: CPT | Performed by: NURSE PRACTITIONER

## 2021-05-18 PROCEDURE — 36415 COLL VENOUS BLD VENIPUNCTURE: CPT | Performed by: PATHOLOGY

## 2021-05-18 PROCEDURE — 80053 COMPREHEN METABOLIC PANEL: CPT | Performed by: PATHOLOGY

## 2021-05-18 PROCEDURE — 85027 COMPLETE CBC AUTOMATED: CPT | Performed by: PATHOLOGY

## 2021-05-18 RX ORDER — POLYETHYLENE GLYCOL 3350 17 G/17G
1 POWDER, FOR SOLUTION ORAL DAILY
Qty: 527 G | Refills: 0 | Status: SHIPPED | OUTPATIENT
Start: 2021-05-18 | End: 2021-06-15

## 2021-05-18 ASSESSMENT — MIFFLIN-ST. JEOR: SCORE: 1617.59

## 2021-05-18 ASSESSMENT — PAIN SCALES - GENERAL: PAINLEVEL: NO PAIN (0)

## 2021-05-18 NOTE — LETTER
"5/18/2021       RE: Karina Rice  [address]  [place] [place] [facility] [address]     Dear Colleague,    Thank you for referring your patient, Karina Rice, to the [facility] [place] [facility]. Please see a copy of my visit note below.    Postoperative bariatric surgery visit.    Patient underwent sleeve gastrectomy 1 week ago.  5/11/2021 Dr. Ree Florentino spoke to 5/12- tachycardia, febrile, dehydration   Kim 5/13- feeling better, no tachycardia, tolerating fluids    Tolerating liquids:  Yes   Lightheadedness: occasional   Abdominal pain: LUQ pain starting 2 days ago, starting after walking and lifting very light options, mild tenderness, pain persists - worse with walking    Bowel movements: -constipation, no BM in 5 days, taking senna 2 tabs once daily   Fevers/shakes/chills: not since 5/12, sometimes hot at night   GERD: none Taking omeprazole once daily   Leg/calf pain: none      How many opioid pain medications used after surgery?  12 What did you do with extra pills? Na   Were any opioid pain medication refills provided after surgery?  Were any opioid pain medications needed after 30 days postop?    /82 (BP Location: Left arm, Patient Position: Chair, Cuff Size: Adult Large)   Pulse 109   Ht 1.6 m (5' 3\")   Wt 95.3 kg (210 lb 3.2 oz)   LMP 05/11/2021 (Exact Date)   SpO2 96%   BMI 37.24 kg/m       Resting pulse later- manual radial 90   NAD  Overall looks dry  Incisions c/d/i; LUQ tender    INTERPRETATION:   Partial gastrectomy:  Immunostain, with appropriate controls, on block A1   is POSITIVE for H. pylori,   confirming this as likely etiology for gastritis     Plan:  1. RD visit today.  2. Start vitamin supplements per RD directions.  3. Advance diet per RD directions.  4. Follow-up: 6/15/2021  5. Actigall prescription -discuss at follow up   6. B12 SL or injection **  7. Pathology reviewed today- " "positive H. Pylori, will treat at next visit   8. Weight loss medications none       Concern today for dehydration. Patient feels thirsty but had some misunderstanding about fluid intake. No fevers since 5/12 but has new and persistent LUQ pain. CBC and CMP within normal limits. This decreases suspicion for a leak. No further testing warranted at this time. Had considered UGI to rule out leak if patient showed leukocytosis.     For constipation, recommend continuing senna and starting miralax.     Stressed fluids. Continue omeprazole. See RD tomorrow.     After visit, did discuss H.Pylori. She has been treated \"several times\" in the past without resolution. Should consider stool study 3 mo post op to evaluate for infection. If infection persists, consider follow up with GI for further treatment recommendations.     Annette Ram, BILLIE  [facility] [place]     "

## 2021-05-18 NOTE — NURSING NOTE
"Chief Complaint   Patient presents with     Follow Up     1 Week Follow-up Laparscopic Gastric Sleeve Surgery       Vitals:    05/18/21 1250   BP: 123/82   BP Location: Left arm   Patient Position: Chair   Cuff Size: Adult Large   Pulse: 109   SpO2: 96%   Weight: 95.3 kg (210 lb 3.2 oz)   Height: 1.6 m (5' 3\")       Body mass index is 37.24 kg/m .                            "

## 2021-05-18 NOTE — PROGRESS NOTES
"Postoperative bariatric surgery visit.    Patient underwent sleeve gastrectomy 1 week ago.  5/11/2021 Dr. Ree Florentino spoke to 5/12- tachycardia, febrile, dehydration   Kim 5/13- feeling better, no tachycardia, tolerating fluids    Tolerating liquids:  Yes   Lightheadedness: occasional   Abdominal pain: LUQ pain starting 2 days ago, starting after walking and lifting very light options, mild tenderness, pain persists - worse with walking    Bowel movements: -constipation, no BM in 5 days, taking senna 2 tabs once daily   Fevers/shakes/chills: not since 5/12, sometimes hot at night   GERD: none Taking omeprazole once daily   Leg/calf pain: none      How many opioid pain medications used after surgery?  12 What did you do with extra pills? Na   Were any opioid pain medication refills provided after surgery?  Were any opioid pain medications needed after 30 days postop?    /82 (BP Location: Left arm, Patient Position: Chair, Cuff Size: Adult Large)   Pulse 109   Ht 1.6 m (5' 3\")   Wt 95.3 kg (210 lb 3.2 oz)   LMP 05/11/2021 (Exact Date)   SpO2 96%   BMI 37.24 kg/m       Resting pulse later- manual radial 90   NAD  Overall looks dry  Incisions c/d/i; LUQ tender    INTERPRETATION:   Partial gastrectomy:  Immunostain, with appropriate controls, on block A1   is POSITIVE for H. pylori,   confirming this as likely etiology for gastritis     Plan:  1. RD visit today.  2. Start vitamin supplements per RD directions.  3. Advance diet per RD directions.  4. Follow-up: 6/15/2021  5. Actigall prescription -discuss at follow up   6. B12 SL or injection **  7. Pathology reviewed today- positive H. Pylori, will treat at next visit   8. Weight loss medications none       Concern today for dehydration. Patient feels thirsty but had some misunderstanding about fluid intake. No fevers since 5/12 but has new and persistent LUQ pain. CBC and CMP within normal limits. This decreases suspicion for a leak. No further " "testing warranted at this time. Had considered UGI to rule out leak if patient showed leukocytosis.     For constipation, recommend continuing senna and starting miralax.     Stressed fluids. Continue omeprazole. See RD tomorrow.     After visit, did discuss H.Pylori. She has been treated \"several times\" in the past without resolution. Should consider stool study 3 mo post op to evaluate for infection. If infection persists, consider follow up with GI for further treatment recommendations.     Annette Ram, CNP  [facility] [place]   "

## 2021-05-18 NOTE — PATIENT INSTRUCTIONS
"It was a pleasure meeting with you today.    Thank you for allowing us the privilege of caring for you. We hope we provided you with the excellent service you deserve.   Please let us know if there is anything else we can do for you so that we can be sure you are leaving completely satisfied with your care experience.    To ensure the quality of our services you may be receiving a patient satisfaction survey from an independent patient satisfaction monitoring company.    The greatest compliment you can give is a \"Likely to Recommend\"      Your visit was with Annette Ram NP today.     Instructions per today's visit:     -continue senna once daily   -add Miralax - once daily - in water  -increase water intake to 4 bottles daily (64oz)- add electrolyte based drink like gatorade zero or powerade zero  -Increase protein shake to at least 1 daily   -labs today- I'll call if we need swallow study       To schedule appointments with our team, please call [phone] option #1    Please call during clinic hours Monday through Friday 8:00a - 4:00p if you have questions or you can contact us via [employer] at anytime.      Nurses: [phone]  Fax: [phone]  Surgery Scheduler: [phone]    Please call the hospital at [phone] to speak with our on call MDs if you have urgent needs after hours, during weekends, or holidays.    **Please note if you need to go to the Emergency Room for any reason in the first 90 days after surgery it is important to go to the [facility] on the [place] on [address] off of the [address] off [address]    *For patients who are currently enrolled in our program and have not yet had weight loss surgery please note*:    You must be at your required goal weight at the time of your Anesthesia clinic visit as well as the day of surgery or your surgery will be canceled. You will not be able to obtain a new surgery date until you have met your goal weight.    Lab results will be " communicated through My Chart or letter (if My Chart not used). Please call the clinic if you have not received communication after 1 week or if you have any questions.?    Main follow-up parameters for all bariatric patients     Patients who have undergone Bariatric surgery:    1. Follow-up interval during the first year: ~1 week, 1 month, 3 month, 6 month,12 months.  Every 6 months until 5 years; and then annually thereafter.  The goal of follow-up sessions is to ensure that food intake behavior (choice of food and eating speed) and exercise/activity level are set appropriately.    2. Yearly lab tests ordered by our clinic.      3. The bariatric team should be aware and potentially evaluate all adverse gastrointestinal symptoms (dysphagia, abdominal pain, nausea, vomiting, diarrhea, heartburn, reflux, etc), which can be a sign of complication.  The bariatric surgeons perform general surgery procedures in addition to bariatric surgery (laparoscopic cholecystectomy, etc.)    4. Inability to tolerate textured food (chicken, steak, fish, eggs, beans) is NOT normal and may need to be evaluated by endoscopy performed by the bariatric surgeon.    _____________________________________________________________________________________________________________________________    Meal Replacement Products:    Here is the link to our new e-store where you can purchase our meal replacement products    [facility] E-Store  [email or web address]    The one week starter kit is a great way to sample a variety of products and see what works for you.    If you want more information about the product go to: Fresh Loggly    Free Shipping for orders over $75     Benefits of meal replacements products:    Portion and calorie control  Improved nutrition  Structured eating  Simplified food choices  Avoid contact with trigger  foods  ______________________________________________________________________________________________________________________________    Healthy Lifestyle Support Group   [facility] Weight Management Program  Virtual Support Group Now Available    60 minutes of small group guided discussion, support and resources    Led by Health , Zuleyma Mayberry    For questions, and to receive the invite information, contact Zuleyma at [email or web address]    All our welcome!    One Friday per month, 12:30pm to 1:30pm  SELF COMPASSION: July 31st  CREATING MY WELLNESS VISION: August 28th  MINDFULNESS PRACTICES FOR A CALM BODY & MIND: September 25th  MINDFUL EATING TOOLS: October 30th  HEALTHY& HAPPY HOLIDAYS: November 20th  OPEN FORUM: December 18th  _______________________________________________________________________________________________________________________________    Connections: Bariatric Care support group  Due to the Covid-19 restrictions, we will be doing our support group virtually using Microsoft Teams. You will need to get an invitation to the group from Geovanni Doran, Ph.D., LP at [email or web address] and to learn about using Microsoft Teams. The next meeting is on Tuesday, July 14 between 6:30 and 8 PM and there will be no formal speaker.    This group meets the second Tuesday of each month from 6:30 to 8 PM and will be held again at [facility] in the  [facility] [place] (A-B room) when the Covid-19 restrictions are lifted. The group is led by Geovanni Doran, Ph.D., Licensed Psychologist, [facility] Comprehensive Weight Management Program. There is no cost for group participation and is open to all [facility] (and those external to this program) pre- and post-operative bariatric surgery patients as well as their support system.    _________________________________________________________________________________________________________________________________      Interested in working with a health ?  Health coaches work with you to improve your overall health and wellbeing.  They look at the whole person, and may involve discussion of different areas of life, including, but not limited to the four pillars of health (sleep, exercise, nutrition, and stress management). Discuss with your care team if you would like to start working a health .     Health Coaching-3 Pack:      $99 for three health coaching visits    Visits may be done in person or via phone    Coaching is a partnership between the  and the client; Coaches do not prescribe or diagnose    Coaching helps inspire the client to reach his/her personal goals   __________________________________________________________________________________________________________________________________    [place] [facility] Get Moving Program    Our team of physical therapists is trained to help you understand and take control of your condition. They will perform a thorough evaluation to determine your ability for activity and develop a customized plan to fit your goals and physical ability.  Scheduling: Unsure if the Get Moving program is right for you? Discuss the program with your medical provider or diabetes educator. You can also call us at [phone] to ask questions or schedule an appointment.   [facility] Get Moving Program  ______________________________________________________________________________________________________________________  Bluetooth Scale:    We hope to provide you with high quality telephone and virtual healthcare visits while social distancing for COVID-19 is necessary, as well as in the future when virtual visits may be more convenient for you.     Our technology team made it possible for Accertify scales to send weight measurements to our  electronic medical record. This allows weights from you weighing at home to securely flow into the medical record, which will improve telephone and virtual visits.   Additionally, studies have shown that adults actually lose more weight when their weights are automatically sent to someone else, and also that this process is not stressful for those adults.    Below is a link for purchasing the scale, with a discount code for our patients. You may call your insurance company to see if they will reimburse you for the cost of the scale, as a piece of durable medical equipment. The scales only go up to a weight of 400 pounds. This is an issue and we are working with the developer on increasing this. We found no scales that go over 400lb that have blue-tooth for connecting to [employer].    Scale to purchase: the Kanshu  Body  Scale: [email or web address]=aw.ds    Discount Code: We have a discount code for our patients to bring the cost down to $50, the code is: [place]_Scale_20%off    Steps to link the scale to [employer] via an Android Phone (you can always disconnect at any point in the future):  1. The order must be placed first before the patient can access Track My Health within [employer].  2. Download Google Fit rachel from the Google Play Store   a. Log in or register using your Google account   3. Download the [employer] rachel from Google Play Store  a. Select add organization   b. Search for [employer] and select it   c. Log into [employer]  d. Select Track My Health   e. Select the green connect my account button   f. When prompted log into your Google account   g. Select okay to confirm the account   4. Download the [employer] Health Mate rachel from Google Play Store  a. [place] for Kanshu   b. Go to profile   c. Tap google fit under the Apps section  d. Select the option to activate Google Fit integration   e. Select the same Google  account   f. Select okay to confirm the account  g.   Steps to link the scale to [employer] via an iPhone (you can always disconnect at any point in the future):  **Note Attenex is not available for download on an iPad**  1. The order must be placed first before the patient can access Track My Health within [employer].  2. Locate the Health mando on your iPhone.  a. Set up your Apple Health account as prompted  b. The Sources page will show Apps that communicate with your Health mando. Once all steps are completed, you should see [address] and Fusion-[employer] listed under the Apps section and your iPhone under the devices section.  i. Select Health Mate  1. Under 'ALLOW  [employer]  TO WRITE DATA ensure the toggle is on for Weight.  2. This will allow the scale to add your weight to the Apple Health  ii. Select MyChart  1. Under 'ALLOW  [employer]  TO READ DATA ensure the toggle is on for Weight.  2. This allows [employer] to grab the weight from Attenex so your provider can see your weights.  3. Download the [employer] mando from the Madno Store   a. Select add organization                                                  b. Search for [employer] and select it  c. Log into [employer]  d. Select Track My Health   e. Select the green connect my account button   f. Follow prompts to link your device to [employer].  4. Download the [employer] health mate mando in the Mando Store   a. [place] for [employer]   b. Go to profile   c. [employer] under the Apps section  d. If prompted to allow access with the Health Mando, toggle weight on for read and write access.

## 2021-05-18 NOTE — ASSESSMENT & PLAN NOTE
1 week post op. Sudden onset LUQ pain starting 2 days ago while walking (was walking for 1 hour). No change since then. Improves at rest. Worse with walking or lifting light items. Abdomen is minimally tender at LUQ and epigastric region. Occasional nausea and lightheadedness, somewhat anxious. Inadequate fluid intake in the last week, feels thirsty but thought she had fluid restrictions to follow. Stressed increasing fluids today, no restriction unless she isn't tolerating the fluids. Labs today- CBC an CMP. If leukocytosis, will get UGI to rule out leak. Patient eager to increase fluids and feels comfortable that she can get enough.     Continue PPI   +H.pylori, did not discuss given other sx today. Will address at next visit. Will also need to address actigall at that time too.

## 2021-05-19 ENCOUNTER — VIRTUAL VISIT (OUTPATIENT)
Dept: GASTROENTEROLOGY | Facility: CLINIC | Age: 36
End: 2021-05-19
Payer: COMMERCIAL

## 2021-05-19 DIAGNOSIS — Z98.84 S/P LAPAROSCOPIC SLEEVE GASTRECTOMY: ICD-10-CM

## 2021-05-19 DIAGNOSIS — E66.01 MORBID OBESITY (H): Primary | ICD-10-CM

## 2021-05-19 PROCEDURE — 97803 MED NUTRITION INDIV SUBSEQ: CPT | Mod: GT | Performed by: DIETITIAN, REGISTERED

## 2021-05-19 NOTE — PROGRESS NOTES
"Karina Rice is a 36 year old female who is being evaluated via a billable telephone visit.     The patient has been notified of following:     \"This telephone visit will be conducted via a call between you and your physician/provider. We have found that certain health care needs can be provided without the need for a physical exam.  This service lets us provide the care you need with a short phone conversation.  If a prescription is necessary we can send it directly to your pharmacy.  If lab work is needed we can place an order for that and you can then stop by our lab to have the test done at a later time.    Telephone visits are billed at different rates depending on your insurance coverage. During this emergency period, for some insurers they may be billed the same as an in-person visit.  Please reach out to your insurance provider with any questions.    If during the course of the call the physician/provider feels a telephone visit is not appropriate, you will not be charged for this service.\"    Patient has given verbal consent for Telephone visit?  Yes    What phone number would you like to be contacted at? [phone]    How would you like to obtain your AVS? My chart    Phone call duration: 37 minutes    During this virtual visit the patient is located in [place], patient verifies this as the location during the entirety of this visit.     Nutrition Assessment  Reason For Visit:  Karina Rice is a 36 year old female presenting today for nutrition follow-up, 1 week s/p VSG. Had surgery with Dr. Augusto Keenan on 5/11/2021. Had telephone visit with Arabic  today. Pt's understanding and spoken English are very good, so  remained online with writer and patient, but pt responded directly to writer's questions and asked her own questions to writer without assistance. She was also able to repeat back recommendations for post op diet progression.    Anthropometrics  Initial Consult " Weight: 235 lbs  Day of Surgery Weight: 221 lbs  Current Weight: 209 lbs at home today. In clinic yesterday at 1 week post op visit with CNP was 210 lbs  Weight loss: -26 lbs from initial consult; -12 lbs from day of surgery    Current Vitamins/Minerals: n/a currently    Nutrition History  Pt reports consuming and tolerating bariatric clear and low-fat full liquid diets. Fluid intake appears adequate, consuming 48-64 oz/day.  B: protein shake (30g pro, 160 anthony, 1g sugar)  L: veg broth soup  D: sugar free LF greek yogurt  Snack: SF popsicle or sometimes a second protein drink   Lesly: 48-64 oz/day, sometimes 2-3 T Fat free milk.    Exercise: walking daily    Nutrition Prescription:  Grams Protein: 60 (minimum)  Amount of Fluid: 48-64 oz    Nutrition Diagnosis  Food and nutrition-related knowledge deficit r/t lack of prior exposure to diet advancements beyond bariatric low-fat full liquid diet aeb pt unable to verbalize understanding of bariatric pureed and soft diets.    Intervention  Intervention At Appointment:  Materials/education provided on bariatric pureed and soft diets, protein intake, fluid intake, eating pace, portion control, avoiding excess sugar and fat, recommended vitamin/mineral supplements.    Patient Understanding: good  Expected Compliance: good    Goals:  1) Follow bariatric low-fat full liquid diet until 5/24.   From May 25-June 6, follow a pureed diet (see stage 3 diet handout).  From June 7-July 5, follow a soft diet (see stage 4 diet handout)  Starting July 6, can transition to a regular post vertical sleeve gastrectomy diet (see stage 5 handout)  2) Work towards 60 gm protein/day.  3) Consume 48-64 oz fluids daily- between meals.  4) Eat slowly (>20 min/meal), chewing well to smooth consistency once on the bariatric soft diet.  5) Limit portions to 1/4-1/2 cup/meal or less. (per your tolerance, stop when you are satisfied.)  6) Start chewable/liquid multivitamin/minerals twice daily (okay to  take both multivitamins at the same time if you prefer).  Here is the name of a chewable one that does not contain gelatin/pork.    Bariatric Fusion - Complete Chewable Multivitamin with Iron   - 2 chews per day provides 22 mg iron [email or web address]-[email or web address]/products/mixed-berry-[email or web address]-chewable-multivitamin    Follow-Up: scheduled on 6/15 at 11:30 AM    Time spent with patient: 37 minutes.  Gabriella Luna MS, RD, LD

## 2021-05-19 NOTE — PATIENT INSTRUCTIONS
It was nice speaking with you today. Below are the nutrition recommendations we discussed at your visit. I got a message from our Nurse, Kim that the name of the shampoo is called Nioxin.    At the end of this message, are links to all of the handouts for the diet progression and consistencies to follow with ideas and example foods.    Please let me know if you have any additional questions.    Goals:    1) Follow bariatric low-fat full liquid diet until 5/24.     From May 25-June 6, follow a pureed diet (see stage 3 diet handout).    From June 7-July 5, follow a soft diet (see stage 4 diet handout).    Starting July 6, can transition to a regular post vertical sleeve gastrectomy diet (see stage 5 handout).    2) Work towards 60 gm protein/day.    3) Consume 48-64 oz fluids daily- between meals.    4) Eat slowly (>20 min/meal), chewing well to smooth consistency once on the bariatric soft diet.    5) Limit portions to 1/4-1/2 cup/meal or less. (per your tolerance, stop when you are satisfied).    6) Start chewable/liquid multivitamin/minerals twice daily (okay to take both multivitamins at the same time if you prefer).  Here is the name of a chewable one that does not contain gelatin/pork.    Bariatric Fusion - Complete Chewable Multivitamin with Iron   - 2 chews per day provides 22 mg iron [email or web address]/[email or web address]-chewable/products/mixed-berry-complete-chewable-multivitamin    Your follow up 1 month post surgery appointment with me is scheduled for Michelle 15, 2021 at 11:30 AM. If you need to make any changes to your appointment, please call [phone].    Gabriella Luna, MS, RD, LD    Weight Loss Surgery Post-Op Diet Progression    Diet Guidelines after Weight-loss Surgery  [email or web address]     Checklist for Stages of Your Diet after Weight-loss Surgery  [email or web address]     Portion Sizes after Weight Loss Surgery  [email or web address]    Your Stage 3  Diet: Pureed Foods  [email or web address]     Pureed Pleasures  [email or web address]    Your Stage 4 Diet: Soft Foods  [email or web address]    Your Stage 5 Diet: Regular Foods  [email or web address]

## 2021-05-19 NOTE — LETTER
"    5/19/2021         RE: Karina Rice  [address]  [facility] [address]        Dear Colleague,    Thank you for referring your patient, Karina Rice, to the [facility]. Please see a copy of my visit note below.    Karina Rice is a 36 year old female who is being evaluated via a billable telephone visit.     The patient has been notified of following:     \"This telephone visit will be conducted via a call between you and your physician/provider. We have found that certain health care needs can be provided without the need for a physical exam.  This service lets us provide the care you need with a short phone conversation.  If a prescription is necessary we can send it directly to your pharmacy.  If lab work is needed we can place an order for that and you can then stop by our lab to have the test done at a later time.    Telephone visits are billed at different rates depending on your insurance coverage. During this emergency period, for some insurers they may be billed the same as an in-person visit.  Please reach out to your insurance provider with any questions.    If during the course of the call the physician/provider feels a telephone visit is not appropriate, you will not be charged for this service.\"    Patient has given verbal consent for Telephone visit?  Yes    What phone number would you like to be contacted at? [phone]    How would you like to obtain your AVS? My chart    Phone call duration: 37 minutes    During this virtual visit the patient is located in [place], patient verifies this as the location during the entirety of this visit.     Nutrition Assessment  Reason For Visit:  Karina Rice is a 36 year old female presenting today for nutrition follow-up, 1 week s/p VSG. Had surgery with Dr. Augusto Keenan on 5/11/2021. Had telephone visit with Arabic  today. Pt's understanding and spoken English are very good, so "  remained online with writer and patient, but pt responded directly to writer's questions and asked her own questions to writer without assistance. She was also able to repeat back recommendations for post op diet progression.    Anthropometrics  Initial Consult Weight: 235 lbs  Day of Surgery Weight: 221 lbs  Current Weight: 209 lbs at home today. In clinic yesterday at 1 week post op visit with CNP was 210 lbs  Weight loss: -26 lbs from initial consult; -12 lbs from day of surgery    Current Vitamins/Minerals: n/a currently    Nutrition History  Pt reports consuming and tolerating bariatric clear and low-fat full liquid diets. Fluid intake appears adequate, consuming 48-64 oz/day.  B: protein shake (30g pro, 160 anthony, 1g sugar)  L: veg broth soup  D: sugar free LF greek yogurt  Snack: SF popsicle or sometimes a second protein drink   Lesly: 48-64 oz/day, sometimes 2-3 T Fat free milk.    Exercise: walking daily    Nutrition Prescription:  Grams Protein: 60 (minimum)  Amount of Fluid: 48-64 oz    Nutrition Diagnosis  Food and nutrition-related knowledge deficit r/t lack of prior exposure to diet advancements beyond bariatric low-fat full liquid diet aeb pt unable to verbalize understanding of bariatric pureed and soft diets.    Intervention  Intervention At Appointment:  Materials/education provided on bariatric pureed and soft diets, protein intake, fluid intake, eating pace, portion control, avoiding excess sugar and fat, recommended vitamin/mineral supplements.    Patient Understanding: good  Expected Compliance: good    Goals:  1) Follow bariatric low-fat full liquid diet until 5/24.   From May 25-June 6, follow a pureed diet (see stage 3 diet handout).  From June 7-July 5, follow a soft diet (see stage 4 diet handout)  Starting July 6, can transition to a regular post vertical sleeve gastrectomy diet (see stage 5 handout)  2) Work towards 60 gm protein/day.  3) Consume 48-64 oz fluids daily- between  meals.  4) Eat slowly (>20 min/meal), chewing well to smooth consistency once on the bariatric soft diet.  5) Limit portions to 1/4-1/2 cup/meal or less. (per your tolerance, stop when you are satisfied.)  6) Start chewable/liquid multivitamin/minerals twice daily (okay to take both multivitamins at the same time if you prefer).  Here is the name of a chewable one that does not contain gelatin/pork.    Bariatric Fusion - Complete Chewable Multivitamin with Iron   - 2 chews per day provides 22 mg iron [email or web address]/collections/complete-chewable/products/mixed-berry-complete-chewable-multivitamin    Follow-Up: scheduled on 6/15 at 11:30 AM    Time spent with patient: 37 minutes.  Gabriella Luna, MS, RD, LD

## 2021-05-28 ENCOUNTER — TELEPHONE (OUTPATIENT)
Dept: ENDOCRINOLOGY | Facility: CLINIC | Age: 36
End: 2021-05-28

## 2021-05-28 NOTE — TELEPHONE ENCOUNTER
Reason for call:  Other   Patient called regarding (reason for call): call back  Additional comments: pt hasn't been feeling well for a couple of days and would like a call back    Phone number to reach patient:  Home number on file [phone] (home)    Best Time:      Can we leave a detailed message on this number?  YES    Travel screening: Not Applicable

## 2021-05-28 NOTE — TELEPHONE ENCOUNTER
Patient states she has had a few episodes of nausea. Discussed patient's diet.  She states she is eating cooked vegetables and some yogurt.  Encouraged patient to eat pureed proteins such as chicken and not concentrate on getting vegetables in.  Patient is getting in at least  50 ounces of water with additional fluids from protein shakes.

## 2021-06-15 ENCOUNTER — VIRTUAL VISIT (OUTPATIENT)
Dept: GASTROENTEROLOGY | Facility: CLINIC | Age: 36
End: 2021-06-15
Payer: COMMERCIAL

## 2021-06-15 ENCOUNTER — OFFICE VISIT (OUTPATIENT)
Dept: ENDOCRINOLOGY | Facility: CLINIC | Age: 36
End: 2021-06-15
Payer: COMMERCIAL

## 2021-06-15 VITALS
DIASTOLIC BLOOD PRESSURE: 65 MMHG | BODY MASS INDEX: 34.36 KG/M2 | WEIGHT: 193.9 LBS | SYSTOLIC BLOOD PRESSURE: 128 MMHG | HEART RATE: 94 BPM | OXYGEN SATURATION: 96 % | HEIGHT: 63 IN

## 2021-06-15 DIAGNOSIS — E66.01 MORBID OBESITY (H): Primary | ICD-10-CM

## 2021-06-15 DIAGNOSIS — E66.01 MORBID OBESITY (H): ICD-10-CM

## 2021-06-15 DIAGNOSIS — Z98.84 S/P LAPAROSCOPIC SLEEVE GASTRECTOMY: Primary | ICD-10-CM

## 2021-06-15 DIAGNOSIS — Z71.3 NUTRITIONAL COUNSELING: ICD-10-CM

## 2021-06-15 DIAGNOSIS — Z98.84 S/P LAPAROSCOPIC SLEEVE GASTRECTOMY: ICD-10-CM

## 2021-06-15 PROCEDURE — 99024 POSTOP FOLLOW-UP VISIT: CPT | Performed by: NURSE PRACTITIONER

## 2021-06-15 PROCEDURE — 97803 MED NUTRITION INDIV SUBSEQ: CPT | Mod: TEL | Performed by: DIETITIAN, REGISTERED

## 2021-06-15 RX ORDER — URSODIOL 300 MG/1
300 CAPSULE ORAL 2 TIMES DAILY
Qty: 60 CAPSULE | Refills: 5 | Status: SHIPPED | OUTPATIENT
Start: 2021-06-15 | End: 2023-04-17

## 2021-06-15 ASSESSMENT — MIFFLIN-ST. JEOR: SCORE: 1543.65

## 2021-06-15 ASSESSMENT — PAIN SCALES - GENERAL: PAINLEVEL: NO PAIN (0)

## 2021-06-15 NOTE — NURSING NOTE
"Chief Complaint   Patient presents with     Follow Up     1 month post-op       Vitals:    06/15/21 0925   BP: 128/65   BP Location: Left arm   Patient Position: Sitting   Cuff Size: Adult Regular   Pulse: 94   SpO2: 96%   Weight: 88 kg (193 lb 14.4 oz)   Height: 1.6 m (5' 3\")       Body mass index is 34.35 kg/m .                         "

## 2021-06-15 NOTE — ASSESSMENT & PLAN NOTE
1 week sp sleeve gastrectomy. Occasional reflux, will continue taking omeprazole. No constipation. Abdominal pain has resolved. Hydration and nutrition have improved dramatically.     Walking 2 hours+ a day. Started running yesterday. Eager to start lifting weights. Questions why weight loss has slowed down. Describes some days when consuming inadequate protein and nutrition but other days okay. Will see RD today.     Tick bite about 2 weeks ago. Recommend follow up with PCP to discuss testing and treatment for Lyme     actigall for gallstone prophylaxis sent today. Open x 2 weeks then can take whole.     Hx of recurrent H.Pylori. Recommend stool test. If positive, would refer to GI for preferred treatment given history. If negative, will forgo treatment per patient preference.     Patient will be leaving the country next month for several months. Will follow up in 4 weeks and get labs prior to leaving.

## 2021-06-15 NOTE — PATIENT INSTRUCTIONS
It was nice speaking with you again today. Below are the nutrition recommendations we discussed at your visit.    Please let me know if you have any additional questions.    Goals:  1) Continue to follow a soft diet for 4 weeks, then to progress to bariatric regular diet (starting July 6).   2) Eat 3 meals per day with protein at all meals. (Okay to replace one of the meal with a protein drink that has at least 20 grams or more of protein. For example Premier protein).  2) Consume 60 grams of protein/day.  3) Sip on 48-64 oz of fluids/day- between meals only.  4) Eat slowly (>20 min/meal), chewing foods well (to applesauce-like consistency).  5) Limit portions to 1/2 cup/meal.  6) Take the following after a Sleeve Gastrectomy: (there is a handout attached below too for you to look at all the recommendations for supplements too).    --Multivitamin/minerals: adult dose 2 times daily (okay to take both at the same time).      --Iron: 18-36 mg- may partly OR if your multivitamin has at least 18 mg of iron, then you do not need to take an addition one unless the doctor or nurse practitioner want you to.        --Calcium Citrate containing vitamin D: 500 mg 3 times daily or 600 mg 2 times daily.      --Vitamin B12: sublingual form (the kind you put under your tongue and let it dissolve under your tongue) of at least 500 mcg daily OR you can speak with Annette Ram about getting an injection of 1000 mcg monthly if you do not want to take B12 by mouth.      --B-50 Complex once daily     Post-op Diet Handouts:  Diet Guidelines after Weight-loss Surgery  [email or web address]     Your Stage 4 Diet: Soft Foods  [email or web address]    Your Stage 5 Diet: Regular Foods  [email or web address]    Supplements after Weight Loss Surgery  [email or web address]     Keeping Track of Fluids  [email or web address]    Exercises after Weight Loss Surgery (strengthening, when no weight lifting  restrictions)  [email or web address]    Your follow up telephone appointment is scheduled for July 13, 2021 at 2:30 PM. If you need to make any changes to your appointment, please call [phone].    Gabriella Luna MS, RD, LD

## 2021-06-15 NOTE — PATIENT INSTRUCTIONS
"It was a pleasure meeting with you today.    Thank you for allowing us the privilege of caring for you. We hope we provided you with the excellent service you deserve.   Please let us know if there is anything else we can do for you so that we can be sure you are leaving completely satisfied with your care experience.    To ensure the quality of our services you may be receiving a patient satisfaction survey from an independent patient satisfaction monitoring company.    The greatest compliment you can give is a \"Likely to Recommend\"      Your visit was with Annette Ram NP today.     Instructions per today's visit:     -stool study in 1 month for H.Pylori- if positive, consider follow up with GI to discuss treatment  -follow up with primary care in 1 week to discuss tick bite   -decrease omeprazole (okay to take full capsule now) - if reflux increases, take omeprazole daily   -consider taking actigall for gallstone prevention   -follow up in 1 month (before travel) - labs at that time       To schedule appointments with our team, please call [phone] option #1    Please call during clinic hours Monday through Friday 8:00a - 4:00p if you have questions or you can contact us via Alea at anytime.      Nurses: [phone]  Fax: [phone]  Surgery Scheduler: [phone]    Please call the \[facility]\"" at [phone] to speak with our on call MDs if you have urgent needs after hours, during weekends, or holidays.    **Please note if you need to go to the Emergency Room for any reason in the first 90 days after surgery it is important to go to the [facility] on the [place] on [facility] off of the [address] off [address]    *For patients who are currently enrolled in our program and have not yet had weight loss surgery please note*:    You must be at your required goal weight at the time of your Anesthesia clinic visit as well as the day of surgery or your surgery will be canceled. You will not " be able to obtain a new surgery date until you have met your goal weight.    Lab results will be communicated through My Chart or letter (if My Chart not used). Please call the clinic if you have not received communication after 1 week or if you have any questions.?    Main follow-up parameters for all bariatric patients     Patients who have undergone Bariatric surgery:    1. Follow-up interval during the first year: ~1 week, 1 month, 3 month, 6 month,12 months.  Every 6 months until 5 years; and then annually thereafter.  The goal of follow-up sessions is to ensure that food intake behavior (choice of food and eating speed) and exercise/activity level are set appropriately.    2. Yearly lab tests ordered by our clinic.      3. The bariatric team should be aware and potentially evaluate all adverse gastrointestinal symptoms (dysphagia, abdominal pain, nausea, vomiting, diarrhea, heartburn, reflux, etc), which can be a sign of complication.  The bariatric surgeons perform general surgery procedures in addition to bariatric surgery (laparoscopic cholecystectomy, etc.)    4. Inability to tolerate textured food (chicken, steak, fish, eggs, beans) is NOT normal and may need to be evaluated by endoscopy performed by the bariatric surgeon.    _____________________________________________________________________________________________________________________________    Meal Replacement Products:    Here is the link to our new e-store where you can purchase our meal replacement products    [facility] E-Store  [employer]Tu Closet Mi Closet/store    The one week starter kit is a great way to sample a variety of products and see what works for you.    If you want more information about the product go to: [employer] and Chew    Free Shipping for orders over $75     Benefits of meal replacements products:    Portion and calorie control  Improved nutrition  Structured eating  Simplified food choices  Avoid contact  with trigger foods  ______________________________________________________________________________________________________________________________    Healthy Lifestyle Support Group   [facility] Weight Management Program  Virtual Support Group Now Available    60 minutes of small group guided discussion, support and resources    Led by Health , Zuleyma Mayberry    For questions, and to receive the invite information, contact Zuleyma at [email or web address]    All our welcome!    One Friday per month, 12:30pm to 1:30pm  SELF COMPASSION: July 31st  CREATING MY WELLNESS VISION: August 28th  MINDFULNESS PRACTICES FOR A CALM BODY & MIND: September 25th  MINDFUL EATING TOOLS: October 30th  HEALTHY& HAPPY HOLIDAYS: November 20th  OPEN FORUM: December 18th  _______________________________________________________________________________________________________________________________    Connections: Bariatric Care support group  Due to the Covid-19 restrictions, we will be doing our support group virtually using Microsoft Teams. You will need to get an invitation to the group from Geovanni Doran, Ph.D., LP at [email or web address] and to learn about using Microsoft Teams. The next meeting is on Tuesday, July 14 between 6:30 and 8 PM and there will be no formal speaker.    This group meets the second Tuesday of each month from 6:30 to 8 PM and will be held again at [facility] in the  [facility] [place] (A-B room) when the Covid-19 restrictions are lifted. The group is led by Geovanni Doran, Ph.D., Licensed Psychologist, [facility] Comprehensive Weight Management Program. There is no cost for group participation and is open to all [facility] (and those external to this program) pre- and post-operative bariatric surgery patients as well as their support system.    _________________________________________________________________________________________________________________________________      Interested in working with a health ?  Health coaches work with you to improve your overall health and wellbeing.  They look at the whole person, and may involve discussion of different areas of life, including, but not limited to the four pillars of health (sleep, exercise, nutrition, and stress management). Discuss with your care team if you would like to start working a health .     Health Coaching-3 Pack:      $99 for three health coaching visits    Visits may be done in person or via phone    Coaching is a partnership between the  and the client; Coaches do not prescribe or diagnose    Coaching helps inspire the client to reach his/her personal goals   __________________________________________________________________________________________________________________________________    [facility] Get Moving Program    Our team of physical therapists is trained to help you understand and take control of your condition. They will perform a thorough evaluation to determine your ability for activity and develop a customized plan to fit your goals and physical ability.  Scheduling: Unsure if the Get Moving program is right for you? Discuss the program with your medical provider or diabetes educator. You can also call us at [phone] to ask questions or schedule an appointment.   [facility] Get Moving Program  ______________________________________________________________________________________________________________________  Bluetooth Scale:    We hope to provide you with high quality telephone and virtual healthcare visits while social distancing for COVID-19 is necessary, as well as in the future when virtual visits may be more convenient for you.     Our technology team made it possible for [employer] scales to send weight measurements to our  electronic medical record. This allows weights from you weighing at home to securely flow into the medical record, which will improve telephone and virtual visits.   Additionally, studies have shown that adults actually lose more weight when their weights are automatically sent to someone else, and also that this process is not stressful for those adults.    Below is a link for purchasing the scale, with a discount code for our patients. You may call your insurance company to see if they will reimburse you for the cost of the scale, as a piece of durable medical equipment. The scales only go up to a weight of 400 pounds. This is an issue and we are working with the developer on increasing this. We found no scales that go over 400lb that have blue-tooth for connecting to [employer].    Scale to purchase: the ikeGPS  Body  Scale: [email or web address]=aw.ds    Discount Code: We have a discount code for our patients to bring the cost down to $50, the code is: [place]_Scale_20%off    Steps to link the scale to [employer] via an Android Phone (you can always disconnect at any point in the future):  1. The order must be placed first before the patient can access Track My Health within [employer].  2. Download Google Fit rachel from the Google Play Store   a. Log in or register using your Google account   3. Download the [employer] rachel from Google Play Store  a. Select add organization   b. Search for Tellybean and select it   c. Log into [employer]  d. Select Track My Health   e. Select the green connect my account button   f. When prompted log into your Google account   g. Select okay to confirm the account   4. Download the [employer] [facility] Mate rachel from Google Play Store  a. [place] for ikeGPS   b. Go to profile   c. Tap google fit under the Apps section  d. Select the option to activate Google Fit integration   e. Select the same Google  account   f. Select okay to confirm the account  g.   Steps to link the scale to [employer] via an iPhone (you can always disconnect at any point in the future):  **Note Skuid is not available for download on an iPad**  1. The order must be placed first before the patient can access Track My Health within [employer].  2. Locate the Health mando on your iPhone.  a. Set up your Apple Health account as prompted  b. The Sources page will show Apps that communicate with your Health mando. Once all steps are completed, you should see [employer]Worlds and [employer]ikost listed under the Apps section and your iPhone under the devices section.  i. Select Health Mate  1. Under 'ALLOW  NLT SPINE  TO WRITE DATA ensure the toggle is on for Weight.  2. This will allow the scale to add your weight to the Apple Health  ii. Select MyChart  1. Under 'ALLOW  [employer]  TO READ DATA ensure the toggle is on for Weight.  2. This allows [employer] to grab the weight from Skuid so your provider can see your weights.  3. Download the [employer] mando from the Mando Store   a. Select add organization                                                  b. Search for [employer] and select it  c. Log into [employer]  d. Select Track My Health   e. Select the green connect my account button   f. Follow prompts to link your device to [employer].  4. Download the [facility] health mate mando in the Mando Store   a. [place] for MeMeMe   b. Go to profile   c. [employer] under the Apps section  d. If prompted to allow access with the Health Mando, toggle weight on for read and write access.

## 2021-06-15 NOTE — LETTER
6/15/2021         RE: Karina Rice  [address]  [place] [address]        Dear Colleague,    Thank you for referring your patient, Karina Rice, to the [facility] [place]. Please see a copy of my visit note below.    Nutrition Reassessment  Reason For Visit:  Karina Rice is a 36 year old female presenting today for nutrition follow-up, 1 month s/p VSG. Had surgery with Dr. Augusto Keenan on 5/11/2021. Had telephone visit with Arabic  today. Pt's understanding and spoken English are very good, so  remained online with writer and patient, but pt responded directly to writer's questions and asked her own questions to writer without assistance. She was also able to repeat back recommendations for post op diet progression.       Anthropometrics:  Initial Consult Weight: 235 lbs  Day of Surgery Weight: 221 lbs  Current Weight: 193 lbs  Weight loss: -42 lbs from initial consult; -28 lbs from day of surgery    Current Vitamins/Minerals: taking chewable bariatric MVI/minerals     Nutrition History:    She currently eats 2-3 meals per day. She wakes up feeling thirsty so tends to drink water and will have watermelon only sometimes for breakfast due to feeling more thirsty. She also like to walk in the morning after getting up. She also does not like to eat after 6pm, so if it gets late, she may not eat dinner. She is tolerating about 3-4 T of food at a meal. She is planning to buy some protein drinks from her gym. She has been walking and started running 2 days ago. She ask if she can start a cup of decaf or regular coffee 1x/week as well.    Diet recall:  B: watermelon OR 1 egg omelet with tomato or homemade carrot juice  L: 11-12 PM: tuna/salmon, peeled cucumbers with a little sour cream or sausage with 2-3 pc peeled cucumbers or bite of grilled ribs and eggplant with green and red peppers.    D: tuna with 2-3 slice cucumber with fat free  sour cream or similar to lunch meals.  Lesly: 6 bottles of water (>64 oz), sugar free vitamin water zero if very thirsty.    Progress with Previous Goals:  1) Follow bariatric low-fat full liquid diet through day 13 post-op, then to progress to pureed diet x 2 weeks.  If tolerating, may advance on day 29 post-op to bariatric soft diet. -Meeting  2) Work towards 60 gm protein/day. -not consistently based on diet recall  3) Consume 48-64 oz fluids daily- between meals. meeting   4) Eat slowly (>20 min/meal), chewing well to smooth consistency once on the bariatric soft diet. Since meals are about 3-4 T, she estimated taking about 10 minutes to eat a meal.   5) Limit portions to 1/2 cup/meal. -meeting estimates eating about 3-4T (~1/4 c or less).   6) Start chewable/liquid multivitamin/minerals twice daily. Meeting.     Nutrition Prescription:  Grams Protein: 60 (minimum)   Amount of Fluid: 48-64 oz    Nutrition Diagnosis  Previous: Food and nutrition-related knowledge deficit r/t lack of prior exposure to diet advancements beyond bariatric low-fat full liquid diet aeb pt unable to verbalize understanding of bariatric pureed and soft diets.    Current: Food and nutrition-related knowledge deficit r/t lack of prior exposure to diet advancements beyond bariatric pureed diet aeb pt unable to verbalize full understanding of bariatric soft and regular consistency diets.    Intervention  Materials/Education provided on bariatric soft and regular consistency diets, protein intake, fluid intake, eating pace, chewing foods well, portion control, sugar/fat intake, recommended vitamin/mineral supplements. Told pt okay to have 1 cup of either decaf or regular coffee. Explained recommendation to drink adequate water as she is doing so okay for a cup of coffee especially since she only plans to have ~1x/week with friends and is drinking >64 oz water currently. Patient demonstrates understanding.     Expected Engagement:  good    Goals:  1) Continue to follow a soft diet for 4 weeks, then to progress to bariatric regular diet (starting July 6).   2) Eat 3 meals per day with protein at all meals. (Okay to replace one of the meal with a protein drink that has at least 20 grams or more of protein. For example Premier protein).  2) Consume 60 grams of protein/day.  3) Sip on 48-64 oz of fluids/day- between meals only.  4) Eat slowly (>20 min/meal), chewing foods well (to applesauce-like consistency).  5) Limit portions to 1/2 cup/meal.  6) Take the following after a Sleeve Gastrectomy: (there is a handout attached below too for you to look at all the recommendations for supplements too).    --Multivitamin/minerals: adult dose 2 times daily (okay to take both at the same time).      --Iron: 18-36 mg- may partly OR if your multivitamin has at least 18 mg of iron, then you do not need to take an addition one unless the doctor or nurse practitioner want you to.        --Calcium Citrate containing vitamin D: 500 mg 3 times daily or 600 mg 2 times daily.      --Vitamin B12: sublingual form (the kind you put under your tongue and let it dissolve under your tongue) of at least 500 mcg daily OR you can speak with Annette Ram about getting an injection of 1000 mcg monthly if you do not want to take B12 by mouth.      --B-50 Complex once daily     Post-op Diet Handouts:  Diet Guidelines after Weight-loss Surgery  [email or web address]     Your Stage 4 Diet: Soft Foods  [email or web address]    Your Stage 5 Diet: Regular Foods  [email or web address]    Supplements after Weight Loss Surgery  [email or web address]     Keeping Track of Fluids  [email or web address]    Exercises after Weight Loss Surgery (strengthening, when no weight lifting restrictions)  [email or web address]    Follow-Up: scheduled July 13 at 2:30 PM for phone visit follow up.    Time spent with patient: 30 minutes.  Gabriella  MS Jeremy, RD, LD            Again, thank you for allowing me to participate in the care of your patient.        Sincerely,        Gabriella Luna RD

## 2021-06-15 NOTE — PROGRESS NOTES
Nutrition Reassessment  Reason For Visit:  Karina Rice is a 36 year old female presenting today for nutrition follow-up, 1 month s/p VSG. Had surgery with Dr. Augusto Keenan on 5/11/2021. Had telephone visit with Arabic  today. Pt's understanding and spoken English are very good, so  remained online with writer and patient, but pt responded directly to writer's questions and asked her own questions to writer without assistance. She was also able to repeat back recommendations for post op diet progression.       Anthropometrics:  Initial Consult Weight: 235 lbs  Day of Surgery Weight: 221 lbs  Current Weight: 193 lbs  Weight loss: -42 lbs from initial consult; -28 lbs from day of surgery    Current Vitamins/Minerals: taking chewable bariatric MVI/minerals     Nutrition History:    She currently eats 2-3 meals per day. She wakes up feeling thirsty so tends to drink water and will have watermelon only sometimes for breakfast due to feeling more thirsty. She also like to walk in the morning after getting up. She also does not like to eat after 6pm, so if it gets late, she may not eat dinner. She is tolerating about 3-4 T of food at a meal. She is planning to buy some protein drinks from her gym. She has been walking and started running 2 days ago. She ask if she can start a cup of decaf or regular coffee 1x/week as well.    Diet recall:  B: watermelon OR 1 egg omelet with tomato or homemade carrot juice  L: 11-12 PM: tuna/salmon, peeled cucumbers with a little sour cream or sausage with 2-3 pc peeled cucumbers or bite of grilled ribs and eggplant with green and red peppers.    D: tuna with 2-3 slice cucumber with fat free sour cream or similar to lunch meals.  Lesly: 6 bottles of water (>64 oz), sugar free vitamin water zero if very thirsty.    Progress with Previous Goals:  1) Follow bariatric low-fat full liquid diet through day 13 post-op, then to progress to pureed diet x 2 weeks.  If  tolerating, may advance on day 29 post-op to bariatric soft diet. -Meeting  2) Work towards 60 gm protein/day. -not consistently based on diet recall  3) Consume 48-64 oz fluids daily- between meals. meeting   4) Eat slowly (>20 min/meal), chewing well to smooth consistency once on the bariatric soft diet. Since meals are about 3-4 T, she estimated taking about 10 minutes to eat a meal.   5) Limit portions to 1/2 cup/meal. -meeting estimates eating about 3-4T (~1/4 c or less).   6) Start chewable/liquid multivitamin/minerals twice daily. Meeting.     Nutrition Prescription:  Grams Protein: 60 (minimum)   Amount of Fluid: 48-64 oz    Nutrition Diagnosis  Previous: Food and nutrition-related knowledge deficit r/t lack of prior exposure to diet advancements beyond bariatric low-fat full liquid diet aeb pt unable to verbalize understanding of bariatric pureed and soft diets.    Current: Food and nutrition-related knowledge deficit r/t lack of prior exposure to diet advancements beyond bariatric pureed diet aeb pt unable to verbalize full understanding of bariatric soft and regular consistency diets.    Intervention  Materials/Education provided on bariatric soft and regular consistency diets, protein intake, fluid intake, eating pace, chewing foods well, portion control, sugar/fat intake, recommended vitamin/mineral supplements. Told pt okay to have 1 cup of either decaf or regular coffee. Explained recommendation to drink adequate water as she is doing so okay for a cup of coffee especially since she only plans to have ~1x/week with friends and is drinking >64 oz water currently. Patient demonstrates understanding.     Expected Engagement: good    Goals:  1) Continue to follow a soft diet for 4 weeks, then to progress to bariatric regular diet (starting July 6).   2) Eat 3 meals per day with protein at all meals. (Okay to replace one of the meal with a protein drink that has at least 20 grams or more of protein. For  example Premier protein).  2) Consume 60 grams of protein/day.  3) Sip on 48-64 oz of fluids/day- between meals only.  4) Eat slowly (>20 min/meal), chewing foods well (to applesauce-like consistency).  5) Limit portions to 1/2 cup/meal.  6) Take the following after a Sleeve Gastrectomy: (there is a handout attached below too for you to look at all the recommendations for supplements too).    --Multivitamin/minerals: adult dose 2 times daily (okay to take both at the same time).      --Iron: 18-36 mg- may partly OR if your multivitamin has at least 18 mg of iron, then you do not need to take an addition one unless the doctor or nurse practitioner want you to.        --Calcium Citrate containing vitamin D: 500 mg 3 times daily or 600 mg 2 times daily.      --Vitamin B12: sublingual form (the kind you put under your tongue and let it dissolve under your tongue) of at least 500 mcg daily OR you can speak with Annette Ram about getting an injection of 1000 mcg monthly if you do not want to take B12 by mouth.      --B-50 Complex once daily     Post-op Diet Handouts:  Diet Guidelines after Weight-loss Surgery  [email or web address]     Your Stage 4 Diet: Soft Foods  [email or web address]    Your Stage 5 Diet: Regular Foods  [email or web address]    Supplements after Weight Loss Surgery  [email or web address]     Keeping Track of Fluids  [email or web address]    Exercises after Weight Loss Surgery (strengthening, when no weight lifting restrictions)  [email or web address]    Follow-Up: scheduled July 13 at 2:30 PM for phone visit follow up.    Time spent with patient: 30 minutes.  Gabriella Luna, MS, RD, LD

## 2021-06-15 NOTE — LETTER
"6/15/2021       RE: Karina Rice  [address]  [facility] [address]     Dear Colleague,    Thank you for referring your patient, Karina Rice, to the [facility] [place] at [facility]. Please see a copy of my visit note below.    Postoperative bariatric surgery visit.    Patient underwent sleeve gastrectomy 5 weeks ago.  5/11/2021    Dehydration at last visit.     Tolerating liquids: yes, no issues   Lightheadedness: in the morning, feels very thirsty   Abdominal pain: resolved  Bowel movements: normal   Fevers/shakes/chills: none  GERD: occasional Taking omeprazole once daily   Leg/calf pain: none     Walking 2 hr a day, started running yesterday     Tick found at umbilicus about 2 weeks ago. Patient believes it was attached for 3 days- was black and attached when found, difficult to remove. Has since become irritated and excoriated from scratching.    How many opioid pain medications used after surgery?  What did you do with extra pills?  Were any opioid pain medication refills provided after surgery?  Were any opioid pain medications needed after 30 days postop?    /65 (BP Location: Left arm, Patient Position: Sitting, Cuff Size: Adult Regular)   Pulse 94   Ht 1.6 m (5' 3\")   Wt 88 kg (193 lb 14.4 oz)   SpO2 96%   BMI 34.35 kg/m     Wt Readings from Last 5 Encounters:   06/15/21 88 kg (193 lb 14.4 oz)   05/18/21 95.3 kg (210 lb 3.2 oz)   05/11/21 100.3 kg (221 lb 1.9 oz)      NAD  Overall looks well   Incisions c/d/i; good- umbilical incision- has excoriation which is slightly erythematous and moist. Patient points to a red spot where she believes there was a tick attacked about 2 weeks ago with erythema around the bite. Where she points to looks excoriated and no obvious bullseye at that time      Plan:  1. RD visit today.  2. Start vitamin supplements per RD directions.  3. Advance diet per RD " directions.  4. Follow-up: 2 months   5. Actigall prescription sent to pharmacy   6. B12 SL or injection **  7. Pathology reviewed - + H.PYlroi- treated preop- stool test?    8. Weight loss medications: none     Follow up with primary care in 1 week about tick bite       1 week sp sleeve gastrectomy. Occasional reflux, will continue taking omeprazole. No constipation. Abdominal pain has resolved. Hydration and nutrition have improved dramatically.     Walking 2 hours+ a day. Started running yesterday. Eager to start lifting weights. Questions why weight loss has slowed down. Describes some days when consuming inadequate protein and nutrition but other days okay. Will see RD today.     Tick bite about 2 weeks ago. Recommend follow up with PCP to discuss testing and treatment for Lyme     actigall for gallstone prophylaxis sent today. Open x 2 weeks then can take whole.     Hx of recurrent H.Pylori. Recommend stool test. If positive, would refer to GI for preferred treatment given history. If negative, will forgo treatment per patient preference.     Patient will be leaving the country next month for several months. Will follow up in 4 weeks and get labs prior to leaving.         Annette Ram, CNP  [facility] WE[facility]NAG[facility] [place]

## 2021-06-15 NOTE — PROGRESS NOTES
"Postoperative bariatric surgery visit.    Patient underwent sleeve gastrectomy 5 weeks ago.  5/11/2021    Dehydration at last visit.     Tolerating liquids: yes, no issues   Lightheadedness: in the morning, feels very thirsty   Abdominal pain: resolved  Bowel movements: normal   Fevers/shakes/chills: none  GERD: occasional Taking omeprazole once daily   Leg/calf pain: none     Walking 2 hr a day, started running yesterday     Tick found at umbilicus about 2 weeks ago. Patient believes it was attached for 3 days- was black and attached when found, difficult to remove. Has since become irritated and excoriated from scratching.    How many opioid pain medications used after surgery?  What did you do with extra pills?  Were any opioid pain medication refills provided after surgery?  Were any opioid pain medications needed after 30 days postop?    /65 (BP Location: Left arm, Patient Position: Sitting, Cuff Size: Adult Regular)   Pulse 94   Ht 1.6 m (5' 3\")   Wt 88 kg (193 lb 14.4 oz)   SpO2 96%   BMI 34.35 kg/m     Wt Readings from Last 5 Encounters:   06/15/21 88 kg (193 lb 14.4 oz)   05/18/21 95.3 kg (210 lb 3.2 oz)   05/11/21 100.3 kg (221 lb 1.9 oz)      NAD  Overall looks well   Incisions c/d/i; good- umbilical incision- has excoriation which is slightly erythematous and moist. Patient points to a red spot where she believes there was a tick attacked about 2 weeks ago with erythema around the bite. Where she points to looks excoriated and no obvious bullseye at that time      Plan:  1. RD visit today.  2. Start vitamin supplements per RD directions.  3. Advance diet per RD directions.  4. Follow-up: 2 months   5. Actigall prescription sent to pharmacy   6. B12 SL or injection **  7. Pathology reviewed - + H.PYlroi- treated preop- stool test?    8. Weight loss medications: none     Follow up with primary care in 1 week about tick bite       1 week sp sleeve gastrectomy. Occasional reflux, will continue " taking omeprazole. No constipation. Abdominal pain has resolved. Hydration and nutrition have improved dramatically.     Walking 2 hours+ a day. Started running yesterday. Eager to start lifting weights. Questions why weight loss has slowed down. Describes some days when consuming inadequate protein and nutrition but other days okay. Will see RD today.     Tick bite about 2 weeks ago. Recommend follow up with PCP to discuss testing and treatment for Lyme     actigall for gallstone prophylaxis sent today. Open x 2 weeks then can take whole.     Hx of recurrent H.Pylori. Recommend stool test. If positive, would refer to GI for preferred treatment given history. If negative, will forgo treatment per patient preference.     Patient will be leaving the country next month for several months. Will follow up in 4 weeks and get labs prior to leaving.         Annette Ram CNP  [facility] WEIGHT [facility]NAG[facility] CLIN[facility] [place]

## 2021-06-27 ENCOUNTER — HEALTH MAINTENANCE LETTER (OUTPATIENT)
Age: 36
End: 2021-06-27

## 2021-07-13 ENCOUNTER — VIRTUAL VISIT (OUTPATIENT)
Dept: GASTROENTEROLOGY | Facility: CLINIC | Age: 36
End: 2021-07-13
Payer: COMMERCIAL

## 2021-07-13 DIAGNOSIS — Z86.39 HISTORY OF MORBID OBESITY: ICD-10-CM

## 2021-07-13 DIAGNOSIS — Z71.3 NUTRITIONAL COUNSELING: ICD-10-CM

## 2021-07-13 DIAGNOSIS — Z98.84 S/P LAPAROSCOPIC SLEEVE GASTRECTOMY: Primary | ICD-10-CM

## 2021-07-13 PROCEDURE — 97803 MED NUTRITION INDIV SUBSEQ: CPT | Mod: TEL | Performed by: DIETITIAN, REGISTERED

## 2021-07-13 NOTE — LETTER
7/13/2021         RE: Karina Rice  [address]  [facility] [address]        Dear Colleague,    Thank you for referring your patient, Karina Rice, to the [facility]OENTEROLOG[facility] [place]. Please see a copy of my visit note below.    Nutrition Reassessment  Reason For Visit:  Karina Rice is a 36 year old female presenting today for nutrition follow-up, ~2 months (9 weeks) s/p VSG. Had surgery with Dr. Augusto Keenan on 5/11/2021. Had telephone visit with Arabic  today. Pt's understanding and spoken English are very good, usually we have the  remain online with writer and patient and she responds without assistance. She was out of the house in public today, so she used the  today to relay some information and questions to writer. She also directly asked and answered writer questions.     Anthropometrics:  Initial Consult Weight: 235 lbs  Day of Surgery Weight: 221 lbs  Current Weight: 175 lbs  Weight loss: -60 lbs from initial consult; -46 lbs from day of surgery    Current Vitamins/Minerals: taking chewable bariatric MVI/minerals     Nutrition History:    She currently eats 2-3 meals per day and eating some protein at all meals. Overall she feels very good and an improvement in her health. Her biggest concern is that she is having signicant constipation and may got 10-15 days without a BM. She takes miralax but it's not helping. She drinks 4 bottles (64 oz) water per day.     Diet recall:  B: ~4 spoonfuls eggs or protein shake  L: 11-12 PM: tuna/salmon and vegetables, cucumbers  D: similar to lunch or may skip sometimes.  Snacks: none  Lesly: 4 bottles of water (~64 oz), sometimes sugar free vitamin water zero. occas protein drink for breakfast  Has not tried coffee yet, but would like to have 1 cup coffee occasionally such as once per week.    Progress with Previous Goals:  1) Continue to follow a soft diet for 4 weeks, then to progress to  bariatric regular diet (starting July 6). -meeting  2) Eat 3 meals per day with protein at all meals. (Okay to replace one of the meal with a protein drink that has at least 20 grams or more of protein. For example Premier protein).-mostly meeting, occas only 2 meals per day.  2) Consume 60 grams of protein/day.-meeting  3) Sip on 48-64 oz of fluids/day- between meals only.-meeting  4) Eat slowly (>20 min/meal), chewing foods well (to applesauce-like consistency).-mostly meeting usually 30 minutes occas 15 mins  5) Limit portions to 1/2 cup/meal.-Meeting  6) Take the following after a Sleeve Gastrectomy: -meeting. She stopped supps for about 5 days due to having issues with nausea/vomiting if takes on an empty stomach but restarted and make sure she takes with some with food.      --Multivitamin/minerals: adult dose 2 times daily (okay to take both at the same time).    --Iron: 18-36 mg- may partly OR if your multivitamin has at least 18 mg of iron, then you do not need to take an addition one unless the doctor or nurse practitioner want you to.     --Calcium Citrate containing vitamin D: 500 mg 3 times daily or 600 mg 2 times daily.    Nutrition Prescription:  Grams Protein: 60 (minimum)   Amount of Fluid: 48-64 oz    Nutrition Diagnosis    Previous: Food and nutrition-related knowledge deficit r/t lack of prior exposure to diet advancements beyond bariatric pureed diet aeb pt unable to verbalize full understanding of bariatric soft and regular consistency diets.    Current: Food and nutrition related knowledge deficit related to lack of previous diet education/lack of complete recall of previous diet education as evidenced by pt report and interest in diet education/review.     Intervention  Materials/Education review provided on regular consistency diets, protein intake, fluid intake, eating pace, chewing foods well, portion control, sugar/fat intake, recommended vitamin/mineral supplements. Told pt okay to have  1 cup of regular coffee occasionally. Recommended black or okay for small amount cream (if tolerated as well as SF substitute). Explained recommendation to drink adequate water as she is doing so okay for a cup of coffee especially since she only plans to have ~1x/week with friends and is drinking 64 oz water currently. Told pt writer would message CNP re: issues with constipation. Patient demonstrates understanding.     Expected Engagement: good    Goals:  1) Continue to eat a bariatric regular diet.  2) Eat 3 meals per day with protein at all meals. (Okay to replace one of the meal with a protein drink that has at least 20 grams or more of protein. For example Premier protein).  2) Consume 60 grams of protein/day.  3) Sip on 48-64 oz of fluids/day- between meals only.  4) Eat slowly (>20 min/meal), chewing foods well (to applesauce-like consistency).  5) Limit portions to 1/2-3/4 cup/meal.  6) Take the following after a Sleeve Gastrectomy: (there is a handout attached below too for you to look at all the recommendations for supplements too).    --Multivitamin/minerals: adult dose 2 times daily (okay to take both at the same time).      --Iron: 18-36 mg- may partly OR if your multivitamin has at least 18 mg of iron, then you do not need to take an addition one unless the doctor or nurse practitioner want you to.        --Calcium Citrate containing vitamin D: 500 mg 3 times daily or 600 mg 2 times daily.      --Vitamin B12: sublingual form (the kind you put under your tongue and let it dissolve under your tongue) of at least 500 mcg daily OR you can speak with Annette Ram about getting an injection of 1000 mcg monthly if you do not want to take B12 by mouth.      --B-50 Complex once daily     Follow-Up: in about 1-2 months (which is 3-4 months post op) after your trip.    Time spent with patient: 30 minutes.  Gabriella Luna, MS, RD, LD            Again, thank you for allowing me to participate in the care of your  patient.        Sincerely,        Gabriella Luna RD

## 2021-07-13 NOTE — PROGRESS NOTES
Nutrition Reassessment  Reason For Visit:  Karina Rice is a 36 year old female presenting today for nutrition follow-up, ~2 months (9 weeks) s/p VSG. Had surgery with Dr. Augusto Keenan on 5/11/2021. Had telephone visit with Arabic  today. Pt's understanding and spoken English are very good, usually we have the  remain online with writer and patient and she responds without assistance. She was out of the house in public today, so she used the  today to relay some information and questions to writer. She also directly asked and answered writer questions.     Anthropometrics:  Initial Consult Weight: 235 lbs  Day of Surgery Weight: 221 lbs  Current Weight: 175 lbs  Weight loss: -60 lbs from initial consult; -46 lbs from day of surgery    Current Vitamins/Minerals: taking chewable bariatric MVI/minerals     Nutrition History:    She currently eats 2-3 meals per day and eating some protein at all meals. Overall she feels very good and an improvement in her health. Her biggest concern is that she is having signicant constipation and may got 10-15 days without a BM. She takes miralax but it's not helping. She drinks 4 bottles (64 oz) water per day.     Diet recall:  B: ~4 spoonfuls eggs or protein shake  L: 11-12 PM: tuna/salmon and vegetables, cucumbers  D: similar to lunch or may skip sometimes.  Snacks: none  Lesly: 4 bottles of water (~64 oz), sometimes sugar free vitamin water zero. occas protein drink for breakfast  Has not tried coffee yet, but would like to have 1 cup coffee occasionally such as once per week.    Progress with Previous Goals:  1) Continue to follow a soft diet for 4 weeks, then to progress to bariatric regular diet (starting July 6). -meeting  2) Eat 3 meals per day with protein at all meals. (Okay to replace one of the meal with a protein drink that has at least 20 grams or more of protein. For example Premier protein).-mostly meeting, occas only 2 meals per  day.  2) Consume 60 grams of protein/day.-meeting  3) Sip on 48-64 oz of fluids/day- between meals only.-meeting  4) Eat slowly (>20 min/meal), chewing foods well (to applesauce-like consistency).-mostly meeting usually 30 minutes occas 15 mins  5) Limit portions to 1/2 cup/meal.-Meeting  6) Take the following after a Sleeve Gastrectomy: -meeting. She stopped supps for about 5 days due to having issues with nausea/vomiting if takes on an empty stomach but restarted and make sure she takes with some with food.      --Multivitamin/minerals: adult dose 2 times daily (okay to take both at the same time).    --Iron: 18-36 mg- may partly OR if your multivitamin has at least 18 mg of iron, then you do not need to take an addition one unless the doctor or nurse practitioner want you to.     --Calcium Citrate containing vitamin D: 500 mg 3 times daily or 600 mg 2 times daily.    Nutrition Prescription:  Grams Protein: 60 (minimum)   Amount of Fluid: 48-64 oz    Nutrition Diagnosis    Previous: Food and nutrition-related knowledge deficit r/t lack of prior exposure to diet advancements beyond bariatric pureed diet aeb pt unable to verbalize full understanding of bariatric soft and regular consistency diets.    Current: Food and nutrition related knowledge deficit related to lack of previous diet education/lack of complete recall of previous diet education as evidenced by pt report and interest in diet education/review.     Intervention  Materials/Education review provided on regular consistency diets, protein intake, fluid intake, eating pace, chewing foods well, portion control, sugar/fat intake, recommended vitamin/mineral supplements. Told pt okay to have 1 cup of regular coffee occasionally. Recommended black or okay for small amount cream (if tolerated as well as SF substitute). Explained recommendation to drink adequate water as she is doing so okay for a cup of coffee especially since she only plans to have ~1x/week  with friends and is drinking 64 oz water currently. Told pt writer would message CNP re: issues with constipation. Patient demonstrates understanding.     Expected Engagement: good    Goals:  1) Continue to eat a bariatric regular diet.  2) Eat 3 meals per day with protein at all meals. (Okay to replace one of the meal with a protein drink that has at least 20 grams or more of protein. For example Premier protein).  2) Consume 60 grams of protein/day.  3) Sip on 48-64 oz of fluids/day- between meals only.  4) Eat slowly (>20 min/meal), chewing foods well (to applesauce-like consistency).  5) Limit portions to 1/2-3/4 cup/meal.  6) Take the following after a Sleeve Gastrectomy: (there is a handout attached below too for you to look at all the recommendations for supplements too).    --Multivitamin/minerals: adult dose 2 times daily (okay to take both at the same time).      --Iron: 18-36 mg- may partly OR if your multivitamin has at least 18 mg of iron, then you do not need to take an addition one unless the doctor or nurse practitioner want you to.        --Calcium Citrate containing vitamin D: 500 mg 3 times daily or 600 mg 2 times daily.      --Vitamin B12: sublingual form (the kind you put under your tongue and let it dissolve under your tongue) of at least 500 mcg daily OR you can speak with Annette Ram about getting an injection of 1000 mcg monthly if you do not want to take B12 by mouth.      --B-50 Complex once daily     Follow-Up: in about 1-2 months (which is 3-4 months post op) after your trip.    Time spent with patient: 30 minutes.  Gabriella Luna, MS, RD, LD

## 2021-07-13 NOTE — PATIENT INSTRUCTIONS
It was nice speaking with you today. Below are the nutrition recommendations we discussed at your visit. I also messaged SOLE Michaels CNP and she responded that you can increase your miralax to twice per day and you can also take Benefiber up to 3 times per day until you are having more regular bowel movements. She said you should speak with your primary care doctor about potential traveller's diarrhea.      Please let me know if you have any additional questions.    Goals:  1) Continue to eat a bariatric regular diet.  2) Eat 3 meals per day with protein at all meals. (Okay to replace one of the meal with a protein drink that has at least 20 grams or more of protein. For example Premier protein).  2) Consume 60 grams of protein/day.  3) Sip on 48-64 oz of fluids/day- between meals only.  4) Eat slowly (>20 min/meal), chewing foods well (to applesauce-like consistency).  5) Limit portions to 1/2-3/4 cup/meal.  6) Take the following after a Sleeve Gastrectomy: (there is a handout attached below too for you to look at all the recommendations for supplements too).    --Multivitamin/minerals: adult dose 2 times daily (okay to take both at the same time).      --Iron: 18-36 mg- may partly OR if your multivitamin has at least 18 mg of iron, then you do not need to take an addition one unless the doctor or nurse practitioner want you to.        --Calcium Citrate containing vitamin D: 500 mg 3 times daily or 600 mg 2 times daily.      --Vitamin B12: sublingual form (the kind you put under your tongue and let it dissolve under your tongue) of at least 500 mcg daily OR you can speak with Annette Ram about getting an injection of 1000 mcg monthly if you do not want to take B12 by mouth.      --B-50 Complex once daily     Follow up in 1-2 months (which is 3-4 months post surgery).    If you would like to schedule a follow up appointment with Gabriella Luna, Registered Dietitian, please call [phone].    Gabriella Luna, MS,  [place], [place]ILLY

## 2021-10-17 ENCOUNTER — HEALTH MAINTENANCE LETTER (OUTPATIENT)
Age: 36
End: 2021-10-17

## 2022-07-24 ENCOUNTER — HEALTH MAINTENANCE LETTER (OUTPATIENT)
Age: 37
End: 2022-07-24

## 2022-10-03 ENCOUNTER — HEALTH MAINTENANCE LETTER (OUTPATIENT)
Age: 37
End: 2022-10-03

## 2023-04-17 ENCOUNTER — OFFICE VISIT (OUTPATIENT)
Dept: ENDOCRINOLOGY | Facility: CLINIC | Age: 38
End: 2023-04-17
Payer: COMMERCIAL

## 2023-04-17 ENCOUNTER — LAB (OUTPATIENT)
Dept: LAB | Facility: CLINIC | Age: 38
End: 2023-04-17
Payer: COMMERCIAL

## 2023-04-17 VITALS
BODY MASS INDEX: 26.77 KG/M2 | DIASTOLIC BLOOD PRESSURE: 73 MMHG | WEIGHT: 151.1 LBS | OXYGEN SATURATION: 100 % | HEIGHT: 63 IN | SYSTOLIC BLOOD PRESSURE: 123 MMHG | HEART RATE: 89 BPM

## 2023-04-17 DIAGNOSIS — L98.7 EXCESS SKIN OF ABDOMEN: ICD-10-CM

## 2023-04-17 DIAGNOSIS — E66.3 OVERWEIGHT (BMI 25.0-29.9): Primary | ICD-10-CM

## 2023-04-17 DIAGNOSIS — Z98.84 S/P LAPAROSCOPIC SLEEVE GASTRECTOMY: ICD-10-CM

## 2023-04-17 DIAGNOSIS — Z86.39 HISTORY OF OBESITY: ICD-10-CM

## 2023-04-17 PROBLEM — F33.9 EPISODE OF RECURRENT MAJOR DEPRESSIVE DISORDER (H): Status: ACTIVE | Noted: 2020-07-24

## 2023-04-17 PROBLEM — F41.9 ANXIETY WITH SOMATIZATION: Status: ACTIVE | Noted: 2018-12-16

## 2023-04-17 PROBLEM — F45.0 ANXIETY WITH SOMATIZATION: Status: ACTIVE | Noted: 2018-12-16

## 2023-04-17 PROBLEM — K76.0 FATTY LIVER: Status: ACTIVE | Noted: 2021-11-16

## 2023-04-17 PROBLEM — R74.8 ELEVATED LIVER ENZYMES: Status: ACTIVE | Noted: 2021-11-16

## 2023-04-17 PROBLEM — J34.2 DEVIATED SEPTUM: Status: ACTIVE | Noted: 2019-10-26

## 2023-04-17 LAB
ALBUMIN SERPL BCG-MCNC: 4.6 G/DL (ref 3.5–5.2)
ALP SERPL-CCNC: 66 U/L (ref 35–104)
ALT SERPL W P-5'-P-CCNC: 11 U/L (ref 10–35)
ANION GAP SERPL CALCULATED.3IONS-SCNC: 9 MMOL/L (ref 7–15)
AST SERPL W P-5'-P-CCNC: 17 U/L (ref 10–35)
BILIRUB SERPL-MCNC: 0.2 MG/DL
BUN SERPL-MCNC: 9.1 MG/DL (ref 6–20)
CALCIUM SERPL-MCNC: 9.6 MG/DL (ref 8.6–10)
CHLORIDE SERPL-SCNC: 103 MMOL/L (ref 98–107)
CHOLEST SERPL-MCNC: 190 MG/DL
CREAT SERPL-MCNC: 0.56 MG/DL (ref 0.51–0.95)
DEPRECATED HCO3 PLAS-SCNC: 26 MMOL/L (ref 22–29)
ERYTHROCYTE [DISTWIDTH] IN BLOOD BY AUTOMATED COUNT: 11.9 % (ref 10–15)
FERRITIN SERPL-MCNC: 9 NG/ML (ref 6–175)
GFR SERPL CREATININE-BSD FRML MDRD: >90 ML/MIN/1.73M2
GLUCOSE SERPL-MCNC: 118 MG/DL (ref 70–99)
HBA1C MFR BLD: 5.3 %
HCT VFR BLD AUTO: 37.6 % (ref 35–47)
HDLC SERPL-MCNC: 78 MG/DL
HGB BLD-MCNC: 12.3 G/DL (ref 11.7–15.7)
LDLC SERPL CALC-MCNC: 98 MG/DL
MCH RBC QN AUTO: 26.3 PG (ref 26.5–33)
MCHC RBC AUTO-ENTMCNC: 32.7 G/DL (ref 31.5–36.5)
MCV RBC AUTO: 80 FL (ref 78–100)
NONHDLC SERPL-MCNC: 112 MG/DL
PLATELET # BLD AUTO: 268 10E3/UL (ref 150–450)
POTASSIUM SERPL-SCNC: 3.9 MMOL/L (ref 3.4–5.3)
PROT SERPL-MCNC: 7.6 G/DL (ref 6.4–8.3)
PTH-INTACT SERPL-MCNC: 104 PG/ML (ref 15–65)
RBC # BLD AUTO: 4.68 10E6/UL (ref 3.8–5.2)
SODIUM SERPL-SCNC: 138 MMOL/L (ref 136–145)
TRIGL SERPL-MCNC: 71 MG/DL
VIT B12 SERPL-MCNC: 967 PG/ML (ref 232–1245)
WBC # BLD AUTO: 5.7 10E3/UL (ref 4–11)

## 2023-04-17 PROCEDURE — 99203 OFFICE O/P NEW LOW 30 MIN: CPT

## 2023-04-17 PROCEDURE — 83970 ASSAY OF PARATHORMONE: CPT | Performed by: PATHOLOGY

## 2023-04-17 PROCEDURE — 82306 VITAMIN D 25 HYDROXY: CPT

## 2023-04-17 PROCEDURE — 99000 SPECIMEN HANDLING OFFICE-LAB: CPT | Performed by: PATHOLOGY

## 2023-04-17 PROCEDURE — 80053 COMPREHEN METABOLIC PANEL: CPT | Performed by: PATHOLOGY

## 2023-04-17 PROCEDURE — 80061 LIPID PANEL: CPT | Performed by: PATHOLOGY

## 2023-04-17 PROCEDURE — 85027 COMPLETE CBC AUTOMATED: CPT | Performed by: PATHOLOGY

## 2023-04-17 PROCEDURE — 82607 VITAMIN B-12: CPT

## 2023-04-17 PROCEDURE — 82728 ASSAY OF FERRITIN: CPT | Performed by: PATHOLOGY

## 2023-04-17 PROCEDURE — 83036 HEMOGLOBIN GLYCOSYLATED A1C: CPT

## 2023-04-17 PROCEDURE — 36415 COLL VENOUS BLD VENIPUNCTURE: CPT | Performed by: PATHOLOGY

## 2023-04-17 PROCEDURE — 84590 ASSAY OF VITAMIN A: CPT | Mod: 90 | Performed by: PATHOLOGY

## 2023-04-17 NOTE — PATIENT INSTRUCTIONS
"Thank you for allowing us the privilege of caring for you. We hope we provided you with the excellent service you deserve.   Please let us know if there is anything else we can do for you so that we can be sure you are completely satisfied with your care experience.    To ensure the quality of our services you may be receiving a patient satisfaction survey from an independent patient satisfaction monitoring company.    The greatest compliment you can give is a \"Likely to Recommend\"    Your visit was with DARIN BENSON PA-C today.    Instructions per today's visit:    Increase water intake daily. Decrease pop and coffee intake.   Start Vitamins   - Flinstone chewable with iron   - Calcium Citrate 600mg twice daily   - B12 500mcg once daily   3. Start Phentermine 15mg one tablet in the evening. Check BP in 1-2 weeks after medication start. Monitor for any side effects of itching.   4. Labs ordered   5. Stool sample for H Pylori to be collected   6. Referral placed for plastic surgery for excess skin removal.   7. Follow up with Darin Randall in 3 months. Will reach out via [employer] in 1 months     _________________________________________________________________________________________________________________________________________________________  Important contact and scheduling information:  Please call our contact center at [phone] to schedule your next appointments.  To find a lab location near you, please call [phone].  For any nursing questions or concerns call Yumiko Branch LPN at [phone] or Kim Eller RN at [phone]  Please call during clinic hours Monday through Friday 8:00a - 4:00p if you have questions or you can contact us via MyChart at anytime and we will reply during clinic hours.    Lab results will be communicated through My Chart or letter (if My Chart not used). Please call the clinic if you have not received communication after 1 week or if you have any questions.?  Clinic Fax: " [phone]  _________________________________________________________________________________________________________________________________________________________  If you are asked by your clinic team to have your blood pressure checked:  [facility] do offer several locations for blood pressure checks. Please follow the below link to schedule an appointment. Scheduling an appointment at the pharmacy for a blood pressure check is now preferred.    Appointment Plus (appointment-[employer])  _________________________________________________________________________________________________________________________________________________________  Meal Replacement Products:    Here is the link to our new e-store where you can purchase our meal replacement products    [facility] E-Store  [email or web address]    The one week starter kit is a great way to sample a variety of products and see what works for you.    If you want more information about the product go to: Fresh Steps Meals  [email or web address]    If you are an employee or [facility] or [facility] please contact your care team for a 10% estore discount    Free Shipping for orders over $75     Benefits of meal replacements products:    Portion and calorie control  Improved nutrition  Structured eating  Simplified food choices  Avoid contact with trigger foods  _________________________________________________________________________________________________________________________________________________________  Interested in working with a health ?  Health coaches work with you to improve your overall health and wellbeing.  They look at the whole person, and may involve discussion of different areas of life, including, but not limited to the four pillars of health (sleep, exercise, nutrition, and stress management). Discuss with your care team if you would like to start working a health .  Health Coaching-3  Pack: Schedule by calling [phone]    $99 for three health coaching visits    Visits may be done in person or via phone    Coaching is a partnership between the  and the client; Coaches do not prescribe or diagnose    Coaching helps inspire the client to reach his/her personal goals   _________________________________________________________________________________________________________________________________________________________  24 Week Healthy Lifestyle Plan:    Our mission in the 24-week Healthy Lifestyle Plan is to provide you with individualized care by giving you the tools, education and support you need to lose weight and maintain a healthy lifestyle. In your 24-week journey, you ll be supported by a dedicated weight loss team that includes registered dietitians, medical weight management providers, health coaches, and nurses -- all with special expertise in weight loss -- to help you every step of the way.     Monthly meetings with your registered dietician or medical weight management provider help to review your progress, update your care plan, and make any adjustments needed to ensure success. Between these visits, weekly and bi-weekly health  visits will help you focus on the four pillars of weight loss -- stress, sleep, nutrition, and exercise -- and how you can best adapt each to achieve sustainable weight loss results.    In addition, you will be given exclusive access to online wellbeing classes through [employer].  Your initial visit will be with a medical weight management provider who will help to understand your weight loss goals and ensure this program is the right fit for you. Please let our team know if you are interested in the 24 week plan by sending a message to your care team or calling [phone] to  "schedule.  _________________________________________________________________________________________________________________________________________________________    COMPREHENSIVE WEIGHT MANAGEMENT PROGRAM  VIRTUAL SUPPORT GROUPS    For Support Group Information:      We offer support groups for patients who are working on weight loss and considering, preparing for or have had weight loss surgery.   There is no cost for this opportunity.  You are invited to attend the?Virtual Support Groups?provided by any of the following locations:    [facility] via Microsoft Teams with Taylor Mcqueen RN  2.   [place] via GridX with Geovanni Doran, PhD, LP  3.   [place] via GridX with Zuleyma Alonso RN  4.   [facility] via Cloud[employer] Teams with Zuleyma Mayberry [facility]    The following Support Group information can also be found on our website:  [email or web address]    [facility] Weight Loss Surgery Support Group    [facility] Weight Loss Surgery Support Group  The support group is a patient-lead forum that meets monthly to share experiences, encouragement and education. It is open to those who have had weight loss surgery, are scheduled for surgery, and those who are considering surgery.   WHEN: This group meets on the 3rd Wednesday of each month from 5:00PM - 6:00PM virtually using Microsoft Teams.   FACILITATOR: Led by Taylor Mcqueen, YONATAN, LD, RN, the program's Clinical Coordinator.   TO REGISTER: Please contact the clinic via [employer] or call the nurse line directly at [phone] to inform our staff that you would like an invite sent to you and to let us know the email you would like the invite sent to. Prior to the meeting, a link with directions on how to join the meeting will be sent to you.    2022 Meetings  Michelle 15: \"Let's Talk\" a time for the group to share.  July 20: \"Let's Talk\" a time for the group to " "share.  August 17: \"Let's Talk\" a time for the group to share.  September 21: \"Let's Talk\" a time for the group to share.  October 19: Guest Speaker: Dr Fortino Anton MD Pulmonologist and Sleep Medicine Physician, \"Getting a Good Night's Sleep\".  November 16: \"Let's Talk\" a time for the group to share.  December 21: \"Let's Talk\" a time for the group to share.    [facility] Support Groups    Connections: Bariatric Care Support Group?  This is open to all [facility] (and those external to this program) pre- and post- operative bariatric surgery patients as well as their support system.   WHEN: This group meets the 2nd Tuesday of each month from 6:30 PM - 8:00 PM virtually using Microsoft Teams.   FACILITATOR: Led by Geovanni Doran, Ph.D who is a Licensed Psychologist with the [facility] Comprehensive Weight Management Program.   TO REGISTER: Please send an email to Geovanni Doran, Ph.D., LP at?[email or web address]?if you would like an invitation to the group and to learn about using Microsoft Teams.    2022 Meetings  June 14: Giovanna Ortega RD, LD at [facility], \"Nutritional Labeling\"  July 12 August 2 (Please Note Date Change)  September 13 October 11 November 8 December 13    Connections: Post-Operative Bariatric Surgery Support Group  This is a support group for [facility] bariatric patients (and those external to [facility]) who have had bariatric surgery and are at least 3 months post-surgery.  WHEN: This support group meets the 4th Wednesday of the month from 11:00 AM - 12:00 PM virtually using Microsoft Teams.   FACILITATOR: Led by Certified Bariatric Nurse, Zuleyma Alonso RN.   TO REGISTER: Please send an email to Zuleyma at [email or web address] if you would like an invitation to the group and to learn about using Microsoft Teams.    2022 Meetings June 22 July 27 August 24 September 28 October 26 November 23 December " "28      [facility] Healthy Lifestyle Virtual Support Group    Healthy Lifestyle Virtual Support Group?  This is 60 minutes of small group guided discussion, support and resources. All are welcome who want a healthy lifestyle.  WHEN: This group meets monthly on a Friday from 12:30 PM - 1:30 PM virtually using Microsoft Teams.   FACILITATOR: Led by National Board Certified Health and , Zuleyma Mayberry [facility].   TO REGISTER: Please send an email to Zuleyma [email or web address] to receive monthly invites to the group or if you have any questions about having a health .  Prior to the meeting, a link with directions on how to join the meeting will be sent to you.    2022 Meetings  June 24: Zuleyma Mayberry [facility], \"Setting Limits and Boundaries\".  Jul 29: Open Forum  August 26: Guest Speaker: Joyce Webster Registered Dietitian  September 30: Open Forum  October 28th: Guest Speaker: Zari Brownlee [facility], Health , \"Gratitude Practices\".  November 18: Guest Speaker: Obdulia Richardson RD Registered Dietitian, \"Navigating How to Eat around the Holidays\".  December 16: Guest Speaker: Glenys Fox [facility], \"Changing Your Relationship with Movement\".    ____________________________________________________________________________________________________________________________________________________________________________  [place] [facility] Get Moving Program  Our team of physical therapists is trained to help you understand and take control of your condition. They will perform a thorough evaluation to determine your ability for activity and develop a customized plan to fit your goals and physical ability.  Scheduling: Unsure if the Get Moving program is right for you? Discuss the program with your medical provider or diabetes educator. You can also call us at [phone] to ask questions or schedule an appointment.   [facility] Get Moving " Program  ____________________________________________________________________________________________________________________________________________________________________________  [facility] Diabetes Prevention Program (DPP)  If you have prediabetes and Medicare please contact us via [employer] to learn more about the Diabetes Prevention Program (DPP)  Program Details:  [facility] offers the year-long Diabetes Prevention Program (DPP). The program helps you to make lifestyle changes that prevent or delay type 2 diabetes by supporting healthy eating, increased physical activity, stress reduction and use of coping skills.   On average, previous [facility] DPP cohorts have lost and maintained at least 5% of their starting weight throughout the program and averaged more than 150 minutes of physical activity per week.  Participants meet weekly for one-hour group sessions over sixteen weeks, every other week for the next 8 weeks, and monthly for the last six months.   A year-long maintenance program is also available for participants who complete the first year.   Location & Cost:   During the COVID-19 Public Health Emergency, the program is offered virtually. When in-person classes can resume, they will be held at [facility].  For people with Medicare, the program is covered in full. A self-pay option will also be available for those with non-Medicare insurance plans.   _________________________________________________________________________________________________________________________________________________________  Bluetooth Scale:    We hope to provide you with high quality virtual healthcare visits while social distancing for COVID-19 is necessary, as well as in the future when virtual visits may be more convenient for you.     Our technology team made it possible for Bluetooth scales to send weight measurements to our electronic medical record. This allows  weights from you weighing at home to securely flow into the medical record, which will improve telephone and virtual visits.   Additionally, studies have shown that adults actually lose more weight when their weights are automatically sent to someone else, and also that this process is not stressful for those adults.    Below is a link for purchasing the scale, with a discount code for our patients. You may call your insurance company to see if they will reimburse you for the cost of the scale, as a piece of durable medical equipment. The scales only go up to a weight of 400 pounds. This is an issue and we are working with the developer on increasing this. We found no scales that go over 400lb that have blue-tooth for connecting to [employer].    Scale to purchase: the [employer]  Body  Scale: [email or web address].ds    Discount Code: We have a discount code for our patients to bring the cost down to $50, Discount code is: [place]_Scale_20%off  _______________________________________________________________________________________________________________________________________________________________________________    To work with a Behavioral Health Psychologist:    Call to schedule:    Max Lin - [phone]  Kalli Dean - [phone]  Annelise Hodge - [phone]  Emy Alonso - [phone]   Alix Sykes PhD (cannot accept Medicare) [phone]        Thank you,   [facility] Comprehensive Weight Management Team    PHENTERMINE    We are considering starting Phentermine. Take one tablet in the morning.      Phentermine is being prescribed because you identified hunger as one of the main causes for your extra weight.      Our patients on Phentermine find that they:    >feel less hunger    >find it easier to push the plate away   >have an easier time eating less    For some of our patients, these  feelings are very real and immediate. For other patients, the feelings are less obvious. They don't feel much of a change but find they've lost weight. Like all weight loss medications, Phentermine  works best when you help it work. This means:  1. Having less tempting high calorie (fattening) food around the house or office. (For people with strong cravings this is very important.)   2. Staying away from situations or people that may trigger your cravings .   3. Eating out only one time or less each week.  4. Eating your meals at a table with the TV or computer off.    Side-effects. Phentermine is generally well tolerated. The main side-effects we see are feelings of racing pulse or rapid heart beat. Some people can get an elevated blood pressure. Because of this we may have you come back within a week or so of starting the medication for a blood pressure check.        For any questions or concerns please send a Rover message to our team or call our weight management call center at [phone] during regular business hours. For questions during evenings or weekends your messages will be addressed during the next business day.  For emergencies please call 911 or seek immediate medical care.      In order to get refills of this or any medication we prescribe you must be seen in the medical weight mgmt clinic every 2-3 months. Please have your pharmacy fax a refill request to [phone].

## 2023-04-17 NOTE — PROGRESS NOTES
"Return Bariatric Surgery Note    RE: Karina Rice  MR#: [id number]  : 1985  VISIT DATE: 2023      Dear Clinic, [facility],    I had the pleasure of seeing your patient, Karina Rice, in my post-bariatric surgery assessment clinic.    Assessment & Plan   Problem List Items Addressed This Visit        Other    S/P laparoscopic sleeve gastrectomy     2 years s/p gastric sleeve with Dr. Keenan. Last seen 1 month post op. Has not had any concerns since last seen.     [place] weight - 138lbs.   Weight today - 151lbs     No nausea, vomiting, constipation, diarrhea, or dysphagia.     GERD - has improved since surgery. Only have symptoms 2xmonth that are well tolerated. This has increased with some weight regain.     No concerns for dumping syndrome or hypoglycemia. No ETOH, nicotine. NSAIDs rarely         Relevant Orders    Adult Plastic Surgery  Referral    CBC with platelets (Completed)    Comprehensive metabolic panel (Completed)    Hemoglobin A1c (Completed)    Ferritin (Completed)    Lipid panel reflex to direct LDL Fasting (Completed)    Parathyroid Hormone Intact (Completed)    Vitamin A    Vitamin B12 (Completed)    Vitamin D Deficiency (Completed)    Helicobacter pylori Antigen Stool    Overweight (BMI 25.0-29.9) - Primary     [place] weight of 138lbs, and was able to maintain weight for 1 years. Weight regain has been gradual due to increase hunger. Feels like she is hungry \"every 10 minutes\".    Will start Phentermine today to help with hunger. Anxiety is well controlled without medications. She will monitor for any increase symptoms. Previous reaction of oxymetazoline with itching, will monitor. No hx of HTN. No contraindications. Discussed side effects, risks, and benefits.               Relevant Medications    phentermine (ADIPEX-P) 15 MG capsule   Other Visit Diagnoses     Excess skin of abdomen        Relevant Orders    Adult Plastic Surgery  Referral    " "History of obesity        Relevant Medications    phentermine (ADIPEX-P) 15 MG capsule         1. Increase water intake daily. Decrease pop and coffee intake.   2. Start Vitamins   - Flinstone chewable with iron   - Calcium Citrate 600mg twice daily   - B12 500mcg once daily   3. Start Phentermine 15mg one tablet in the evening. Check BP in 1-2 weeks after medication start. Monitor for any side effects of itching.   4. Labs ordered   5. Stool sample for H Pylori to be collected   6. Referral placed for plastic surgery for excess skin removal.   7. Follow up with Angela Randall in 3 months. Will reach out via [email or web address] in 1 months     40 minutes spent by me on the date of the encounter doing chart review, history and exam, documentation and further activities per the note    CHIEF COMPLAINT: Post-bariatric surgery follow-up. 2 years s/p gastric sleeve with Dr. Keenan.    HISTORY OF PRESENT ILLNESS:      5/18/2021     1:08 PM   Questions Regarding Prior Weight Loss Surgery Reviewed With Patient   I had the following weight loss procedure Sleeve Gastrectomy   What year was your surgery? 2021   How has your weight changed since your last visit? I have lost weight   Are you currently taking any weight loss medications? No   Do you currently have any of the following None of the above   ER since last visit No   Hospital since last visit No   Do you have any concerns today? none     Last year reached lowest weight to 138lbs. And was able to maintain for around a year. In the past 6 months has gained around 10lbs. Weight regain has been gradual.     Recent diet changes:  Eating 2 meals a day, with continual snacking. Portion size around 1 cup. Is struggling increase hunger, \"hungry every 10min\". Tolerating all foods without concern.     -Protein? Protein focused.   -Water? Only drinks coffee and coke zero.     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 "      Recent stressors: No concerns     Recent sleep changes: Stable     Vitamins/Labs: Not taking any vitamins     Excess skin - will have rashes underneath panus, with puritis that leaves excoriations. Worse in the summer. Causes pain when compressed by work pants, leading to increase back pain.     No nausea, vomiting, constipation, diarrhea, or dysphagia.     GERD - has improved since surgery. Only have symptoms 2xmonth that are well tolerated. This has increased with some weight regain.     No concerns for dumping syndrome or hypoglycemia.     No ETOH, nicotine.     NSAIDs rarely     Depression, Anxiety, PTSD - well controlled without medications.     Anti-obesity medication ROS:    HEENT  Hx of glaucoma: No    Cardiovascular  CAD:No  HTN:No    Gastrointestinal  GERD:Yes  Constipation:No  Liver Dz:No  H/O Pancreatitis:No    Psychiatric  Bipolar: No  Anxiety:No  Depression:No  History of alcohol/drug abuse: No  Hx of eating disorder:No    Endocrine  Personal or family hx of MTC or MEN2: Unsure, sister had thyroid cancer  Diabetes/prediabetes: No    Neurologic:  Hx of seizures: No  Hx of migraines: No  Memory Impairment: No      History of kidney stones: No  Kidney disease: No  Current birth control: No        Weight History:      5/18/2021     1:08 PM   --   What is your highest lifetime weight? 240   What is your lowest weight since surgery? (In pounds) 210        Weight: 68.5 kg (151 lb 1.6 oz)                 5/18/2021     1:08 PM   Questions Regarding Co-Morbidities and Health Concerns Reviewed With Patient   Pre-diabetes Never   Diabetes II Never   High Blood Pressure Never   High cholesterol Never   Heartburn/Reflux Never   Sleep apnea Never   PCOS Never   Back pain Stayed the same   Joint pain Stayed the same   Lower leg swelling Improved           5/18/2021     1:08 PM   Eating Habits   How many meals do you eat per day? 3   Do you snack between meals? No   How much food are you eating at each meal?  "Less than 1/2 cup   Are you able to separate your meals and liquids by at least 30 minutes? No   Are you able to avoid liquid calories? Yes           5/18/2021     1:08 PM   Exercise Questions Reviewed With Patient   How often do you exercise? Never   What keeps you from being more active? Pain       Social History:      5/18/2021     1:08 PM   --   Are you smoking? No   Are you drinking alcohol? No       Medications:  Current Outpatient Medications   Medication     phentermine (ADIPEX-P) 15 MG capsule     No current facility-administered medications for this visit.         5/18/2021     1:08 PM   --   Do you avoid NSAIDs such as (Ibuprofen, Aleve, Naproxen, Advil)?   No       ROS:  GI:       5/18/2021     1:08 PM   --   Vomiting: Yes   Diarrhea: No   Constipation: Yes   Swallowing trouble: No   Abdominal pain: No   Heartburn: No   Rash in skin folds: No   Depression: No   Stress urinary incontinence No     Skin:       5/18/2021     1:08 PM   BAR RBS ROS - SKIN   Rash in skin folds: No     Psych:       5/18/2021     1:08 PM   --   Depression: No   Anxiety: No     Female Only:       5/18/2021     1:08 PM   BAR RBS ROS - FEMALE ONLY   Female only Irregular menstrual cycles         PHYSICAL EXAM:  Objective    /73 (BP Location: Left arm, Patient Position: Sitting, Cuff Size: Adult Regular)   Pulse 89   Ht 1.6 m (5' 3\")   Wt 68.5 kg (151 lb 1.6 oz)   SpO2 100%   BMI 26.77 kg/m    Physical Exam   GENERAL: Healthy, alert and no distress  EYES: Eyes grossly normal to inspection.  No discharge or erythema, or obvious scleral/conjunctival abnormalities.  RESP: No audible wheeze, cough, or visible cyanosis.  No visible retractions or increased work of breathing.    SKIN: Visible skin clear. No significant rash, abnormal pigmentation or lesions.  NEURO: Cranial nerves grossly intact.  Mentation and speech appropriate for age.  PSYCH: Mentation appears normal, affect normal/bright, judgement and insight intact, " normal speech and appearance well-groomed.          Sincerely,    DARIN BENSON PA-C

## 2023-04-17 NOTE — LETTER
"2023       RE: Karina Rice  [address]  [place] [address]     Dear Colleague,    Thank you for referring your patient, Karina Rice, to the [facility] [place] [facility]. Please see a copy of my visit note below.    Return Bariatric Surgery Note    RE: Karina Rice  MR#: [id number]  : 1985  VISIT DATE: 2023      Dear Clinic, [facility],    I had the pleasure of seeing your patient, Karina Rice, in my post-bariatric surgery assessment clinic.    Assessment & Plan   Problem List Items Addressed This Visit          Other    S/P laparoscopic sleeve gastrectomy     2 years s/p gastric sleeve with Dr. Keenan. Last seen 1 month post op. Has not had any concerns since last seen.     [place] weight - 138lbs.   Weight today - 151lbs     No nausea, vomiting, constipation, diarrhea, or dysphagia.     GERD - has improved since surgery. Only have symptoms 2xmonth that are well tolerated. This has increased with some weight regain.     No concerns for dumping syndrome or hypoglycemia. No ETOH, nicotine. NSAIDs rarely         Relevant Orders    Adult Plastic Surgery  Referral    CBC with platelets (Completed)    Comprehensive metabolic panel (Completed)    Hemoglobin A1c (Completed)    Ferritin (Completed)    Lipid panel reflex to direct LDL Fasting (Completed)    Parathyroid Hormone Intact (Completed)    Vitamin A    Vitamin B12 (Completed)    Vitamin D Deficiency (Completed)    Helicobacter pylori Antigen Stool    Overweight (BMI 25.0-29.9) - Primary     [place] weight of 138lbs, and was able to maintain weight for 1 years. Weight regain has been gradual due to increase hunger. Feels like she is hungry \"every 10 minutes\".    Will start Phentermine today to help with hunger. Anxiety is well controlled without medications. She will monitor for any increase symptoms. Previous " reaction of oxymetazoline with itching, will monitor. No hx of HTN. No contraindications. Discussed side effects, risks, and benefits.               Relevant Medications    phentermine (ADIPEX-P) 15 MG capsule     Other Visit Diagnoses       Excess skin of abdomen        Relevant Orders    Adult Plastic Surgery  Referral    History of obesity        Relevant Medications    phentermine (ADIPEX-P) 15 MG capsule           Increase water intake daily. Decrease pop and coffee intake.   Start Vitamins   - Flinstone chewable with iron   - Calcium Citrate 600mg twice daily   - B12 500mcg once daily   3. Start Phentermine 15mg one tablet in the evening. Check BP in 1-2 weeks after medication start. Monitor for any side effects of itching.   4. Labs ordered   5. Stool sample for H Pylori to be collected   6. Referral placed for plastic surgery for excess skin removal.   7. Follow up with Angela Randall in 3 months. Will reach out via [email or web address] in 1 months     40 minutes spent by me on the date of the encounter doing chart review, history and exam, documentation and further activities per the note    CHIEF COMPLAINT: Post-bariatric surgery follow-up. 2 years s/p gastric sleeve with Dr. Keenan.    HISTORY OF PRESENT ILLNESS:      5/18/2021     1:08 PM   Questions Regarding Prior Weight Loss Surgery Reviewed With Patient   I had the following weight loss procedure Sleeve Gastrectomy   What year was your surgery? 2021   How has your weight changed since your last visit? I have lost weight   Are you currently taking any weight loss medications? No   Do you currently have any of the following None of the above   ER since last visit No   Hospital since last visit No   Do you have any concerns today? none     Last year reached lowest weight to 138lbs. And was able to maintain for around a year. In the past 6 months has gained around 10lbs. Weight regain has been gradual.     Recent diet changes:  Eating 2 meals a day, with  "continual snacking. Portion size around 1 cup. Is struggling increase hunger, \"hungry every 10min\". Tolerating all foods without concern.     -Protein? Protein focused.   -Water? Only drinks coffee and coke zero.     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      Recent stressors: No concerns     Recent sleep changes: Stable     Vitamins/Labs: Not taking any vitamins     Excess skin - will have rashes underneath panus, with puritis that leaves excoriations. Worse in the summer. Causes pain when compressed by work pants, leading to increase back pain.     No nausea, vomiting, constipation, diarrhea, or dysphagia.     GERD - has improved since surgery. Only have symptoms 2xmonth that are well tolerated. This has increased with some weight regain.     No concerns for dumping syndrome or hypoglycemia.     No ETOH, nicotine.     NSAIDs rarely     Depression, Anxiety, PTSD - well controlled without medications.     Anti-obesity medication ROS:    HEENT  Hx of glaucoma: No    Cardiovascular  CAD:No  HTN:No    Gastrointestinal  GERD:Yes  Constipation:No  Liver Dz:No  H/O Pancreatitis:No    Psychiatric  Bipolar: No  Anxiety:No  Depression:No  History of alcohol/drug abuse: No  Hx of eating disorder:No    Endocrine  Personal or family hx of MTC or MEN2: Unsure, sister had thyroid cancer  Diabetes/prediabetes: No    Neurologic:  Hx of seizures: No  Hx of migraines: No  Memory Impairment: No      History of kidney stones: No  Kidney disease: No  Current birth control: No        Weight History:      5/18/2021     1:08 PM   --   What is your highest lifetime weight? 240   What is your lowest weight since surgery? (In pounds) 210        Weight: 68.5 kg (151 lb 1.6 oz)                 5/18/2021     1:08 PM   Questions Regarding Co-Morbidities and Health Concerns Reviewed With Patient   Pre-diabetes Never   Diabetes II Never   High Blood Pressure " "Never   High cholesterol Never   Heartburn/Reflux Never   Sleep apnea Never   PCOS Never   Back pain Stayed the same   Joint pain Stayed the same   Lower leg swelling Improved           5/18/2021     1:08 PM   Eating Habits   How many meals do you eat per day? 3   Do you snack between meals? No   How much food are you eating at each meal? Less than 1/2 cup   Are you able to separate your meals and liquids by at least 30 minutes? No   Are you able to avoid liquid calories? Yes           5/18/2021     1:08 PM   Exercise Questions Reviewed With Patient   How often do you exercise? Never   What keeps you from being more active? Pain       Social History:      5/18/2021     1:08 PM   --   Are you smoking? No   Are you drinking alcohol? No       Medications:  Current Outpatient Medications   Medication    phentermine (ADIPEX-P) 15 MG capsule     No current facility-administered medications for this visit.         5/18/2021     1:08 PM   --   Do you avoid NSAIDs such as (Ibuprofen, Aleve, Naproxen, Advil)?   No       ROS:  GI:       5/18/2021     1:08 PM   --   Vomiting: Yes   Diarrhea: No   Constipation: Yes   Swallowing trouble: No   Abdominal pain: No   Heartburn: No   Rash in skin folds: No   Depression: No   Stress urinary incontinence No     Skin:       5/18/2021     1:08 PM   BAR RBS ROS - SKIN   Rash in skin folds: No     Psych:       5/18/2021     1:08 PM   --   Depression: No   Anxiety: No     Female Only:       5/18/2021     1:08 PM   BAR RBS ROS - FEMALE ONLY   Female only Irregular menstrual cycles         PHYSICAL EXAM:  Objective    /73 (BP Location: Left arm, Patient Position: Sitting, Cuff Size: Adult Regular)   Pulse 89   Ht 1.6 m (5' 3\")   Wt 68.5 kg (151 lb 1.6 oz)   SpO2 100%   BMI 26.77 kg/m    Physical Exam   GENERAL: Healthy, alert and no distress  EYES: Eyes grossly normal to inspection.  No discharge or erythema, or obvious scleral/conjunctival abnormalities.  RESP: No audible wheeze, " cough, or visible cyanosis.  No visible retractions or increased work of breathing.    SKIN: Visible skin clear. No significant rash, abnormal pigmentation or lesions.  NEURO: Cranial nerves grossly intact.  Mentation and speech appropriate for age.  PSYCH: Mentation appears normal, affect normal/bright, judgement and insight intact, normal speech and appearance well-groomed.          Sincerely,    DARIN BENSON PA-C

## 2023-04-17 NOTE — NURSING NOTE
"(   Chief Complaint   Patient presents with     RECHECK     Return [facility]    )    ( Weight: 68.5 kg (151 lb 1.6 oz) )  ( Height: 160 cm (5' 3\") )  ( BMI (Calculated): 26.77 )  (   )  (   )  (   )  (   )  (   )  (   )    ( BP: 123/73 )  (   )  (   )  (   )  ( Pulse: 89 )  (   )  ( SpO2: 100 % )    (   Patient Active Problem List   Diagnosis     Morbid obesity (H)     S/P laparoscopic sleeve gastrectomy    )  (   Current Outpatient Medications   Medication Sig Dispense Refill     albuterol (PROVENTIL) (2.5 MG/3ML) 0.083% neb solution NEBULIZE 1 VIAL EVERY 4 (FOUR) HOURS AS NEEDED. (Patient not taking: Reported on 4/17/2023)       omeprazole (PRILOSEC) 20 MG DR capsule Take 1 capsule (20 mg) by mouth every morning (before breakfast) (Patient not taking: Reported on 4/17/2023) 30 capsule 3     senna-docusate (SENOKOT-S/PERICOLACE) 8.6-50 MG tablet Take 2 tablets by mouth 2 times daily (Patient not taking: Reported on 6/15/2021) 30 tablet 0     ursodiol (ACTIGALL) 300 MG capsule Take 1 capsule (300 mg) by mouth 2 times daily (Patient not taking: Reported on 4/17/2023) 60 capsule 5    )  ( Diabetes Eval:    )    ( Pain Eval:  Data Unavailable )    ( Wound Eval:       )    (   History   Smoking Status     Never   Smokeless Tobacco     Never    )    ( Signed By:  Usman Zarco, EMT; April 17, 2023; 1:13 PM )    "

## 2023-04-18 PROBLEM — E66.3 OVERWEIGHT (BMI 25.0-29.9): Status: ACTIVE | Noted: 2023-04-18

## 2023-04-18 LAB — DEPRECATED CALCIDIOL+CALCIFEROL SERPL-MC: 21 UG/L (ref 20–75)

## 2023-04-18 NOTE — TELEPHONE ENCOUNTER
FUTURE VISIT INFORMATION      FUTURE VISIT INFORMATION:    Date: 5/1/2023    Time: 2:20 PM    [place] [facility]  REFERRAL INFORMATION:    Referring provider: TURNER Long    Referring providers clinic: [facility] - [facility] Management    Reason for visit/diagnosis: Excess Skin of Abdomen    RECORDS REQUESTED FROM:       Clinic name Comments Records Status Imaging Status   ealth 4/17/23 - WEIGHT OV with TURNER Long  7/13/21 - GI OV with Gabriella Luna RD  6/15/21 - WEIGHT OV with BERNARDINO Michaels    [facility] - Procedure 5/11/21 - OP Note for LAPAROSCOPIC SLEEVE GASTRECTOMY with Dr. Ree Whitley 4/6/23 - PCC OV with Dr. Mirian Jeffrey Everywhere

## 2023-04-18 NOTE — ASSESSMENT & PLAN NOTE
"[place] weight of 138lbs, and was able to maintain weight for 1 years. Weight regain has been gradual due to increase hunger. Feels like she is hungry \"every 10 minutes\".    Will start Phentermine today to help with hunger. Anxiety is well controlled without medications. She will monitor for any increase symptoms. Previous reaction of oxymetazoline with itching, will monitor. No hx of HTN. No contraindications. Discussed side effects, risks, and benefits.        "

## 2023-04-18 NOTE — ASSESSMENT & PLAN NOTE
2 years s/p gastric sleeve with Dr. Keenan. Last seen 1 month post op. Has not had any concerns since last seen.     [place] weight - 138lbs.   Weight today - 151lbs     No nausea, vomiting, constipation, diarrhea, or dysphagia.     GERD - has improved since surgery. Only have symptoms 2xmonth that are well tolerated. This has increased with some weight regain.     No concerns for dumping syndrome or hypoglycemia. No ETOH, nicotine. NSAIDs rarely   - L>R. B/l ankle x rays with no obvious findings. Patient sometimes reports pain and sometimes not. Also reports twisted ankle.  - Wells for DVT 1. D Dimer wnl. Low concern for DVT

## 2023-04-19 LAB
ANNOTATION COMMENT IMP: NORMAL
RETINYL PALMITATE SERPL-MCNC: 0.06 MG/L
VIT A SERPL-MCNC: 0.37 MG/L

## 2023-04-19 RX ORDER — PHENTERMINE HYDROCHLORIDE 15 MG/1
15 CAPSULE ORAL EVERY MORNING
Qty: 30 CAPSULE | Refills: 2 | Status: SHIPPED | OUTPATIENT
Start: 2023-04-19 | End: 2023-11-13

## 2023-04-20 ENCOUNTER — TELEPHONE (OUTPATIENT)
Dept: ENDOCRINOLOGY | Facility: CLINIC | Age: 38
End: 2023-04-20
Payer: MEDICAID

## 2023-04-20 ENCOUNTER — TELEPHONE (OUTPATIENT)
Dept: ENDOCRINOLOGY | Facility: CLINIC | Age: 38
End: 2023-04-20

## 2023-04-20 NOTE — TELEPHONE ENCOUNTER
PRIOR AUTHORIZATION DENIED    Medication: phentermine (ADIPEX-P) 15 MG capsule - EPA DENIED    Denial Date: 4/20/2023    Denial Rational:       Appeal Information:

## 2023-04-20 NOTE — TELEPHONE ENCOUNTER
Patient called regarding her medication, she went to the pharmacy & they haven't received her prescription.  Please send to the [facility] in [place].  phentermine  phentermine (ADIPEX-P) 15 MG capsule  Take 1 capsule (15 mg) by mouth every morning, Disp-30 capsule, R-2, E-Prescribe   Dispense: 30 capsule   Refills: 2 ordered   Pharmacy: [id number] - [address] (Ph: [phone])   Released to pharmacy: No       [phone] okay to leave VM

## 2023-04-24 NOTE — TELEPHONE ENCOUNTER
Patient is aware her PA for phentermine was denied. She would like the prescription released and to pay cash.

## 2023-05-01 ENCOUNTER — PRE VISIT (OUTPATIENT)
Dept: PLASTIC SURGERY | Facility: CLINIC | Age: 38
End: 2023-05-01

## 2023-05-12 ENCOUNTER — PATIENT OUTREACH (OUTPATIENT)
Dept: ENDOCRINOLOGY | Facility: CLINIC | Age: 38
End: 2023-05-12
Payer: MEDICAID

## 2023-05-12 NOTE — PROGRESS NOTES
Attempted to contact patient regarding lab results and instructions.  Left message.      Glucose is elevated - this is due to non-fasting labs and is the normal range after a meal.     Vitamin D is low normal - please start vitamin D 2000IU daily. We will recheck this lab in 2 months.     Parathyroid is elevated - this is most likely due to low vitamin D levels and should improve with supplementation. We will recheck this lab in 2 months.     CBC is stable from previous labs.     Vitamin A, B12, A1C, ferritin, and cholesterol are normal.

## 2023-05-16 ENCOUNTER — OFFICE VISIT (OUTPATIENT)
Dept: SURGERY | Facility: CLINIC | Age: 38
End: 2023-05-16
Payer: COMMERCIAL

## 2023-05-16 DIAGNOSIS — E65 ABDOMINAL PANNUS: ICD-10-CM

## 2023-05-16 DIAGNOSIS — M79.3 PANNICULITIS: Primary | ICD-10-CM

## 2023-05-16 DIAGNOSIS — Z98.84 S/P LAPAROSCOPIC SLEEVE GASTRECTOMY: ICD-10-CM

## 2023-05-16 PROCEDURE — 99203 OFFICE O/P NEW LOW 30 MIN: CPT | Performed by: PHYSICIAN ASSISTANT

## 2023-05-16 NOTE — NURSING NOTE
Karina Rice's goals for this visit include:   Chief Complaint   Patient presents with     Consult     Excess skin of abdomen       She requests these members of her care team be copied on today's visit information: no    PCP: [facility]    Referring Provider:  No referring provider defined for this encounter.    There were no vitals taken for this visit.    Do you need any medication refills at today's visit? No    Tarsha Manzano LPN

## 2023-05-16 NOTE — LETTER
2023         RE: Karina Rice  [address]  [facility] [address]        Dear Colleague,    Thank you for referring your patient, Karina Rice, to the [facility]. Please see a copy of my visit note below.    Plastic and Reconstructive Surgery Note  2023    HPI:  Karina is a pleasant and otherwise healthy 38 year old female who presents s/p massive weight loss. She has lost 120lbs. Her high weight was 370lbs. Her current low weight is 150lbs. She has been at this weight for at least 1 year.   She would like to lose 10 more pounds, but this is not something that she is actively doing.   She had laparoscopic gastric sleeve surgery in . She is using phentermine.     Following her massive weight loss, she has excess skin to her abdomen. This skin impairs her ability to continue her exercise. Her excess skin interferes with her ability to find clothes that fit properly and it causes negative self esteem.     Due to her extra skin, she is prone to skin issues. She treats these with topical powders and creams. She has increased odor and itching to her skin that causes skin breakdown and open wounds. This becomes much worse in warmer months due to the heat, sweating and friction of her extra skin. She has treated these noah for at least 6 months, without resolution. She will have improvement to the areas in cooler months, but will always have a return of symptoms.     She is not diabetic. She does not have a history of hypertrophic or keloid scarring. She does not have any abdominal bulges or hernias. She has had prior laparoscopic surgery to her abdomen. She had 1 prior .     Medications:  Current Outpatient Medications   Medication     phentermine (ADIPEX-P) 15 MG capsule     No current facility-administered medications for this visit.       Allergies:     Allergies   Allergen Reactions     Compazine [Prochlorperazine] Difficulty breathing     Toradol  [Ketorolac] Difficulty breathing     Patient had IV Toradol in ED and states she passed out.  Had difficulty breathing when she woke up.     Oxymetazoline Itching     No antibiotic allergies.     Medical History:   No past medical history on file.  Hx morbid obesity now s/p massive weight loss   Anxiety   Depression     Surgical History:  Past Surgical History:   Procedure Laterality Date     LAPAROSCOPIC GASTRIC SLEEVE N/A 2021    Procedure: GASTRECTOMY, SLEEVE, LAPAROSCOPIC;  Surgeon: Augusto Keenan MD;  Location:  OR       Family History:  No family history of bleeding or clotting problems or problems with anesthesia.       Social History:  Social History     Tobacco Use     Smoking status: Never     Smokeless tobacco: Never     No tobacco or nicotine use     ROS:  +excess abdominal skin  +skin rashes, irritation, panniculitis   +difficulty with breathing through nose    Physical Exam:   Vitals: There were no vitals taken for this visit.  Gen: well appearing, NAD   Abd: abdomen without any palpable bulge or hernia.   Redundant tissue that hands over pubic symphysis  Irritation and redness to skin under pannus, with healing boil and carbuncles present to folds and mons area due to skin folds  Redness, odor and irritation at umbilicus due to abdominal folds without drainage  Prior scar from laparoscopic port well healed, slightly raised and pale without tenderness   Prior  low transverse incision well healed without hypertrophy, but there is tenderness and firmness to  scar   Scaling, redness and skin irritation noted in photographs at lateral aspect of abdominal fold due to skin pannus causing panniculitis     Assessment:   38 year old female s/p 120lb massive weight loss now with abdominal pannus and panniculitis       Plan:  We will submit for PA for panniculectomy for medical necessity due to abdominal pannus s/p massive weight loss, panniculitis   Following approval, we will  have patient meet with Dr. Galarza for pre-surgery consultation   At this time, we will not seek approval for abdominoplasty as patient is unsure if she desires future pregnancies.   During follow up with Dr. Galarza, patient can also discuss her concerns over her prior rhinoplasty, as she has collapse of her left nostril with breathing and this causes her difficulty with breathing      Again, thank you for allowing me to participate in the care of your patient.        Sincerely,        Linda Stewart PA-C

## 2023-05-21 NOTE — PROGRESS NOTES
Plastic and Reconstructive Surgery Note  2023    HPI:  Karina is a pleasant and otherwise healthy 38 year old female who presents s/p massive weight loss. She has lost 120lbs. Her high weight was 370lbs. Her current low weight is 150lbs. She has been at this weight for at least 1 year.   She would like to lose 10 more pounds, but this is not something that she is actively doing.   She had laparoscopic gastric sleeve surgery in . She is using phentermine.     Following her massive weight loss, she has excess skin to her abdomen. This skin impairs her ability to continue her exercise. Her excess skin interferes with her ability to find clothes that fit properly and it causes negative self esteem.     Due to her extra skin, she is prone to skin issues. She treats these with topical powders and creams. She has increased odor and itching to her skin that causes skin breakdown and open wounds. This becomes much worse in warmer months due to the heat, sweating and friction of her extra skin. She has treated these noah for at least 6 months, without resolution. She will have improvement to the areas in cooler months, but will always have a return of symptoms.     She is not diabetic. She does not have a history of hypertrophic or keloid scarring. She does not have any abdominal bulges or hernias. She has had prior laparoscopic surgery to her abdomen. She had 1 prior .     Medications:  Current Outpatient Medications   Medication     phentermine (ADIPEX-P) 15 MG capsule     No current facility-administered medications for this visit.       Allergies:     Allergies   Allergen Reactions     Compazine [Prochlorperazine] Difficulty breathing     Toradol [Ketorolac] Difficulty breathing     Patient had IV Toradol in ED and states she passed out.  Had difficulty breathing when she woke up.     Oxymetazoline Itching     No antibiotic allergies.     Medical History:   No past medical history on file.  Hx morbid  obesity now s/p massive weight loss   Anxiety   Depression     Surgical History:  Past Surgical History:   Procedure Laterality Date     LAPAROSCOPIC GASTRIC SLEEVE N/A 2021    Procedure: GASTRECTOMY, SLEEVE, LAPAROSCOPIC;  Surgeon: Augusto Keenan MD;  Location:  OR       Family History:  No family history of bleeding or clotting problems or problems with anesthesia.       Social History:  Social History     Tobacco Use     Smoking status: Never     Smokeless tobacco: Never     No tobacco or nicotine use     ROS:  +excess abdominal skin  +skin rashes, irritation, panniculitis   +difficulty with breathing through nose    Physical Exam:   Vitals: There were no vitals taken for this visit.  Gen: well appearing, NAD   Abd: abdomen without any palpable bulge or hernia.   Redundant tissue that hands over pubic symphysis  Irritation and redness to skin under pannus, with healing boil and carbuncles present to folds and mons area due to skin folds  Redness, odor and irritation at umbilicus due to abdominal folds without drainage  Prior scar from laparoscopic port well healed, slightly raised and pale without tenderness   Prior  low transverse incision well healed without hypertrophy, but there is tenderness and firmness to  scar   Scaling, redness and skin irritation noted in photographs at lateral aspect of abdominal fold due to skin pannus causing panniculitis     Assessment:   38 year old female s/p 120lb massive weight loss now with abdominal pannus and panniculitis       Plan:  We will submit for PA for panniculectomy for medical necessity due to abdominal pannus s/p massive weight loss, panniculitis   Following approval, we will have patient meet with Dr. Galarza for pre-surgery consultation   At this time, we will not seek approval for abdominoplasty as patient is unsure if she desires future pregnancies.   During follow up with Dr. Galarza, patient can also discuss her concerns  over her prior rhinoplasty, as she has collapse of her left nostril with breathing and this causes her difficulty with breathing

## 2023-06-19 ENCOUNTER — TELEPHONE (OUTPATIENT)
Dept: UROLOGY | Facility: CLINIC | Age: 38
End: 2023-06-19
Payer: MEDICAID

## 2023-06-19 NOTE — TELEPHONE ENCOUNTER
Called pt to let her know Dr. Galarza has reached out to his PA to place orders.  I will reach out to his PA again for an update.    No answer, left generic voicemail for pt to return call to clinic.  Also sending a [employer] message.     Octavia Barrios RN

## 2023-06-19 NOTE — TELEPHONE ENCOUNTER
Patient came to [facility] spoke with Xochitl at desk C. Called up to surg- pt states insurance will now cover frank pt seen Linda montalvo. Was wondering if she could receive a call back to ensure orders are placed and she is able to proceed with getting scheduled.    [facility] Visit Facilitator- Surgical Specialties

## 2023-06-30 DIAGNOSIS — E65 ABDOMINAL PANNUS: Primary | ICD-10-CM

## 2023-07-05 ENCOUNTER — HOSPITAL ENCOUNTER (OUTPATIENT)
Facility: AMBULATORY SURGERY CENTER | Age: 38
Discharge: HOME | End: 2023-07-05
Attending: STUDENT IN AN ORGANIZED HEALTH CARE EDUCATION/TRAINING PROGRAM | Admitting: STUDENT IN AN ORGANIZED HEALTH CARE EDUCATION/TRAINING PROGRAM
Payer: MEDICAID

## 2023-07-05 ENCOUNTER — TELEPHONE (OUTPATIENT)
Dept: SURGERY | Facility: CLINIC | Age: 38
End: 2023-07-05
Payer: MEDICAID

## 2023-07-05 PROBLEM — E65 ABDOMINAL PANNUS: Status: ACTIVE | Noted: 2023-06-30

## 2023-07-05 NOTE — TELEPHONE ENCOUNTER
Called and spoke with patient with [facility] . Explained to patient our next opening is not until November. Patient stated she was told she could have this surgery in July as she is leaving the country in August. Explained to patient we do not have openings before then. She is requesting a call from the clinic to please follow up and refer her to another clinic/doctor so she can have her surgery before she leaves the country.    Patient was also under the impression TURNER Meyers would be doing the surgery, not Dr. Galarza. She would like a call back today.    Jessica Mead on 7/5/23 at 12:03 PM  Surgery Scheduling  Ph: [phone].

## 2023-07-18 NOTE — TELEPHONE ENCOUNTER
Dr. Mack is booking into late October for cosmetic cases and may be filled by the time patient sees provider.    Writer scheduled surgery for 11/15 with Dr. Galarza to reserve time but will cancel if needed.     Stella Mead on 7/18/2023 at 2:51 PM

## 2023-08-12 ENCOUNTER — HEALTH MAINTENANCE LETTER (OUTPATIENT)
Age: 38
End: 2023-08-12

## 2023-11-01 DIAGNOSIS — E66.3 OVERWEIGHT (BMI 25.0-29.9): ICD-10-CM

## 2023-11-01 DIAGNOSIS — Z86.39 HISTORY OF OBESITY: ICD-10-CM

## 2023-11-01 RX ORDER — PHENTERMINE HYDROCHLORIDE 15 MG/1
15 CAPSULE ORAL EVERY MORNING
Qty: 30 CAPSULE | Refills: 2 | OUTPATIENT
Start: 2023-11-01

## 2023-11-01 NOTE — TELEPHONE ENCOUNTER
Refill denied at this time. Patient needs appointment with Angela Llamas PA-C. Gap in medication therapy. [employer] message sent to patient to schedule appointment.

## 2023-11-01 NOTE — TELEPHONE ENCOUNTER
Recieved refill request for phentermine (ADIPEX-P) 15 MG capsule  . Patient needs appointment scheduled prior to any refills. Clinic Coordinator notified and will follow up with the patient as appropriate. The pharmacy has been notified that the medication will not be refilled prior to an appointment being scheduled.

## 2023-11-15 ENCOUNTER — TELEPHONE (OUTPATIENT)
Dept: SURGERY | Facility: CLINIC | Age: 38
End: 2023-11-15
Payer: MEDICAID

## 2023-11-15 NOTE — TELEPHONE ENCOUNTER
11/15  services called and left voicemail. Provided [phone] as a call back number. [employer] message sent.     Patient needs to schedule a visit with Dr. Galarza in Mg Surg on 12/1/2023 at 10:30 AM. Spot on hold for patient.     Nicole borja Complex   Dermatology, Surgery, Urology  [facility]       ----- Message from Tarsha Manzano LPN sent at 11/15/2023 11:29 AM CST -----  Regarding: FW: surgery 11/15  Scheduling,     Please schedule 12/1/23 at 10:30. Spot on hold.    Thank you!

## 2023-11-20 NOTE — TELEPHONE ENCOUNTER
11/20 2nd attempt.  services called and left voicemail. Provided [phone] as a call back number. [employer] message sent.      Patient needs to schedule a visit with Dr. Galarza in Mg Surg on 12/1/2023 at 10:30 AM. Spot on hold for patient.     Nicole borja Complex   Dermatology, Surgery, Urology  [facility]

## 2023-11-22 NOTE — TELEPHONE ENCOUNTER
"11/22 Patient arrived at clinic in [place]. Patient asked to speak with a nurse, in which  asked patient if it was in regards to scheduling her appointment with Dr. Galarza for a surgical consult. Patient agreed to schedule, but patient stated \"Who has contacted me, I have not been contacted\" Previous telephone encounters, MyChart messages, and letters have shown multiple attempts made by . Patient then stated that we did not have the correct phone number in her chart. She verbally stated \"[phone]\" is her  correct number, which is the phone number in her chart.     Patient agreed to schedule with Dr. Galarza on 12/1/2023 at 10:30 AM, but  had to reiterate multiple times that this was an appointment prior to surgery so she can speak with Dr. Galarza in-person. Patient seems to still be under the impression that this date/time we are offering her is for surgery at the \"Hospital.\"     Scheduled for 12/1/2023 at 10:30 AM with Dr. Galarza for surgery consult. Patient aware of location and time/date.     Nicole borja Complex   Dermatology, Surgery, Urology  [facility]     "

## 2023-12-01 ENCOUNTER — OFFICE VISIT (OUTPATIENT)
Dept: SURGERY | Facility: CLINIC | Age: 38
End: 2023-12-01
Payer: COMMERCIAL

## 2023-12-01 DIAGNOSIS — E65 ABDOMINAL PANNUS: Primary | ICD-10-CM

## 2023-12-01 PROCEDURE — 99214 OFFICE O/P EST MOD 30 MIN: CPT | Performed by: STUDENT IN AN ORGANIZED HEALTH CARE EDUCATION/TRAINING PROGRAM

## 2023-12-01 NOTE — LETTER
12/1/2023         RE: Karina Rice  [address] [place] [address]  [facility] [address]        Dear Colleague,    Thank you for referring your patient, Karina Rice, to the [facility]. Please see a copy of my visit note below.    PRS    HPI: 38-year-old female with history of gastric sleeve procedure and 90+ pound weight loss presenting with lower abdominal skin excess with intertrigo.  Patient has had rashes for some time after the weight loss.  She has been weight stable for the last 6 months at around 145-150 pounds.  She is not interested in a cosmetic procedure that would result in out-of-pocket cost.    ROS: Negative, see HPI  Past medical history: Depression, anxiety  Past surgical history: Gastric sleeve  Medications: No blood thinners  Allergies: Compazine, Toradol  Social history: Non-smoker, denies any tobacco or nicotine use  Family history: No bleeding or clotting problems, issues with anesthesia    Examination:  Nonlabored breathing  Not distressed  Lower abdominal skin excess with pannus and underlying intertrigo    A/P: 38-year-old female status post gastric sleeve and 90+ pound weight loss presenting with lower abdominal pannus    -Patient would be a good candidate for either a panniculectomy versus an abdominoplasty.  Since patient does not want to incur any out-of-pocket cost, patient could have a panniculectomy only.  Explained the limitations of panniculectomy including as compared with abdominoplasty.  She is fine with only removing the skin below the umbilicus to help with the rashes that are underneath.  -Discussed the risks of surgery, including but not limited to: Infection, bleeding, pain, poor scarring, injury to deeper structures, wound healing issues, need for additional revision surgery, suboptimal aesthetic result, DVT, PE, death.  Despite these risks, patient consents to and would like to proceed with panniculectomy.  -Photography already done  -A total  of 30 minutes was devoted to review of chart, direct face-to-face patient counseling and documentation during this encounter, exclusive of any procedure performed.    Gavin Galarza MD, PhD      Again, thank you for allowing me to participate in the care of your patient.        Sincerely,        Gavin Galarza MD

## 2023-12-01 NOTE — NURSING NOTE
Karina Rice's chief complaint for this visit includes:  Chief Complaint   Patient presents with    RECHECK     panniculectomy discussion, pt saw TURNER Mckeon  Please use iPad or phones [phone] to reach  and follow prompts        PCP: Daniel, [facility]    Referring Provider:  No referring provider defined for this encounter.    There were no vitals taken for this visit.  Data Unavailable        Allergies   Allergen Reactions    Compazine [Prochlorperazine] Difficulty breathing    Toradol [Ketorolac] Difficulty breathing     Patient had IV Toradol in ED and states she passed out.  Had difficulty breathing when she woke up.    Oxymetazoline Itching         Do you need any medication refills at today's visit? No    [facility] Clinic Assistant- Surgical Specialties

## 2023-12-01 NOTE — PROGRESS NOTES
PRS    HPI: 38-year-old female with history of gastric sleeve procedure and 90+ pound weight loss presenting with lower abdominal skin excess with intertrigo.  Patient has had rashes for some time after the weight loss.  She has been weight stable for the last 6 months at around 145-150 pounds.  She is not interested in a cosmetic procedure that would result in out-of-pocket cost.    ROS: Negative, see HPI  Past medical history: Depression, anxiety  Past surgical history: Gastric sleeve  Medications: No blood thinners  Allergies: Compazine, Toradol  Social history: Non-smoker, denies any tobacco or nicotine use  Family history: No bleeding or clotting problems, issues with anesthesia    Examination:  Nonlabored breathing  Not distressed  Lower abdominal skin excess with pannus and underlying intertrigo    A/P: 38-year-old female status post gastric sleeve and 90+ pound weight loss presenting with lower abdominal pannus    -Patient would be a good candidate for either a panniculectomy versus an abdominoplasty.  Since patient does not want to incur any out-of-pocket cost, patient could have a panniculectomy only.  Explained the limitations of panniculectomy including as compared with abdominoplasty.  She is fine with only removing the skin below the umbilicus to help with the rashes that are underneath.  -Discussed the risks of surgery, including but not limited to: Infection, bleeding, pain, poor scarring, injury to deeper structures, wound healing issues, need for additional revision surgery, suboptimal aesthetic result, DVT, PE, death.  Despite these risks, patient consents to and would like to proceed with panniculectomy.  -Photography already done  -A total of 30 minutes was devoted to review of chart, direct face-to-face patient counseling and documentation during this encounter, exclusive of any procedure performed.    Gavin Galarza MD, PhD

## 2023-12-11 ENCOUNTER — TELEPHONE (OUTPATIENT)
Dept: SURGERY | Facility: CLINIC | Age: 38
End: 2023-12-11
Payer: MEDICAID

## 2023-12-11 NOTE — TELEPHONE ENCOUNTER
"Karina presented as a walk in to the clinic requesting to schedule her surgery. RN asked pt if she has the clinic number to call. Pt states \"I have been trying but no one has called me back it has been 10 days since I saw  and I need to get this scheduled\". RN explained to Karina that  is not in clinic here in [place] presently nor is his surgery scheduler but RN would send a message to her to help schedule the surgery as soon as possible. Routing to 's surgery scheduler to please assist with scheduling surgery. Pt can be reached at [phone]..Thank you Audrey Michel RN    "

## 2023-12-12 NOTE — TELEPHONE ENCOUNTER
Writer was unaware that patient was proceeding with rescheduling surgery, writer was waiting for follow up after clinic visit with Dr. Galarza.    Left voicemail for patient regarding scheduling surgery with Dr. Galarza.    It is unclear if patient needs an , so writer attempted call without use of . However, will use one if needed.    Noted next available date for surgery is on 5/10 in [place]. Placed tentative hold on schedule.    Provided direct contact number to discuss.    P: [phone]    __    Annette Johns, Senior Perioperative Coordinator, on 12/12/2023 at 5:05 PM

## 2023-12-19 NOTE — TELEPHONE ENCOUNTER
Called patient via Pashto . Patient spoke without the need of .    Offered patient next available surgery date, which is 5/8.    Patient stated this date will not work, and states she is flying out in February. She stated the clinic has known about this.    Patient stated Dr. Galarza promised her that surgery would be scheduled in December or early January at the hospital.    Writer inquired what she meant by the hospital, and patient states she wants to stay overnight after surgery. Writer explained this may not be covered by insurance. Patient stated this does not matter.    Writer explained that Dr. Galarza does not have availability in December or January, and the next available date is 5/8. Furthermore, if surgery is at the hospital, it may be different as it would require hospital staffing as well as needing a hospital bed. Patient stated she would have someone to stay with her after surgery if location is at the surgery center.    Patient states that everyone has known she is flying out in February, since she was seen 6 months ago. Stated she was cancelled day of surgery and when she saw Dr. Galarza two weeks ago, Dr. Galarza told her we would schedule her in December at this visit. She stated no one has contacted her since.    Writer informed patient that writer did not receive any update on rescheduling, nor the promise of scheduling surgery in December or early January, or being scheduled at the hospital and being admitted after.     Writer did inform patient that writer left a detailed voicemail last week and patient did not return writer's call.    Informed patient that a conversation with Dr. Galarza will be needed as he is going on paternity leave in late January and has no surgery availability in December or January. Informed her that he is already operating until 8 or 9 PM most nights.    Patient states she needs to know if she needs to see another surgeon.     Writer  noted she will receive a call by Thursday with a more definite plan.    Writer will involve clinic manager due to patient statements about promises.    __    Annette Johns, Senior Perioperative Coordinator, on 12/19/2023 at 2:07 PM  P: [phone]

## 2024-01-02 ENCOUNTER — TELEPHONE (OUTPATIENT)
Dept: SURGERY | Facility: CLINIC | Age: 39
End: 2024-01-02

## 2024-01-02 NOTE — TELEPHONE ENCOUNTER
Patient presented to clinic today asking to speak with a nurse to review her current plan of care with Dr. Galarza.  This nurse was notified of patient's request.  I introduced myself to patient and provided her with a private space to discuss her concerns and questions.  Patient stated that she would like to know when her surgery is going to be scheduled with Dr. Galarza.  She reviewed with me that there had been some miscommunication leading up to today, and she was unable to have the surgery when it was originally scheduled.   She explained to me that she is leaving the country on February 25th and is hoping that she is able to be scheduled for surgery with Dr. Galarza prior to this date.  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  Patient stated that she is willing to switch to a different surgeon if there are sooner openings in the OR and would like Dr. Galarza to provide guidance of a different provider for this particular surgical procedure.    This writer reviewed that I will connect with the janet-operative coordinator to investigate further, a date of surgery.  Reviewed with patient that Dr. Galarza's coordinator is on vacation until January 8th, but will communicate our interaction to the team as an update.  Patient verbalized understanding and agreeable to plan.   This writer will also update Dr. Galarza.      Patient prefers to receive a phone call rather than [employer] message for an update with surgery plans.      Terrie Riggs RN on 1/2/2024 at 12:07 PM

## 2024-01-04 NOTE — TELEPHONE ENCOUNTER
This writer connected with patient to review plan of care as it relates to her request for an elective surgical procedure, panniculectomy, under the care of Dr. Galarza.  Patient is asking for a surgical date to be scheduled prior to February 25th, 2024 as she is planning to leave the country for 6 months or up to 1 year.    This was reviewed with Dr. Galarza.  Dr. Galarza is booking out until April at this time with the inability to move this surgery date up any sooner.  This information was reviewed with Karina and the option was provided to the patient to schedule with a different surgeon for a second opinion, per Dr. Galarza's recommendation.  The patient is requesting to schedule with another provider within the [facility] system.  This writer provided patient with the phone number for our [facility] to further assist her.    Patient verbalized understanding that the surgical procedure will not be scheduled with Dr. Galarza as she will be contacting a different provider for a second opinion.   Team will be updated.      Terrie Riggs RN on 1/4/2024 at 11:04 AM

## 2024-03-01 ENCOUNTER — LAB (OUTPATIENT)
Dept: LAB | Facility: CLINIC | Age: 39
End: 2024-03-01
Payer: MEDICAID

## 2024-03-01 ENCOUNTER — TELEPHONE (OUTPATIENT)
Dept: ENDOCRINOLOGY | Facility: CLINIC | Age: 39
End: 2024-03-01

## 2024-03-01 ENCOUNTER — OFFICE VISIT (OUTPATIENT)
Dept: ENDOCRINOLOGY | Facility: CLINIC | Age: 39
End: 2024-03-01
Payer: MEDICAID

## 2024-03-01 VITALS
HEIGHT: 63 IN | HEART RATE: 85 BPM | SYSTOLIC BLOOD PRESSURE: 118 MMHG | DIASTOLIC BLOOD PRESSURE: 86 MMHG | BODY MASS INDEX: 26.79 KG/M2 | OXYGEN SATURATION: 98 % | WEIGHT: 151.2 LBS

## 2024-03-01 DIAGNOSIS — Z86.39 HISTORY OF OBESITY: ICD-10-CM

## 2024-03-01 DIAGNOSIS — E65 ABDOMINAL PANNUS: ICD-10-CM

## 2024-03-01 DIAGNOSIS — E55.9 VITAMIN D DEFICIENCY: ICD-10-CM

## 2024-03-01 DIAGNOSIS — K21.9 GASTROESOPHAGEAL REFLUX DISEASE, UNSPECIFIED WHETHER ESOPHAGITIS PRESENT: ICD-10-CM

## 2024-03-01 DIAGNOSIS — L98.7 EXCESS SKIN OF ABDOMEN: ICD-10-CM

## 2024-03-01 DIAGNOSIS — Z98.84 S/P LAPAROSCOPIC SLEEVE GASTRECTOMY: ICD-10-CM

## 2024-03-01 DIAGNOSIS — E66.3 OVERWEIGHT (BMI 25.0-29.9): ICD-10-CM

## 2024-03-01 DIAGNOSIS — Z98.84 S/P LAPAROSCOPIC SLEEVE GASTRECTOMY: Primary | ICD-10-CM

## 2024-03-01 LAB
ALBUMIN SERPL BCG-MCNC: 4.8 G/DL (ref 3.5–5.2)
ALP SERPL-CCNC: 61 U/L (ref 40–150)
ALT SERPL W P-5'-P-CCNC: 16 U/L (ref 0–50)
ANION GAP SERPL CALCULATED.3IONS-SCNC: 13 MMOL/L (ref 7–15)
AST SERPL W P-5'-P-CCNC: 27 U/L (ref 0–45)
BILIRUB SERPL-MCNC: 0.4 MG/DL
BUN SERPL-MCNC: 8.5 MG/DL (ref 6–20)
CALCIUM SERPL-MCNC: 9.9 MG/DL (ref 8.6–10)
CHLORIDE SERPL-SCNC: 100 MMOL/L (ref 98–107)
CHOLEST SERPL-MCNC: 190 MG/DL
CREAT SERPL-MCNC: 0.59 MG/DL (ref 0.51–0.95)
DEPRECATED HCO3 PLAS-SCNC: 26 MMOL/L (ref 22–29)
EGFRCR SERPLBLD CKD-EPI 2021: >90 ML/MIN/1.73M2
ERYTHROCYTE [DISTWIDTH] IN BLOOD BY AUTOMATED COUNT: 13.1 % (ref 10–15)
FASTING STATUS PATIENT QL REPORTED: NO
FERRITIN SERPL-MCNC: 8 NG/ML (ref 6–175)
GLUCOSE SERPL-MCNC: 82 MG/DL (ref 70–99)
HBA1C MFR BLD: 5.5 %
HCT VFR BLD AUTO: 35.9 % (ref 35–47)
HDLC SERPL-MCNC: 80 MG/DL
HGB BLD-MCNC: 11.6 G/DL (ref 11.7–15.7)
LDLC SERPL CALC-MCNC: 98 MG/DL
MCH RBC QN AUTO: 24.7 PG (ref 26.5–33)
MCHC RBC AUTO-ENTMCNC: 32.3 G/DL (ref 31.5–36.5)
MCV RBC AUTO: 77 FL (ref 78–100)
NONHDLC SERPL-MCNC: 110 MG/DL
PLATELET # BLD AUTO: 288 10E3/UL (ref 150–450)
POTASSIUM SERPL-SCNC: 3.5 MMOL/L (ref 3.4–5.3)
PROT SERPL-MCNC: 7.9 G/DL (ref 6.4–8.3)
PTH-INTACT SERPL-MCNC: 68 PG/ML (ref 15–65)
RBC # BLD AUTO: 4.69 10E6/UL (ref 3.8–5.2)
SODIUM SERPL-SCNC: 139 MMOL/L (ref 135–145)
TRIGL SERPL-MCNC: 58 MG/DL
WBC # BLD AUTO: 6.6 10E3/UL (ref 4–11)

## 2024-03-01 PROCEDURE — 80053 COMPREHEN METABOLIC PANEL: CPT | Performed by: PATHOLOGY

## 2024-03-01 PROCEDURE — 99215 OFFICE O/P EST HI 40 MIN: CPT

## 2024-03-01 PROCEDURE — 85027 COMPLETE CBC AUTOMATED: CPT | Performed by: PATHOLOGY

## 2024-03-01 PROCEDURE — 80061 LIPID PANEL: CPT | Performed by: PATHOLOGY

## 2024-03-01 PROCEDURE — 83970 ASSAY OF PARATHORMONE: CPT | Performed by: PATHOLOGY

## 2024-03-01 PROCEDURE — 82607 VITAMIN B-12: CPT

## 2024-03-01 PROCEDURE — 82306 VITAMIN D 25 HYDROXY: CPT

## 2024-03-01 PROCEDURE — 99000 SPECIMEN HANDLING OFFICE-LAB: CPT | Performed by: PATHOLOGY

## 2024-03-01 PROCEDURE — 82728 ASSAY OF FERRITIN: CPT | Performed by: PATHOLOGY

## 2024-03-01 PROCEDURE — 83036 HEMOGLOBIN GLYCOSYLATED A1C: CPT

## 2024-03-01 PROCEDURE — 36415 COLL VENOUS BLD VENIPUNCTURE: CPT | Performed by: PATHOLOGY

## 2024-03-01 PROCEDURE — 84590 ASSAY OF VITAMIN A: CPT | Mod: 90 | Performed by: PATHOLOGY

## 2024-03-01 RX ORDER — PHENTERMINE HYDROCHLORIDE 15 MG/1
15 CAPSULE ORAL EVERY MORNING
Qty: 30 CAPSULE | Refills: 3 | Status: SHIPPED | OUTPATIENT
Start: 2024-03-01

## 2024-03-01 ASSESSMENT — PAIN SCALES - GENERAL: PAINLEVEL: NO PAIN (0)

## 2024-03-01 NOTE — PATIENT INSTRUCTIONS
"Thank you for allowing us the privilege of caring for you. We hope we provided you with the excellent service you deserve.   Please let us know if there is anything else we can do for you so that we can be sure you are completely satisfied with your care experience.    To ensure the quality of our services you may be receiving a patient satisfaction survey from an independent patient satisfaction monitoring company.    The greatest compliment you can give is a \"Likely to Recommend\"    Your visit was with Josselin Clifford PA-C today.    Instructions per today's visit:     Mao Rice, it was great to visit with you today.  Here is a review of our visit.  If our clinic scheduler is not able to reach you please call [phone] to schedule your next appointments.    Plan  Restart omeprazole 20mg for heart burn symptoms    Restart phentermine 15mg daily- check your blood pressure after taking for 2 weeks   Goals we discussed today:   Increase protein 20-30g per meal   Increase water, goal 64oz daily   Go to gym 2x a week   Labs ordered today: post op labs + ferritin due to recent worsened fatigue   Follow up with Josselin or Angela Randall in 3 months   Dietician appointment to be scheduled asap   Referral placed for plastic surgery for removal of excess abdominal skin    Referral placed with primary care [place]   Keep up the excellent work!       Information about Video Visits with [facility]: video visit information  _________________________________________________________________________________________________________________________________________________________  If you are asked by your clinic team to have your blood pressure checked:  [facility] do offer several locations for blood pressure checks. Please follow the below link to schedule an appointment. Scheduling an appointment at the pharmacy for a blood pressure check is now " preferred.    Appointment Plus ([email or web address])  _________________________________________________________________________________________________________________________________________________________  Important contact and scheduling information:  Please call our contact center at [phone] to schedule your next appointments.  To find a lab location near you, please call [phone].  For any nursing questions or concerns call Yumiko Branch LPN at [phone] or Kim Eller RN at [phone]  Please call during clinic hours Monday through Friday 8:00a - 4:00p if you have questions or you can contact us via [email or web address] at anytime and we will reply during clinic hours.    Lab results will be communicated through My Chart or letter (if My Chart not used). Please call the clinic if you have not received communication after 1 week or if you have any questions.?  Clinic Fax: [phone]    _________________________________________________________________________________________________________________________________________________________  Meal Replacement Products:    Here is the link to our new e-store where you can purchase our meal replacement products    [facility] E-Store  [email or web address]    The one week starter kit is a great way to sample a variety of products and see what works for you.    If you want more information about the product go to: [employer]    If you are an employee or [facility] [employer] or [facility] please contact your care team for a 10% estore discount    Free Shipping for orders over $75     Benefits of meal replacements products:    Portion and calorie control  Improved nutrition  Structured eating  Simplified food choices  Avoid contact with trigger  foods  _________________________________________________________________________________________________________________________________________________________  Interested in working with a health ?  Health coaches work with you to improve your overall health and wellbeing.  They look at the whole person, and may involve discussion of different areas of life, including, but not limited to the four pillars of health (sleep, exercise, nutrition, and stress management). Discuss with your care team if you would like to start working a health .  Health Coaching-3 Pack: Schedule by calling [phone]    $99 for three health coaching visits    Visits may be done in person or via phone    Coaching is a partnership between the  and the client; Coaches do not prescribe or diagnose    Coaching helps inspire the client to reach his/her personal goals   _________________________________________________________________________________________________________________________________________________________  24 Week Healthy Lifestyle Plan:    Our mission in the 24-week Healthy Lifestyle Plan is to provide you with individualized care by giving you the tools, education and support you need to lose weight and maintain a healthy lifestyle. In your 24-week journey, you ll be supported by a dedicated weight loss team that includes registered dietitians, medical weight management providers, health coaches, and nurses -- all with special expertise in weight loss -- to help you every step of the way.     Monthly meetings with your registered dietician or medical weight management provider help to review your progress, update your care plan, and make any adjustments needed to ensure success. Between these visits, weekly and bi-weekly health  visits will help you focus on the four pillars of weight loss -- stress, sleep, nutrition, and exercise -- and how you can best adapt each to achieve sustainable weight loss  results.    In addition, you will be given exclusive access to online wellbeing classes through [employer].  Your initial visit will be with a medical weight management provider who will help to understand your weight loss goals and ensure this program is the right fit for you. Please let our team know if you are interested in the 24 week plan by sending a message to your care team or calling [phone] to schedule.  _________________________________________________________________________________________________________________________________________________________  __________  [facility] Get Moving Program  Our team of physical therapists is trained to help you understand and take control of your condition. They will perform a thorough evaluation to determine your ability for activity and develop a customized plan to fit your goals and physical ability.  Scheduling: Unsure if the Get Moving program is right for you? Discuss the program with your medical provider or diabetes educator. You can also call us at [phone] to ask questions or schedule an appointment.   [facility] Get Moving Program  ____________________________________________________________________________________________________________________________________________________________________________   [facility] Diabetes Prevention Program (DPP)  If you have prediabetes and Medicare please contact us via [employer] to learn more about the Diabetes Prevention Program (DPP)  Program Details:   [facility] offers the year-long Diabetes Prevention Program (DPP). The program helps you to make lifestyle changes that prevent or delay type 2 diabetes by supporting healthy eating, increased physical activity, stress reduction and use of coping skills.   On average, previous [facility] DPP cohorts have lost and maintained at least 5% of their starting weight throughout the program and averaged more than 150 minutes of physical  activity per week.  Participants meet weekly for one-hour group sessions over sixteen weeks, every other week for the next 8 weeks, and monthly for the last six months.   A year-long maintenance program is also available for participants who complete the first year.   Location & Cost:   During the COVID-19 Public Health Emergency, the program is offered virtually. When in-person classes can resume, they will be held at [facility].  For people with Medicare, the program is covered in full. A self-pay option will also be available for those with non-Medicare insurance plans.   ______________________________________________________________________________________________________________________________________________________________________________________________________________________________    To work with a Behavioral Health Psychologist:    Call to schedule:    Max Lin - [phone]  Kalli Dean - [phone]  Annelise Hodge - [phone]  Emy Alonso - [phone]   Alix Sykes PhD (cannot accept Medicare) [phone]        Thank you,   [facility] Comprehensive Weight Management Team

## 2024-03-01 NOTE — NURSING NOTE
"(   Chief Complaint   Patient presents with    RECHECK     Return bariatric    )    ( Weight: 68.6 kg (151 lb 3.2 oz) )  ( Height: 160 cm (5' 3\") )  ( BMI (Calculated): 26.78 )  (   )  (   )  (   )  (   )  (   )  (   )    ( BP: 118/86 )  (   )  (   )  (   )  ( Pulse: 85 )  (   )  ( SpO2: 98 % )    (   Patient Active Problem List   Diagnosis    Morbid obesity (H)    S/P laparoscopic sleeve gastrectomy    Deviated septum    Episode of recurrent major depressive disorder (H24)    Anxiety with somatization    Fatty liver    PTSD (post-traumatic stress disorder)    Vitamin D deficiency    Elevated liver enzymes    Overweight (BMI 25.0-29.9)    Abdominal pannus    )  (   No current outpatient medications on file.    )  ( Diabetes Eval:    )    ( Pain Eval:  No Pain (0) )    ( Wound Eval:       )    (   History   Smoking Status    Never   Smokeless Tobacco    Never    )    ( Signed By:  Usman Zarco, EMT; March 1, 2024; 1:12 PM )    "

## 2024-03-01 NOTE — PROGRESS NOTES
"Return Bariatric Surgery Note    RE: Karina Rice  MR#: [id number]  : 1985  VISIT DATE: Mar 1, 2024      Dear Clinic, [facility],    I had the pleasure of seeing your patient, Karina Rice, in my post-bariatric surgery assessment clinic.    Assessment & Plan   Problem List Items Addressed This Visit    None         { E&M time:861561}    CHIEF COMPLAINT: Post-bariatric surgery follow-up. 3  years s/p Laparoscopic sleeve gastrectomy    with Dr. Keenan .    HISTORY OF PRESENT ILLNESS:      2021     1:08 PM   Questions Regarding Prior Weight Loss Surgery Reviewed With Patient   I had the following weight loss procedure Sleeve Gastrectomy   What year was your surgery?    How has your weight changed since your last visit? I have lost weight   Are you currently taking any weight loss medications? No   Do you currently have any of the following None of the above   ER since last visit No   Hospital since last visit No   Do you have any concerns today? none         Plan  ***  Goals we discussed today: ***  Labs ordered today: ***  Follow up with Josselin in *** months   Dietician appointment ***  Keep up the excellent work!     Anti-obesity medication started today for this patient   {Obesity Med Options (Optional):358892}     Potential anti-obesity medications for this patient the future if there are issues with cost or side effects  {Obesity Med Options (Optional):111287}    The following medications could be considered with caution for this patient   {Obesity Med Options (Optional):085610}     Contraindicated Weight loss medications for this patient   {Obesity Med Contraindications:271034}    Interval History:     Last seen by Angela Llamas 1 year ago, 2023  [place] weight of 138lbs, and was able to maintain weight for 1 years. Weight regain has been gradual due to increase hunger. Feels like she is hungry \"every 10 minutes\".     Will start Phentermine today to help with " hunger. Anxiety is well controlled without medications. She will monitor for any increase symptoms. Previous reaction of oxymetazoline with itching, will monitor. No hx of HTN. No contraindications. Discussed side effects, risks, and benefits.      [place] weight - 138lbs.   Since last visit:       Anti-obesity medication history:     Current:       Failed/contraindicated:       Recent diet changes: ***    -Protein ***  -Water ***    Recent exercise/activity changes: ***    Recent stressors: ***    Recent sleep changes: ***    Vitamins/Labs: ***    GERD symptoms: ***    Nausea : ***    Vomiting : ***    Dysphagia:  ***    Pregnancy: ***      Weight History:      5/18/2021     1:08 PM   --   What is your highest lifetime weight? 240   What is your lowest weight since surgery? (In pounds) 210                          5/18/2021     1:08 PM   Questions Regarding Co-Morbidities and Health Concerns Reviewed With Patient   Pre-diabetes Never   Diabetes II Never   High Blood Pressure Never   High cholesterol Never   Heartburn/Reflux Never   Sleep apnea Never   PCOS Never   Back pain Stayed the same   Joint pain Stayed the same   Lower leg swelling Improved           5/18/2021     1:08 PM   Eating Habits   How many meals do you eat per day? 3   Do you snack between meals? No   How much food are you eating at each meal? Less than 1/2 cup   Are you able to separate your meals and liquids by at least 30 minutes? No   Are you able to avoid liquid calories? Yes           5/18/2021     1:08 PM   Exercise Questions Reviewed With Patient   How often do you exercise? Never   What keeps you from being more active? Pain       Social History:       No data to display                Medications:  No current outpatient medications on file.     No current facility-administered medications for this visit.          No data to display                ROS:  GI:        No data to display              Skin:        No data to display           "    Psych:        No data to display              Female Only:       5/18/2021     1:08 PM   BAR PATRICIO ROS -    Female only Irregular menstrual cycles   Stress urinary incontinence No                No data to display                  PHYSICAL EXAM:  Objective    Ht 1.6 m (5' 3\")   BMI 26.77 kg/m    Ht 1.6 m (5' 3\")   BMI 26.77 kg/m    Body mass index is 26.77 kg/m .  Physical Exam    {video visit exam brief selected:004879}          Sincerely,    Josselin Clifford PA-C    "

## 2024-03-01 NOTE — PROGRESS NOTES
Return Bariatric Surgery Note    RE: Karina Rice  MR#: [id number]  : 1985  VISIT DATE: Mar 1, 2024      Dear Clinic, [facility],    I had the pleasure of seeing your patient, Karina Rice, in my post-bariatric surgery assessment clinic.    Assessment & Plan   Problem List Items Addressed This Visit       Overweight (BMI 25.0-29.9) - Primary    Relevant Medications    phentermine 15 MG capsule    omeprazole (PRILOSEC) 20 MG DR capsule    Other Relevant Orders    CBC with platelets    Comprehensive metabolic panel    Hemoglobin A1c    Lipid panel reflex to direct LDL Fasting    Parathyroid Hormone Intact    Vitamin B12    Vitamin A    Vitamin D Deficiency    Ferritin    S/P laparoscopic sleeve gastrectomy    Relevant Medications    phentermine 15 MG capsule    Other Relevant Orders    CBC with platelets    Comprehensive metabolic panel    Hemoglobin A1c    Lipid panel reflex to direct LDL Fasting    Parathyroid Hormone Intact    Vitamin B12    Vitamin A    Vitamin D Deficiency    Ferritin    Vitamin D deficiency    Relevant Orders    CBC with platelets    Comprehensive metabolic panel    Hemoglobin A1c    Lipid panel reflex to direct LDL Fasting    Parathyroid Hormone Intact    Vitamin B12    Vitamin A    Vitamin D Deficiency     Other Visit Diagnoses       History of obesity        Relevant Medications    phentermine 15 MG capsule    omeprazole (PRILOSEC) 20 MG DR capsule    Other Relevant Orders    CBC with platelets    Comprehensive metabolic panel    Hemoglobin A1c    Lipid panel reflex to direct LDL Fasting    Parathyroid Hormone Intact    Vitamin B12    Vitamin A    Vitamin D Deficiency    Ferritin    Gastroesophageal reflux disease, unspecified whether esophagitis present        Relevant Medications    omeprazole (PRILOSEC) 20 MG DR capsule               52 minutes spent by me on the date of the encounter doing chart review, history and exam, documentation and further activities  "per the note    CHIEF COMPLAINT: Post-bariatric surgery follow-up. 3  years s/p Laparoscopic sleeve gastrectomy    with Dr. Keenan .       HISTORY OF PRESENT ILLNESS:      5/18/2021     1:08 PM   Questions Regarding Prior Weight Loss Surgery Reviewed With Patient   I had the following weight loss procedure Sleeve Gastrectomy   What year was your surgery? 2021   How has your weight changed since your last visit? I have lost weight   Are you currently taking any weight loss medications? No   Do you currently have any of the following None of the above   ER since last visit No   Hospital since last visit No   Do you have any concerns today? none         Plan  Restart omeprazole 20mg for heart burn symptoms    Restart phentermine 15mg daily- check your blood pressure after taking for 2 weeks   Goals we discussed today:   Increase protein 20-30g per meal   Increase water, goal 64oz daily   Go to gym 2x a week   Labs ordered today: post op labs + ferritin due to recent worsened fatigue   Follow up with Josselin or Angela Randall in 3 months   Dietician appointment to be scheduled asap   Referral placed for plastic surgery for removal of excess abdominal skin    [place] placed with [place] care [place]   Keep up the excellent work!     Anti-obesity medication started today for this patient   PHENTERMINE  No history of hypertension  No history of CVA   No history of cardiovascular disease   No history of cardiac arrhythmias   No history of glaucoma  No history of drug abuse  .       Interval History:   Last seen by Angela Llamas 1 year ago, 4/17/2023  [place] weight of 138lbs, and was able to maintain weight for 1 years. Weight regain has been gradual due to increase hunger. Feels like she is hungry \"every 10 minutes\".     Will start Phentermine today to help with hunger. Anxiety is well controlled without medications. She will monitor for any increase symptoms. Previous reaction of " oxymetazoline with itching, will monitor. No hx of HTN. No contraindications. Discussed side effects, risks, and benefits.        [place] weight post surgery - 138lbs.     Since last visit:   Took phentermine for several months, saw improvement in hunger levels, was unable to get refills due to lack of follow up. Discussed the importance of regular follow up to continue taking these medications.     Weight is overall stable compared to previous appointment back in April 2023, however between these appointments she had lost down to 140lbs  and then saw 11 pound weight gain over 2 months which was very stressful for her.     Lately has felt that she's hungry throughout the day, has noticed worsened fatigue.     Tried to get in to see plastic surgery to discuss excess skin removal (and dealing with rashes under skin folds) , was cleared for panniculectomy but unable to get a surgery date due to lack of communication and follow up from surgeon. Requesting new referral to meet with a different plastic surgeon today.     Had plans to go to [place] for several months and this was in part a reason why she was not able to schedule her panniculectomy, but this trip has for now been canceled.     Anti-obesity medication history:     Current:   none    Past:  Phentermine 15mg- felt good with this, no side effects. Helped decrease hunger, hoping to restart today .     Recent diet changes: increased carbohydrates lately (dougnuts, bread). Drinks coke zero.   Eating 2-3 meals per day, typically half a plate per meal.   Breakfast: eggs, with cheese, toast . Coffee with zero sugar creamer/   Lunch: rice, macaroni, with meat and vegetables   Dinner: sandwich with fish, meat   After dinner snack: chips, salty foods, candy, doughnuts   Has been eating in the middle of the night 3x a week    -Protein: meat or eggs at every meal   -Water: doesn't drink much water. Generally drinking coffee and zero sugar soda.     Recent exercise/activity  changes: hasn't been exercising for the last 2 months. Struggles to walk outdoors due to safety concerns, lives in [facility]. Plans to go to the gym more frequently.    Recent stressors: in between jobs, not currently working.     Recent sleep changes: sleeps more lately     Vitamins/Labs: due for 1 year labs, not currently taking any vitamins     GERD symptoms: heart burn every day lately, has not been taking heart burn medications. Open to starting omeprazole 20mg today      Nausea : occasional     Vomiting : twice last week, feels she's been sick with a cold in the last week and feels this is the reason she was throwing. Doesn't typically vomit.     Dysphagia:  none     Pregnancy: not interested currently, not sexually active        Weight History:      5/18/2021     1:08 PM   --   What is your highest lifetime weight? 240   What is your lowest weight since surgery? (In pounds) 210     Initial Weight (lbs): 224 lbs  Weight: 68.6 kg (151 lb 3.2 oz)  Last Visits Weight: 68.5 kg (151 lb)  Cumulative weight loss (lbs): 72.8  Weight Loss Percentage: 32.5%        5/18/2021     1:08 PM   Questions Regarding Co-Morbidities and Health Concerns Reviewed With Patient   Pre-diabetes Never   Diabetes II Never   High Blood Pressure Never   High cholesterol Never   Heartburn/Reflux Never   Sleep apnea Never   PCOS Never   Back pain Stayed the same   Joint pain Stayed the same   Lower leg swelling Improved           5/18/2021     1:08 PM   Eating Habits   How many meals do you eat per day? 3   Do you snack between meals? No   How much food are you eating at each meal? Less than 1/2 cup   Are you able to separate your meals and liquids by at least 30 minutes? No   Are you able to avoid liquid calories? Yes           5/18/2021     1:08 PM   Exercise Questions Reviewed With Patient   How often do you exercise? Never   What keeps you from being more active? Pain       Social History:       No data to display           "      Medications:  Current Outpatient Medications   Medication    omeprazole (PRILOSEC) 20 MG DR capsule    phentermine 15 MG capsule     No current facility-administered medications for this visit.          No data to display                ROS:  GI:        No data to display              Skin:        No data to display              Psych:        No data to display              Female Only:       5/18/2021     1:08 PM   STACIE CURRY ROS -    Female only Irregular menstrual cycles   Stress urinary incontinence No            No data to display                  PHYSICAL EXAM:  Objective    /86 (BP Location: Left arm, Patient Position: Sitting, Cuff Size: Adult Regular)   Pulse 85   Ht 1.6 m (5' 3\")   Wt 68.6 kg (151 lb 3.2 oz)   SpO2 98%   BMI 26.78 kg/m    /86 (BP Location: Left arm, Patient Position: Sitting, Cuff Size: Adult Regular)   Pulse 85   Ht 1.6 m (5' 3\")   Wt 68.6 kg (151 lb 3.2 oz)   SpO2 98%   BMI 26.78 kg/m    Body mass index is 26.78 kg/m .  Physical Exam   GENERAL: alert and no distress  EYES: Eyes grossly normal to inspection.  No discharge or erythema, or obvious scleral/conjunctival abnormalities.  RESP: No audible wheeze, cough, or visible cyanosis.    SKIN: Visible skin clear. No significant rash, abnormal pigmentation or lesions.  NEURO: Cranial nerves grossly intact.  Mentation and speech appropriate for age.  PSYCH: Appropriate affect, tone, and pace of words          Sincerely,    Josselin Clifford PA-C      "

## 2024-03-01 NOTE — LETTER
3/1/2024       RE: Karina Rice  [address]  [place] [address]     Dear Colleague,    Thank you for referring your patient, Karina Rice, to the [facility] [place] [facility]. Please see a copy of my visit note below.    Return Bariatric Surgery Note    RE: Karina Rice  MR#: [id number]  : 1985  VISIT DATE: Mar 1, 2024      Dear Clinic, [facility],    I had the pleasure of seeing your patient, Karina Rice, in my post-bariatric surgery assessment clinic.    Assessment & Plan   Problem List Items Addressed This Visit       Overweight (BMI 25.0-29.9) - Primary    Relevant Medications    phentermine 15 MG capsule    omeprazole (PRILOSEC) 20 MG DR capsule    Other Relevant Orders    CBC with platelets    Comprehensive metabolic panel    Hemoglobin A1c    Lipid panel reflex to direct LDL Fasting    Parathyroid Hormone Intact    Vitamin B12    Vitamin A    Vitamin D Deficiency    Ferritin    S/P laparoscopic sleeve gastrectomy    Relevant Medications    phentermine 15 MG capsule    Other Relevant Orders    CBC with platelets    Comprehensive metabolic panel    Hemoglobin A1c    Lipid panel reflex to direct LDL Fasting    Parathyroid Hormone Intact    Vitamin B12    Vitamin A    Vitamin D Deficiency    Ferritin    Vitamin D deficiency    Relevant Orders    CBC with platelets    Comprehensive metabolic panel    Hemoglobin A1c    Lipid panel reflex to direct LDL Fasting    Parathyroid Hormone Intact    Vitamin B12    Vitamin A    Vitamin D Deficiency     Other Visit Diagnoses       History of obesity        Relevant Medications    phentermine 15 MG capsule    omeprazole (PRILOSEC) 20 MG DR capsule    Other Relevant Orders    CBC with platelets    Comprehensive metabolic panel    Hemoglobin A1c    Lipid panel reflex to direct LDL Fasting    Parathyroid Hormone Intact    Vitamin B12     Vitamin A    Vitamin D Deficiency    Ferritin    Gastroesophageal reflux disease, unspecified whether esophagitis present        Relevant Medications    omeprazole (PRILOSEC) 20 MG DR capsule               52 minutes spent by me on the date of the encounter doing chart review, history and exam, documentation and further activities per the note    CHIEF COMPLAINT: Post-bariatric surgery follow-up. 3  years s/p Laparoscopic sleeve gastrectomy    with Dr. Keenan .       HISTORY OF PRESENT ILLNESS:      5/18/2021     1:08 PM   Questions Regarding Prior Weight Loss Surgery Reviewed With Patient   I had the following weight loss procedure Sleeve Gastrectomy   What year was your surgery? 2021   How has your weight changed since your last visit? I have lost weight   Are you currently taking any weight loss medications? No   Do you currently have any of the following None of the above   ER since last visit No   Hospital since last visit No   Do you have any concerns today? none         Plan  Restart omeprazole 20mg for heart burn symptoms    Restart phentermine 15mg daily- check your blood pressure after taking for 2 weeks   Goals we discussed today:   Increase protein 20-30g per meal   Increase water, goal 64oz daily   Go to gym 2x a week   Labs ordered today: post op labs + ferritin due to recent worsened fatigue   Follow up with Josselin or Angela Randall in 3 months   Dietician appointment to be scheduled asap   Referral placed for plastic surgery for removal of excess abdominal skin    Referral placed with primary care [place]   Keep up the excellent work!     Anti-obesity medication started today for this patient   PHENTERMINE  No history of hypertension  No history of CVA   No history of cardiovascular disease   No history of cardiac arrhythmias   No history of glaucoma  No history of drug abuse  .       Interval History:   Last seen by Angela Llamas 1 year ago,  "4/17/2023  [place] weight of 138lbs, and was able to maintain weight for 1 years. Weight regain has been gradual due to increase hunger. Feels like she is hungry \"every 10 minutes\".     Will start Phentermine today to help with hunger. Anxiety is well controlled without medications. She will monitor for any increase symptoms. Previous reaction of oxymetazoline with itching, will monitor. No hx of HTN. No contraindications. Discussed side effects, risks, and benefits.        [place] weight post surgery - 138lbs.     Since last visit:   Took phentermine for several months, saw improvement in hunger levels, was unable to get refills due to lack of follow up. Discussed the importance of regular follow up to continue taking these medications.     Weight is overall stable compared to previous appointment back in April 2023, however between these appointments she had lost down to 140lbs  and then saw 11 pound weight gain over 2 months which was very stressful for her.     Lately has felt that she's hungry throughout the day, has noticed worsened fatigue.     Tried to get in to see plastic surgery to discuss excess skin removal (and dealing with rashes under skin folds) , was cleared for panniculectomy but unable to get a surgery date due to lack of communication and follow up from surgeon. Requesting new referral to meet with a different plastic surgeon today.     Had plans to go to [place] for several months and this was in part a reason why she was not able to schedule her panniculectomy, but this trip has for now been canceled.     Anti-obesity medication history:     Current:   none    Past:  Phentermine 15mg- felt good with this, no side effects. Helped decrease hunger, hoping to restart today .     Recent diet changes: increased carbohydrates lately (dougnuts, bread). Drinks coke zero.   Eating 2-3 meals per day, typically half a plate per meal.   Breakfast: eggs, with cheese, toast . Coffee with zero sugar creamer/ "   Lunch: rice, macaroni, with meat and vegetables   Dinner: sandwich with fish, meat   After dinner snack: chips, salty foods, candy, doughnuts   Has been eating in the middle of the night 3x a week    -Protein: meat or eggs at every meal   -Water: doesn't drink much water. Generally drinking coffee and zero sugar soda.     Recent exercise/activity changes: hasn't been exercising for the last 2 months. Struggles to walk outdoors due to safety concerns, lives in [facility]. Plans to go to the gym more frequently.    Recent stressors: in between jobs, not currently working.     Recent sleep changes: sleeps more lately     Vitamins/Labs: due for 1 year labs, not currently taking any vitamins     GERD symptoms: heart burn every day lately, has not been taking heart burn medications. Open to starting omeprazole 20mg today      Nausea : occasional     Vomiting : twice last week, feels she's been sick with a cold in the last week and feels this is the reason she was throwing. Doesn't typically vomit.     Dysphagia:  none     Pregnancy: not interested currently, not sexually active        Weight History:      5/18/2021     1:08 PM   --   What is your highest lifetime weight? 240   What is your lowest weight since surgery? (In pounds) 210     Initial Weight (lbs): 224 lbs  Weight: 68.6 kg (151 lb 3.2 oz)  Last Visits Weight: 68.5 kg (151 lb)  Cumulative weight loss (lbs): 72.8  Weight Loss Percentage: 32.5%        5/18/2021     1:08 PM   Questions Regarding Co-Morbidities and Health Concerns Reviewed With Patient   Pre-diabetes Never   Diabetes II Never   High Blood Pressure Never   High cholesterol Never   Heartburn/Reflux Never   Sleep apnea Never   PCOS Never   Back pain Stayed the same   Joint pain Stayed the same   Lower leg swelling Improved           5/18/2021     1:08 PM   Eating Habits   How many meals do you eat per day? 3   Do you snack between meals? No   How much food are you eating at each meal? Less  "than 1/2 cup   Are you able to separate your meals and liquids by at least 30 minutes? No   Are you able to avoid liquid calories? Yes           5/18/2021     1:08 PM   Exercise Questions Reviewed With Patient   How often do you exercise? Never   What keeps you from being more active? Pain       Social History:       No data to display                Medications:  Current Outpatient Medications   Medication    omeprazole (PRILOSEC) 20 MG DR capsule    phentermine 15 MG capsule     No current facility-administered medications for this visit.          No data to display                ROS:  GI:        No data to display              Skin:        No data to display              Psych:        No data to display              Female Only:       5/18/2021     1:08 PM   BAR RBS ROS -    Female only Irregular menstrual cycles   Stress urinary incontinence No            No data to display                  PHYSICAL EXAM:  Objective    /86 (BP Location: Left arm, Patient Position: Sitting, Cuff Size: Adult Regular)   Pulse 85   Ht 1.6 m (5' 3\")   Wt 68.6 kg (151 lb 3.2 oz)   SpO2 98%   BMI 26.78 kg/m    /86 (BP Location: Left arm, Patient Position: Sitting, Cuff Size: Adult Regular)   Pulse 85   Ht 1.6 m (5' 3\")   Wt 68.6 kg (151 lb 3.2 oz)   SpO2 98%   BMI 26.78 kg/m    Body mass index is 26.78 kg/m .  Physical Exam   GENERAL: alert and no distress  EYES: Eyes grossly normal to inspection.  No discharge or erythema, or obvious scleral/conjunctival abnormalities.  RESP: No audible wheeze, cough, or visible cyanosis.    SKIN: Visible skin clear. No significant rash, abnormal pigmentation or lesions.  NEURO: Cranial nerves grossly intact.  Mentation and speech appropriate for age.  PSYCH: Appropriate affect, tone, and pace of words          Sincerely,    Josselin Clifford PA-C    "

## 2024-03-01 NOTE — TELEPHONE ENCOUNTER
[facility] Prior Authorization Team Request    Medication: Phentermine 15 mg Capsules  Dosing: Take one capsule by mouth every morning  Qty: 30  Day Supply: 30  NDC (required for Medicaid members): [id number]     Insurance   BIN: [id number]  PCN:   Grp:   ID: [id number]    CoverMyMeds Key (if applicable):     Additional documentation:       Filling Pharmacy: [facility]  Phone Number: [phone]  Contact:  Augusto Hernandez NPI (required for Medicaid members):   [id number]

## 2024-03-02 LAB
VIT B12 SERPL-MCNC: 827 PG/ML (ref 232–1245)
VIT D+METAB SERPL-MCNC: 21 NG/ML (ref 20–50)

## 2024-03-04 LAB
ANNOTATION COMMENT IMP: NORMAL
RETINYL PALMITATE SERPL-MCNC: 0.03 MG/L
VIT A SERPL-MCNC: 0.35 MG/L

## 2024-03-04 NOTE — RESULT ENCOUNTER NOTE
Hi Karina,  Your parathyroid hormone is still slightly elevated and your vitamin D is on the low end of normal, which is suggestive of not getting enough vitamin D supplementation. I'd recommend starting a vitamin D supplement of 2,000iu.   Additionally your hemoglobin is slightly low and your ferritin is on the low side of normal, suggesting a minor iron deficiency anemia. I'd like you to start an iron supplement, you should be getting 45- 60gm per day.   All other labs normal.     Josselin Clifford PA-C

## 2024-03-08 ENCOUNTER — TELEPHONE (OUTPATIENT)
Dept: ENDOCRINOLOGY | Facility: CLINIC | Age: 39
End: 2024-03-08
Payer: MEDICAID

## 2024-03-08 NOTE — TELEPHONE ENCOUNTER
Attempted to call patient with lab results. Call back information left on voicemail. Will send Between message.

## 2024-03-14 NOTE — TELEPHONE ENCOUNTER
PA Initiation    Medication: PHENTERMINE HCL 15 MG PO CAPS  Insurance Company: [place] Medicaid ([facility]) - Phone [phone] Fax [phone]  Pharmacy Filling the Rx: [facility] [place], [place] - [address] [place] [address] 3-080  Filling Pharmacy Phone: [phone]  Filling Pharmacy Fax:    Start Date: 3/14/2024

## 2024-03-17 NOTE — TELEPHONE ENCOUNTER
PRIOR AUTHORIZATION DENIED    Medication: PHENTERMINE HCL 15 MG PO CAPS  Insurance Company: [place] Medicaid ([facility]) - Phone [phone] Fax [phone]  Denial Date: 3/17/2024  Denial Reason(s): Patient does not meet criteria for coverage      Appeal Information: N/A  Patient Notified: No

## 2024-06-20 ENCOUNTER — TELEPHONE (OUTPATIENT)
Dept: PLASTIC SURGERY | Facility: CLINIC | Age: 39
End: 2024-06-20
Payer: COMMERCIAL

## 2024-06-20 NOTE — TELEPHONE ENCOUNTER
Attempted to complete plastic surgery consult screening, LVM with call-back number via [employer] .    Cristofer Oropeza, EMT

## 2024-07-12 ENCOUNTER — OFFICE VISIT (OUTPATIENT)
Dept: SURGERY | Facility: CLINIC | Age: 39
End: 2024-07-12
Payer: COMMERCIAL

## 2024-07-12 VITALS — WEIGHT: 148.9 LBS | BODY MASS INDEX: 26.38 KG/M2 | HEIGHT: 63 IN

## 2024-07-12 DIAGNOSIS — Z98.84 S/P LAPAROSCOPIC SLEEVE GASTRECTOMY: ICD-10-CM

## 2024-07-12 DIAGNOSIS — E65 ABDOMINAL PANNUS: ICD-10-CM

## 2024-07-12 DIAGNOSIS — L98.7 EXCESS SKIN OF ABDOMEN: ICD-10-CM

## 2024-07-12 DIAGNOSIS — Z86.39 HISTORY OF OBESITY: ICD-10-CM

## 2024-07-12 PROCEDURE — 99215 OFFICE O/P EST HI 40 MIN: CPT | Performed by: PLASTIC SURGERY

## 2024-07-12 NOTE — LETTER
2024      Karina Rice  [address]  [facility] [address]      Dear Colleague,    Thank you for referring your patient, Karina Rice, to the [facility]. Please see a copy of my visit note below.    Referring Provider:  Josselin Clifford PA-C  [address]  [address] [place] [address]     Primary Care Provider:  [facility]      RE: Karina Rice.  : 1985.  DON: 2024.    Reason for visit: Abdominal contouring surgery    HPI: The patient is unhappy with the excess tissue in her lower abdomen that is causing her rashes.  She is lost a significant amount of weight after undergoing gastric sleeve procedure 3 years ago she.  She went from 245 pounds down to about 140 pounds.  She is currently about 150 pounds.  She has been stable for multiple months now.  She has been trying over-the-counter and prescribed medications to help with the rashes.  Her work in a factory causes exacerbation of the rashes.  Here to have a abdominal contouring procedure done.    Medical history:  No past medical history on file.    Surgical history:  Past Surgical History:   Procedure Laterality Date     LAPAROSCOPIC GASTRIC SLEEVE N/A 2021    Procedure: GASTRECTOMY, SLEEVE, LAPAROSCOPIC;  Surgeon: Augusto Keenan MD;  Location:  OR       Family history:  No family history on file.    Medications:  Current Outpatient Medications   Medication Sig Dispense Refill     omeprazole (PRILOSEC) 20 MG DR capsule Take 1 capsule (20 mg) by mouth daily 30 capsule 3     phentermine 15 MG capsule Take 1 capsule (15 mg) by mouth every morning (Patient not taking: Reported on 2024) 30 capsule 3       Allergies:  Allergies   Allergen Reactions     Compazine [Prochlorperazine] Difficulty breathing     Toradol [Ketorolac] Difficulty breathing     Patient had IV Toradol in ED and states she passed out.  Had difficulty breathing when she woke up.      "Oxymetazoline Itching       Social history:   Social History     Tobacco Use     Smoking status: Never     Smokeless tobacco: Never   Substance Use Topics     Alcohol use: Not on file         Physical Examination:  Ht 1.6 m (5' 3\")   Wt 67.5 kg (148 lb 14.4 oz)   BMI 26.38 kg/m    Body mass index is 26.38 kg/m .    General: No acute distress.    ABDOMEN: Laparoscopic scars that are well-healed.  No hernias.  Lower abdominal panniculus grade 3.  It hangs below the pubic symphysis.  She has upper abdominal excess tissue without folds.  Minimal looseness to the muscular layer.        ASSESMENT and PLAN:    Based on above findings, a diagnosis of massive weight loss with symptomatic excess skin of the abdomen.  I had a darron, detailed discussion with the patient in the presence of my nurse (who was present from beginning to end) about the proposed surgery. I was very clear and detailed about overview of surgery, perioperative plans, and expectations. I showed the patient where the scars would be on the patient's body and with pictures, and what the concepts of the procedure. The major differences of the different types of abdominal contouring procedure (panniculectomy and Abdominoplasty) were explained in detail including the scars, umbilical position/presence/relocation/loss, area of the abdomen affected, and aesthetic outcome.  All risks, benefits and alternatives of the surgery including but not limited to pain, infection, bleeding, scarring, asymmetry, seromas, hematomas, wound breakdown, wound dehiscence, numbness, nerve and vessel injury, prominent scar, hypertrophic scar, keloid scar, inability to remove all of the excess tissue, reloosening over time, injury to deeper structures, swelling of the mons, DVT, PE, MI, CVA, pneumonia, renal failure and death. The patient agreed that they understood them all and had all their questions answered to their satisfaction. The realities of insurance companies and " requirement of prior authorization were also explained. We will send the quotes for the procedure(s) if not covered if they want as discussed.  We will get prior authorization for the procedure(s) and then proceed as indicated.      She cannot afford private pay.  Therefore we will proceed with a panniculectomy as instructed by the patient.  She understands that her umbilicus will be lowered.  This is not a fully aesthetic procedure.       All questions were answered. The patient was happy with the visit. I look forward to helping the patient out in the near future as indicated.       Total time spent in the encounter today including chart review, visit itself, and post-visit paperwork was 45 minutes.       Danica Mack MD    Chief, Division of Plastic Surgery  Department of Surgery  [facility]      CC: Josselin Clifford PA-C  [address] [place] [id number]  CC: Clinic, [facility]      Again, thank you for allowing me to participate in the care of your patient.        Sincerely,        HELENA Mack MD

## 2024-07-12 NOTE — NURSING NOTE
"Karina Rice's goals for this visit include:   Chief Complaint   Patient presents with    Consult     skin removal due to weight loss- follow up per pt PREVIOUS [facility] PT // per pt and PCP // no outside MR       She requests these members of her care team be copied on today's visit information:     PCP: Clinic, [facility]    Referring Provider:  Josselin Clifford PA-C  [address] [address]    Ht 1.6 m (5' 3\")   Wt 67.5 kg (148 lb 14.4 oz)   BMI 26.38 kg/m      Do you need any medication refills at today's visit?     Ghazal Jones MA on 7/12/2024 at 1:36 PM      "

## 2024-07-15 NOTE — PROGRESS NOTES
"Referring Provider:  Jsoselin Clifford PA-C  [address] [place] [address]     Primary Care Provider:  [facility]      RE: Karina Rice.  : 1985.  DON: 2024.    Reason for visit: Abdominal contouring surgery    HPI: The patient is unhappy with the excess tissue in her lower abdomen that is causing her rashes.  She is lost a significant amount of weight after undergoing gastric sleeve procedure 3 years ago she.  She went from 245 pounds down to about 140 pounds.  She is currently about 150 pounds.  She has been stable for multiple months now.  She has been trying over-the-counter and prescribed medications to help with the rashes.  Her work in a factory causes exacerbation of the rashes.  Here to have a abdominal contouring procedure done.    Medical history:  No past medical history on file.    Surgical history:  Past Surgical History:   Procedure Laterality Date    LAPAROSCOPIC GASTRIC SLEEVE N/A 2021    Procedure: GASTRECTOMY, SLEEVE, LAPAROSCOPIC;  Surgeon: Augusto Keenan MD;  Location:  OR       Family history:  No family history on file.    Medications:  Current Outpatient Medications   Medication Sig Dispense Refill    omeprazole (PRILOSEC) 20 MG DR capsule Take 1 capsule (20 mg) by mouth daily 30 capsule 3    phentermine 15 MG capsule Take 1 capsule (15 mg) by mouth every morning (Patient not taking: Reported on 2024) 30 capsule 3       Allergies:  Allergies   Allergen Reactions    Compazine [Prochlorperazine] Difficulty breathing    Toradol [Ketorolac] Difficulty breathing     Patient had IV Toradol in ED and states she passed out.  Had difficulty breathing when she woke up.    Oxymetazoline Itching       Social history:   Social History     Tobacco Use    Smoking status: Never    Smokeless tobacco: Never   Substance Use Topics    Alcohol use: Not on file         Physical Examination:  Ht 1.6 m (5' 3\")   Wt 67.5 kg (148 lb 14.4 " oz)   BMI 26.38 kg/m    Body mass index is 26.38 kg/m .    General: No acute distress.    ABDOMEN: Laparoscopic scars that are well-healed.  No hernias.  Lower abdominal panniculus grade 3.  It hangs below the pubic symphysis.  She has upper abdominal excess tissue without folds.  Minimal looseness to the muscular layer.        ASSESMENT and PLAN:    Based on above findings, a diagnosis of massive weight loss with symptomatic excess skin of the abdomen.  I had a darron, detailed discussion with the patient in the presence of my nurse (who was present from beginning to end) about the proposed surgery. I was very clear and detailed about overview of surgery, perioperative plans, and expectations. I showed the patient where the scars would be on the patient's body and with pictures, and what the concepts of the procedure. The major differences of the different types of abdominal contouring procedure (panniculectomy and Abdominoplasty) were explained in detail including the scars, umbilical position/presence/relocation/loss, area of the abdomen affected, and aesthetic outcome.  All risks, benefits and alternatives of the surgery including but not limited to pain, infection, bleeding, scarring, asymmetry, seromas, hematomas, wound breakdown, wound dehiscence, numbness, nerve and vessel injury, prominent scar, hypertrophic scar, keloid scar, inability to remove all of the excess tissue, reloosening over time, injury to deeper structures, swelling of the mons, DVT, PE, MI, CVA, pneumonia, renal failure and death. The patient agreed that they understood them all and had all their questions answered to their satisfaction. The realities of insurance companies and requirement of prior authorization were also explained. We will send the quotes for the procedure(s) if not covered if they want as discussed.  We will get prior authorization for the procedure(s) and then proceed as indicated.      She cannot afford private pay.   Therefore we will proceed with a panniculectomy as instructed by the patient.  She understands that her umbilicus will be lowered.  This is not a fully aesthetic procedure.       All questions were answered. The patient was happy with the visit. I look forward to helping the patient out in the near future as indicated.       Total time spent in the encounter today including chart review, visit itself, and post-visit paperwork was 45 minutes.       Danica Mack MD    Chief, Division of Plastic Surgery  Department of Surgery  [facility]      CC: Josselin Clifford PA-C  [address] [place] [id number]  CC: Clinic, [facility]

## 2024-07-19 ENCOUNTER — TELEPHONE (OUTPATIENT)
Dept: PLASTIC SURGERY | Facility: CLINIC | Age: 39
End: 2024-07-19
Payer: COMMERCIAL

## 2024-07-19 PROBLEM — L98.7 EXCESS SKIN OF ABDOMEN: Status: ACTIVE | Noted: 2024-07-12

## 2024-07-19 PROBLEM — Z86.39 HISTORY OF OBESITY: Status: ACTIVE | Noted: 2024-07-12

## 2024-07-19 NOTE — TELEPHONE ENCOUNTER
Spoke with patient    Surgery is scheduled with Dr. Mack  Date: 8/29   first available surgery date  [facility]    H&P to be completed by: PAC clinic, scheduled on 8/14 in person     Surgical consult: 8/14    Post-op: 9/13 with Estefani Hanley PA-C    Patient will receive surgery arrival and start time from PAC. Patient is aware that if times change after they see PAC, they will receive a call from the pre-admission nurses 1-2 days prior to surgery with updated arrival time and NPO instructions.     Patient questions/concerns:  Patient initially asked if she is able to stay overnight after surgery, writer explained that this is an outpatient surgery and will be scheduled at our ambulatory surgery center, so she is unable to be admitted after. Explained that if she would like to request to be admitted after surgery, a discussion with Dr. Mack would be needed, as surgery would have to be scheduled at the hospital, and due to the surgery being an outpatient surgery, it is unknown if insurance would authorize this. Patient expressed understanding. Writer will inform RNCC of this, as writer is familiar with this patient and there questions regarding admission when patient was previously scheduled with a colleague. This may need to be reinforced to the patient.      Writer will also send message to central PA team, as patient informed Dr. Mack her insurance already approved surgery when previously scheduled. This was ~1 year ago, and writer will notify them of the new surgery date/provider/location so they can re-submit everything to insurance.     RN Care Coordinator: Audrey Edouard    Surgery packet: to be sent via US mail and via [employer]     Patient provided new mailing address, and writer updated this in her chart   __    Annette Johns on 7/19/2024 at 5:29 PM

## 2024-07-22 NOTE — TELEPHONE ENCOUNTER
FUTURE VISIT INFORMATION      SURGERY INFORMATION:  Date: 24  Location: [facility] OR  Surgeon:  HELENA Mack MD   Anesthesia Type:  general with block  Procedure: PANNICULECTOMY   Consult: ov 24    RECORDS REQUESTED FROM:       Primary Care Provider: [facility]    Most recent EKG+ Tracin23- [facility]

## 2024-08-14 ENCOUNTER — OFFICE VISIT (OUTPATIENT)
Dept: SURGERY | Facility: CLINIC | Age: 39
End: 2024-08-14
Payer: COMMERCIAL

## 2024-08-14 ENCOUNTER — PRE VISIT (OUTPATIENT)
Dept: SURGERY | Facility: CLINIC | Age: 39
End: 2024-08-14

## 2024-08-14 ENCOUNTER — ANESTHESIA EVENT (OUTPATIENT)
Dept: SURGERY | Facility: AMBULATORY SURGERY CENTER | Age: 39
End: 2024-08-14
Payer: COMMERCIAL

## 2024-08-14 ENCOUNTER — LAB (OUTPATIENT)
Dept: LAB | Facility: CLINIC | Age: 39
End: 2024-08-14
Payer: COMMERCIAL

## 2024-08-14 ENCOUNTER — OFFICE VISIT (OUTPATIENT)
Dept: PLASTIC SURGERY | Facility: CLINIC | Age: 39
End: 2024-08-14
Payer: COMMERCIAL

## 2024-08-14 VITALS
SYSTOLIC BLOOD PRESSURE: 119 MMHG | HEART RATE: 82 BPM | WEIGHT: 148 LBS | BODY MASS INDEX: 26.22 KG/M2 | DIASTOLIC BLOOD PRESSURE: 84 MMHG | OXYGEN SATURATION: 100 % | HEIGHT: 63 IN

## 2024-08-14 VITALS
RESPIRATION RATE: 16 BRPM | TEMPERATURE: 98.4 F | BODY MASS INDEX: 26.22 KG/M2 | SYSTOLIC BLOOD PRESSURE: 119 MMHG | WEIGHT: 148 LBS | HEIGHT: 63 IN | DIASTOLIC BLOOD PRESSURE: 84 MMHG | HEART RATE: 82 BPM | OXYGEN SATURATION: 100 %

## 2024-08-14 DIAGNOSIS — Z01.818 PRE-OP EVALUATION: ICD-10-CM

## 2024-08-14 DIAGNOSIS — E65 ABDOMINAL PANNUS: Primary | ICD-10-CM

## 2024-08-14 DIAGNOSIS — Z01.818 PRE-OP EVALUATION: Primary | ICD-10-CM

## 2024-08-14 LAB
ERYTHROCYTE [DISTWIDTH] IN BLOOD BY AUTOMATED COUNT: 13.3 % (ref 10–15)
HCT VFR BLD AUTO: 34.6 % (ref 35–47)
HGB BLD-MCNC: 11 G/DL (ref 11.7–15.7)
MCH RBC QN AUTO: 23.8 PG (ref 26.5–33)
MCHC RBC AUTO-ENTMCNC: 31.8 G/DL (ref 31.5–36.5)
MCV RBC AUTO: 75 FL (ref 78–100)
PLATELET # BLD AUTO: 256 10E3/UL (ref 150–450)
RBC # BLD AUTO: 4.63 10E6/UL (ref 3.8–5.2)
WBC # BLD AUTO: 5.3 10E3/UL (ref 4–11)

## 2024-08-14 PROCEDURE — 36415 COLL VENOUS BLD VENIPUNCTURE: CPT | Performed by: PATHOLOGY

## 2024-08-14 PROCEDURE — 99214 OFFICE O/P EST MOD 30 MIN: CPT | Performed by: PLASTIC SURGERY

## 2024-08-14 PROCEDURE — 85027 COMPLETE CBC AUTOMATED: CPT | Performed by: PATHOLOGY

## 2024-08-14 PROCEDURE — 99203 OFFICE O/P NEW LOW 30 MIN: CPT | Performed by: NURSE PRACTITIONER

## 2024-08-14 RX ORDER — VITAMIN B COMPLEX
TABLET ORAL DAILY
COMMUNITY

## 2024-08-14 ASSESSMENT — ENCOUNTER SYMPTOMS: ORTHOPNEA: 0

## 2024-08-14 ASSESSMENT — PAIN SCALES - GENERAL
PAINLEVEL: NO PAIN (0)
PAINLEVEL: NO PAIN (1)

## 2024-08-14 ASSESSMENT — LIFESTYLE VARIABLES: TOBACCO_USE: 0

## 2024-08-14 NOTE — H&P
Pre-Operative H & P     CC:  Preoperative exam to assess for increased cardiopulmonary risk while undergoing surgery and anesthesia.    Date of Encounter: 8/14/2024  Primary Care Physician:  Daniel [facility]     Reason for visit:   Encounter Diagnosis   Name Primary?    Pre-op evaluation Yes       HPI  Karina Rice is a 39 year old female who presents for pre-operative H & P in preparation for  Procedure Information       Case: [id number] Date/Time: 08/29/24 1205    Procedure: PANNICULECTOMY (Abdomen)    Anesthesia type: General with Block    Diagnosis:       History of obesity [Z86.39]      Excess skin of abdomen [L98.7]      Abdominal pannus [E65]    Pre-op diagnosis:       History of obesity [Z86.39]      Excess skin of abdomen [L98.7]      Abdominal pannus [E65]    Location: [place] [address] / [facility]    Providers: HELENA Mack MD            Patient is being evaluated today for the following comorbid conditions: Fatty Liver  Hx of Obesity s/p lap sleeve gastrectomy, anxiety, depression, GERD, anemia and deviated septum.       She is lost a significant amount of weight after undergoing gastric sleeve procedure 3 years ago she.  She went from 245 pounds down to about 140 pounds.  She is currently about 150 pounds.  She met with  regarding her  lower abdominal panniculus grade 3, That hangs below her pubic symphysis, with excess tissue folds that she  has been trying over-the-counter and prescribed medications to help with the rashes.  Her work in a factory causes exacerbation of the rashes. She presents today in preparation for the above recommended procedure.     History is obtained from the patient and chart review    Hx of abnormal bleeding or anti-platelet use: denies    Menstrual history: Patient's last menstrual period was 07/21/2024 (approximate).:      Past Medical History  No past medical history on file.    Past  Surgical History  Past Surgical History:   Procedure Laterality Date    LAPAROSCOPIC GASTRIC SLEEVE N/A 5/11/2021    Procedure: GASTRECTOMY, SLEEVE, LAPAROSCOPIC;  Surgeon: Augusto Keenan MD;  Location: [facility] OR       Prior to Admission Medications  Current Outpatient Medications   Medication Sig Dispense Refill    Cyanocobalamin (B-12) 1000 MCG TBCR       Vitamin D3 (CHOLECALCIFEROL) 25 mcg (1000 units) tablet Take by mouth daily      omeprazole (PRILOSEC) 20 MG DR capsule Take 1 capsule (20 mg) by mouth daily (Patient not taking: Reported on 8/14/2024) 30 capsule 3    phentermine 15 MG capsule Take 1 capsule (15 mg) by mouth every morning (Patient not taking: Reported on 7/12/2024) 30 capsule 3       Allergies  Allergies   Allergen Reactions    Compazine [Prochlorperazine] Difficulty breathing    Toradol [Ketorolac] Difficulty breathing     Patient had IV Toradol in ED and states she passed out.  Had difficulty breathing when she woke up.    Oxymetazoline Itching       Social History  Social History     Socioeconomic History    Marital status:      Spouse name: Not on file    Number of children: Not on file    Years of education: Not on file    Highest education level: Not on file   Occupational History    Not on file   Tobacco Use    Smoking status: Never    Smokeless tobacco: Never   Substance and Sexual Activity    Alcohol use: Never    Drug use: Never    Sexual activity: Not on file   Other Topics Concern    Not on file   Social History Narrative    Not on file     Social Determinants of Health     Financial Resource Strain: Not on file   Food Insecurity: Not on file   Transportation Needs: Not on file   Physical Activity: Not on file   Stress: Not on file   Social Connections: Not on file   Interpersonal Safety: Not on file   Housing Stability: Not on file       Family History  No family history on file.    Review of Systems  The complete review of systems is negative other than noted in the HPI  "or here.   Anesthesia Evaluation   Pt has had prior anesthetic. Type: General and MAC.    History of anesthetic complications  - .  was told she had an allergic reaction to zofran- IV -.    ROS/MED HX  ENT/Pulmonary:  - neg pulmonary ROS  (-) tobacco use and asthma   Neurologic:  - neg neurologic ROS     Cardiovascular:    (-) taking anticoagulants/antiplatelets, NUNO and orthopnea/PND   METS/Exercise Tolerance: >4 METS    Hematologic:     (+)      anemia,          Musculoskeletal:  - neg musculoskeletal ROS     GI/Hepatic: Comment: Fatty Liver  Hx of Obesity s/p lap sleeve gastrectomy   Abdominal pannus    (+) GERD, Asymptomatic on medication,           liver disease,       Renal/Genitourinary:       Endo:  - neg endo ROS     Psychiatric/Substance Use:     (+) psychiatric history anxiety and depression    (-) alcohol abuse history   Infectious Disease:  - neg infectious disease ROS     Malignancy:  - neg malignancy ROS     Other: Comment: Deviated septum.            /84 (BP Location: Right arm, Patient Position: Sitting, Cuff Size: Adult Regular)   Pulse 82   Temp 98.4  F (36.9  C) (Oral)   Resp 16   Ht 1.6 m (5' 3\")   Wt 67.1 kg (148 lb)   LMP 07/21/2024 (Approximate)   SpO2 100%   Breastfeeding No   BMI 26.22 kg/m      Physical Exam   Constitutional: Awake, alert, cooperative, no apparent distress, and appears stated age.  Eyes: Pupils equal, round and reactive to light, extra ocular muscles intact, sclera clear, conjunctiva normal.  HENT: Normocephalic, oral pharynx with moist mucus membranes, good dentition. No goiter appreciated.   Respiratory: Clear to auscultation bilaterally, no crackles or wheezing.  Cardiovascular: Regular rate and rhythm, normal S1 and S2, and no murmur noted.  Carotids +2, no bruits. No edema. Palpable pulses to radial  DP and PT arteries.   GI: Normal bowel sounds, soft, non-distended, non-tender, no masses palpated, no hepatosplenomegaly.  Surgical scars: " "healed  Lymph/Hematologic: No cervical lymphadenopathy and no supraclavicular lymphadenopathy.  Genitourinary:  deferred  Skin: Warm and dry.  No rashes at anticipated surgical site.   Musculoskeletal: Full ROM of neck. There is no redness, warmth, or swelling of the joints. Gross motor strength is normal.    Neurologic: Awake, alert, oriented to name, place and time. Cranial nerves II-XII are grossly intact. Gait is normal.   Neuropsychiatric: Calm, cooperative. Normal affect.     Prior Labs/Diagnostic Studies   All labs and imaging personally reviewed   Lab Results   Component Value Date    WBC 6.6 03/01/2024    WBC 6.9 05/18/2021     Lab Results   Component Value Date    RBC 4.69 03/01/2024    RBC 5.11 05/18/2021     Lab Results   Component Value Date    HGB 11.6 03/01/2024    HGB 13.2 05/18/2021     Lab Results   Component Value Date    HCT 35.9 03/01/2024    HCT 40.1 05/18/2021     No components found for: \"MCT\"  Lab Results   Component Value Date    MCV 77 03/01/2024    MCV 79 05/18/2021     Lab Results   Component Value Date    MCH 24.7 03/01/2024    MCH 25.8 05/18/2021     Lab Results   Component Value Date    MCHC 32.3 03/01/2024    MCHC 32.9 05/18/2021     Lab Results   Component Value Date    RDW 13.1 03/01/2024    RDW 12.1 05/18/2021     Lab Results   Component Value Date     03/01/2024     05/18/2021     Last Comprehensive Metabolic Panel:  Lab Results   Component Value Date     03/01/2024    POTASSIUM 3.5 03/01/2024    CHLORIDE 100 03/01/2024    CO2 26 03/01/2024    ANIONGAP 13 03/01/2024    GLC 82 03/01/2024    BUN 8.5 03/01/2024    CR 0.59 03/01/2024    GFRESTIMATED >90 03/01/2024    MICHAEL 9.9 03/01/2024       Lab Results   Component Value Date    AST 27 03/01/2024    AST 45 05/18/2021     Lab Results   Component Value Date    ALT 16 03/01/2024    ALT 90 05/18/2021     No results found for: \"BILICONJ\"   Lab Results   Component Value Date    BILITOTAL 0.4 03/01/2024    BILITOTAL 0.8 " "05/18/2021     Lab Results   Component Value Date    ALBUMIN 4.8 03/01/2024    ALBUMIN 4.0 05/18/2021     Lab Results   Component Value Date    PROTTOTAL 7.9 03/01/2024    PROTTOTAL 8.2 05/18/2021      Lab Results   Component Value Date    ALKPHOS 61 03/01/2024    ALKPHOS 87 05/18/2021         EKG/ stress test - if available please see in ROS above   No results found.    The patient's records and results personally reviewed by this provider.     Outside records reviewed from: Care Everywhere    LAB/DIAGNOSTIC STUDIES TODAY:    Lab Results   Component Value Date    WBC 5.3 08/14/2024    WBC 6.9 05/18/2021     Lab Results   Component Value Date    RBC 4.63 08/14/2024    RBC 5.11 05/18/2021     Lab Results   Component Value Date    HGB 11.0 08/14/2024    HGB 13.2 05/18/2021     Lab Results   Component Value Date    HCT 34.6 08/14/2024    HCT 40.1 05/18/2021     No components found for: \"MCT\"  Lab Results   Component Value Date    MCV 75 08/14/2024    MCV 79 05/18/2021     Lab Results   Component Value Date    MCH 23.8 08/14/2024    MCH 25.8 05/18/2021     Lab Results   Component Value Date    MCHC 31.8 08/14/2024    MCHC 32.9 05/18/2021     Lab Results   Component Value Date    RDW 13.3 08/14/2024    RDW 12.1 05/18/2021     Lab Results   Component Value Date     08/14/2024     05/18/2021       Component  Ref Range & Units 5 mo ago  (3/1/24) 1 yr ago  (4/17/23) 3 yr ago  (5/18/21) 3 yr ago  (5/12/21)     WBC Count  4.0 - 11.0 10e3/uL 6.6 5.7 6.9 R     RBC Count  3.80 - 5.20 10e6/uL 4.69 4.68 5.11 R     Hemoglobin  11.7 - 15.7 g/dL 11.6 Low  12.3 13.2     Hematocrit  35.0 - 47.0 % 35.9 37.6 40.1     MCV  78 - 100 fL 77 Low  80 79 R     MCH  26.5 - 33.0 pg 24.7 Low  26.3 Low  25.8 Low      MCHC  31.5 - 36.5 g/dL 32.3 32.7 32.9     RDW  10.0 - 15.0 % 13.1 11.9 12.1     Platelet Count  150 - 450 10e3/uL 288 268 316 R 265 R   Resulting Agency [facility] LAB[facility]ATORY - CORE LAB [facility]ORATORY - CORE LAB [facility]" "[facility] MEDICA       Assessment    Karina Rice is a 39 year old female seen as a PAC referral for risk assessment and optimization for anesthesia.    Plan/Recommendations  Pt will be optimized for the proposed procedure.  See below for details on the assessment, risk, and preoperative recommendations    NEUROLOGY  - No history of TIA, CVA or seizure    -Post Op delirium risk factors:  No risk identified    ENT  - No current airway concerns.  Will need to be reassessed day of surgery.  Mallampati: III  TM: > 3    Patient reports she was told at an outside facility ( ED) she is allergic to IV Zofran- she was \" unable to move extremities, extreme dizziness for several days\"   She reports she is able to take oral Zofran       CARDIAC  - No history of CAD, Hypertension, and Afib  No anginal symptoms, Denies palpitations, PND, dizziness or syncope.     - METS (Metabolic Equivalents)  Patient performs 4 or more METS exercise without symptoms             Total Score: 0      RCRI-Very low risk: Class 1 0.4% complication rate             Total Score: 0        PULMONARY  Never smoked, denies SOB or any other pulmonary history.     PAOLA Low Risk             Total Score: 0      - Denies asthma or inhaler use  - Tobacco History    History   Smoking Status    Never   Smokeless Tobacco    Never       GI/Liver    Fatty Liver  Hx of Obesity s/p lap sleeve gastrectomy   Abdominal pannus- Procedure scheduled as above.           - GERD  Controlled on medications: Proton Pump Inhibitor  PONV High Risk  Total Score: 3           1 AN PONV: Pt is Female    1 AN PONV: Patient is not a current smoker    1 AN PONV: Intended Post Op Opioids        /RENAL  - Baseline Creatinine  WNL    ENDOCRINE    - BMI: Estimated body mass index is 26.22 kg/m  as calculated from the following:    Height as of this encounter: 1.6 m (5' 3\").    Weight as of this encounter: 67.1 kg (148 lb).  Overweight " (BMI 25.0-29.9)  - No history of Diabetes Mellitus    HEME  VTE Low Risk 0.26%             Total Score: 0      - No history of abnormal bleeding or antiplatelet use.  - mild iron deficient anemia  Recommend perioperative use of blood conservation techniques intraoperatively and close monitoring for postoperative bleeding.  Hgb 11.0  Reached out to her primary care who recommended iron supplements 5 months ago. However, the patient doesn't have my chart so she didn't receive the information. Asking PCP to call patient and start her supplements. Hold DOS    MSK  Patient is NOT Frail             Total Score: 0          PSYCH    Hx- PTSD  Anx/depression       Different anesthesia methods/types have been discussed with the patient, but they are aware that the final plan will be decided by the assigned anesthesia provider on the date of service.      The patient is optimized for their procedure. AVS with information on surgery time/arrival time, meds and NPO status given by nursing staff. No further diagnostic testing indicated.      On the day of service:     Prep time: 12 minutes  Visit time: 20 minutes  Documentation time: 10 minutes  ------------------------------------------  Total time: 42 minutes      SOLE Howard CNP  Preoperative Assessment Center  [facility]  Clinic and Surgery Center  Phone: [phone]  Fax: [phone]

## 2024-08-14 NOTE — LETTER
8/14/2024       RE: Karina Rice  [address]  [address]  [place] [address]     Dear Colleague,    Thank you for referring your patient, Karina Rice, to the [facility] [place] at [facility]. Please see a copy of my visit note below.    PREOPERATIVE VISIT NOTE     PRESENTING COMPLAINT:  Preop visit for upcoming insurance covered panniculectomy on 8/29/2024     HISTORY OF PRESENTING COMPLAINT: The patient is here for a pre-op visit for the patient's surgery.  The patient is being seen in the presence of my nurse. There is no change since the patient's consultation. Please see the consult note for details.      No change in her history and physical exam.  It is important to note that currently her insurance has stated that no prior authorization is needed.  This does not mean it will cover it.  This was discussed with the patient in detail.  She wants to attempt to change her insurance back to Medicare which she had previously and had been approved for the panniculectomy.  This needs to be sorted out.  She may end up needing private pay at the end if denied after the surgery and this was explained to her clearly.    Plan is to do a panniculectomy alone.  She understands her umbilicus will be lower than usual.  That this is not a aesthetic procedure.    ASSESSMENT AND PLAN:  Based upon the above findings, the patient is here for a preop visit for upcoming surgery.  I had a darron, detailed discussion with the patient in the presence of my nurse (who was present from beginning to end) about the proposed surgery. I was very clear and detailed about overview of surgery, perioperative plans, and expectations. All risks, benefits and alternatives of the surgery including but not limited to pain, infection, bleeding, scarring, asymmetry, seromas, hematomas, wound breakdown, wound dehiscence, prominent scar,  hypertrophic scar, keloid scar, requirement of further surgeries, skin/tissue necrosis, nerve/muscle/deeper structure injury, DVT, PE, MI, CVA, pneumonia, renal failure and death, were explained in detail. The patient agreed and understood them all and had all the questions answered to the patient's satisfaction, and the patient wants to proceed with surgery. I look forward to helping the patient out in the near future.  All questions were answered.  The patient was happy with the visit.    Total time spent in the encounter today including chart review, visit itself, and post-visit paperwork was 30 minutes.       Again, thank you for allowing me to participate in the care of your patient.      Sincerely,    HELENA Mack MD

## 2024-08-14 NOTE — PATIENT INSTRUCTIONS
Preparing for Your Surgery      Name:  Karina MALIK Aumraan   MRN:  [id number]   :  1985   Today's Date:  2024         Arriving for surgery:  Surgery date:  24  Arrival time:  10:45 am  Surgery time: 12:15 pm    Restrictions due to COVID 19:    Please maintain social distance.  Masking is optional        parking is available for anyone with mobility limitations or disabilities. (Monday- Friday 7 am- 5 pm)    Please come to:    [facility]  [address] [place] [id number]    Check in on the 5th floor, Ambulatory Surgery Center.    What can I eat or drink?    -  You may eat and drink normally until 8 hours before arrival time  (Until 2:45 am on 24)  -  You may have clear liquids until 2 hours before arrival time  (Until 8:45 am on 24)    Examples of clear liquids:  Water  Clear broth  Juices (apple, white grape, white cranberry  and cider) without pulp  Noncarbonated, powder based beverages  (lemonade and Tj-Aid)  Sodas (Sprite, 7-Up, ginger ale and seltzer)  Coffee or tea (without milk or cream)  Gatorade    --No alcohol or cannabis products for at least 24 hours before surgery    Which medicines can I take?    Hold Ibuprofen (Advil, Motrin) for 1 day before surgery--unless otherwise directed by surgeon.  Hold Naproxen (Aleve) for 4 days before surgery.    -  DO NOT take the following medications the day of surgery:   Vitamins     Phentermine needs to be held for 7 days before surgery. Your last dose can be 24      How do I prepare myself?  - Please take 2 showers (one the night prior to surgery and one the morning of surgery) using Scrubcare or Hibiclens soap.    Use this soap only from the neck to your toes.     Leave the soap on your skin for one minute--then rinse thoroughly.      You may use your own shampoo and conditioner; no other hair products.   - Please remove all jewelry and body piercings.  - No lotions, deodorants or fragrance.  -  No makeup or fingernail polish.   - Bring your ID and insurance card.    -If you have a Deep Brain Stimulator, a Spinal Cord Stimulator or any implanted Neuro device you must bring the remote to the Surgery Center          ALL PATIENTS ARE REQUIRED TO HAVE A RESPONSIBLE ADULT TO DRIVE AND BE IN ATTENDANCE WITH THEM FOR 24 HOURS FOLLOWING SURGERY       Covid testing policy as of 12/06/2022  Your surgeon will notify and schedule you for a COVID test if one is needed before surgery--please direct any questions or COVID symptoms to your surgeon      Questions or Concerns:    -For questions regarding the day of surgery please contact the Ambulatory Surgery Center at [phone].    -If you have health changes between today and your surgery please contact your surgeon.     For questions after surgery please call your surgeons office.

## 2024-08-14 NOTE — NURSING NOTE
"Chief Complaint   Patient presents with    RECHECK     Pre-op consult, DOS 8/29/24.       Vitals:    08/14/24 0948   BP: 119/84   BP Location: Left arm   Patient Position: Sitting   Cuff Size: Adult Regular   Pulse: 82   SpO2: 100%   Weight: 148 lb   Height: 5' 3\"       Body mass index is 26.22 kg/m .    Patient reports mild low back pain (1/10).    Cristofer Oropeza, EMT    "

## 2024-08-14 NOTE — PROGRESS NOTES
PREOPERATIVE VISIT NOTE     PRESENTING COMPLAINT:  Preop visit for upcoming insurance covered panniculectomy on 8/29/2024     HISTORY OF PRESENTING COMPLAINT: The patient is here for a pre-op visit for the patient's surgery.  The patient is being seen in the presence of my nurse. There is no change since the patient's consultation. Please see the consult note for details.      No change in her history and physical exam.  It is important to note that currently her insurance has stated that no prior authorization is needed.  This does not mean it will cover it.  This was discussed with the patient in detail.  She wants to attempt to change her insurance back to Medicare which she had previously and had been approved for the panniculectomy.  This needs to be sorted out.  She may end up needing private pay at the end if denied after the surgery and this was explained to her clearly.    Plan is to do a panniculectomy alone.  She understands her umbilicus will be lower than usual.  That this is not a aesthetic procedure.    ASSESSMENT AND PLAN:  Based upon the above findings, the patient is here for a preop visit for upcoming surgery.  I had a darron, detailed discussion with the patient in the presence of my nurse (who was present from beginning to end) about the proposed surgery. I was very clear and detailed about overview of surgery, perioperative plans, and expectations. All risks, benefits and alternatives of the surgery including but not limited to pain, infection, bleeding, scarring, asymmetry, seromas, hematomas, wound breakdown, wound dehiscence, prominent scar, hypertrophic scar, keloid scar, requirement of further surgeries, skin/tissue necrosis, nerve/muscle/deeper structure injury, DVT, PE, MI, CVA, pneumonia, renal failure and death, were explained in detail. The patient agreed and understood them all and had all the questions answered to the patient's satisfaction, and the patient wants to proceed with  surgery. I look forward to helping the patient out in the near future.  All questions were answered.  The patient was happy with the visit.    Total time spent in the encounter today including chart review, visit itself, and post-visit paperwork was 30 minutes.

## 2024-08-14 NOTE — NURSING NOTE
Pre Op Teaching Note:       Pre and Post op Patient Education    Relevant Diagnosis:  excess skin in abdomen  Surgical procedure:  panniculectomy    Patient demonstrates understanding of the following:  Date of surgery:  8/29/24  Location of surgery:  [facility] - 5th Floor  History and Physical and any other testing necessary prior to surgery: Yes, discussed with pt need for pre-op within 30 days of surgery with PCP.    Patient demonstrates understanding of the following:    Pre-op showering/scrub information with PCMX Soap: Yes, soap given per PAC visit today  Blood thinner medications discussed and when to stop (if applicable):  Per PCP at pre-op.     Post-op follow-up:  Discussed how to contact the hospital, nurse, and clinic scheduling staff if necessary. (See packet information)    Instructional materials used/given/mailed:  [place] [facility] Packet per surgery scheduler, post op teaching sheet, Soap.  Pt advised that final arrival information to come closer to the surgery date by [facility] nurses.

## 2024-08-15 NOTE — TELEPHONE ENCOUNTER
Left voicemail for patient regarding rescheduling surgery with Dr. Mack. Asked if patient would like to move surgery up to 8/27 from 8/29 with Dr. Mack. Waiting for a return call.    Provided contact number to discuss.    P: [phone]    __    Macy Edouard, on 8/15/2024 at 2:49 PM

## 2024-08-16 NOTE — TELEPHONE ENCOUNTER
Left voicemail for patient regarding rescheduling surgery with Dr. Mack. Asked if patient would like to move surgery up to 8/27 from 8/29 with Dr. Mack. Waiting for a return call.     Provided contact number to discuss.     P: [phone]    Macy Edouard on 8/16/2024 at 2:10 PM

## 2024-08-16 NOTE — TELEPHONE ENCOUNTER
Spoke with: Karina    Informed her that due to changes, we are unable to proceed with surgery on 8/29, but we are able to reschedule surgery to 8/27    Patient confirmed rescheduling surgery date    Date of Surgery: 8/27    Estimated Arrival time Discussed with Patient:   11:30 AM    Location of surgery: [facility]     No changes to appointments    H&P and consult completed already     Patient did inquire if lab results from PAC visit are normal, will defer to clinic    Annette Johns on 8/16/2024 at 3:59 PM

## 2024-08-26 NOTE — ANESTHESIA PREPROCEDURE EVALUATION
Anesthesia Pre-Procedure Evaluation    Patient: Karina Rice   MRN: [id number] : 1985        Procedure : Procedure(s):  PANNICULECTOMY          No past medical history on file.   Past Surgical History:   Procedure Laterality Date    LAPAROSCOPIC GASTRIC SLEEVE N/A 2021    Procedure: GASTRECTOMY, SLEEVE, LAPAROSCOPIC;  Surgeon: Augusot Keenan MD;  Location: [facility] OR      Allergies   Allergen Reactions    Compazine [Prochlorperazine] Difficulty breathing    Toradol [Ketorolac] Difficulty breathing     Patient had IV Toradol in ED and states she passed out.  Had difficulty breathing when she woke up.    Oxymetazoline Itching      Social History     Tobacco Use    Smoking status: Never    Smokeless tobacco: Never   Substance Use Topics    Alcohol use: Never      Wt Readings from Last 1 Encounters:   24 67.1 kg (148 lb)        Anesthesia Evaluation   Pt has had prior anesthetic.     No history of anesthetic complications       ROS/MED HX  ENT/Pulmonary:     (+)                      asthma                  Neurologic:       Cardiovascular:  - neg cardiovascular ROS     METS/Exercise Tolerance:     Hematologic:     (+)      anemia,          Musculoskeletal:       GI/Hepatic: Comment: S/p LAPAROSCOPIC GASTRIC SLEEVE          Renal/Genitourinary:  - neg Renal ROS     Endo:  - neg endo ROS     Psychiatric/Substance Use:     (+) psychiatric history anxiety       Infectious Disease:  - neg infectious disease ROS     Malignancy:  - neg malignancy ROS     Other:            Physical Exam    Airway        Mallampati: II   TM distance: > 3 FB   Neck ROM: full   Mouth opening: > 3 cm    Respiratory Devices and Support         Dental       (+) Completely normal teeth      Cardiovascular   cardiovascular exam normal          Pulmonary   pulmonary exam normal                OUTSIDE LABS:  CBC:   Lab Results   Component Value Date    WBC 5.3 2024    WBC 6.6 2024    HGB 11.0 (L) 2024    HGB  "11.6 (L) 03/01/2024    HCT 34.6 (L) 08/14/2024    HCT 35.9 03/01/2024     08/14/2024     03/01/2024     BMP:   Lab Results   Component Value Date     03/01/2024     04/17/2023    POTASSIUM 3.5 03/01/2024    POTASSIUM 3.9 04/17/2023    CHLORIDE 100 03/01/2024    CHLORIDE 103 04/17/2023    CO2 26 03/01/2024    CO2 26 04/17/2023    BUN 8.5 03/01/2024    BUN 9.1 04/17/2023    CR 0.59 03/01/2024    CR 0.56 04/17/2023    GLC 82 03/01/2024     (H) 04/17/2023     COAGS: No results found for: \"PTT\", \"INR\", \"FIBR\"  POC:   Lab Results   Component Value Date    BGM 78 05/12/2021     HEPATIC:   Lab Results   Component Value Date    ALBUMIN 4.8 03/01/2024    PROTTOTAL 7.9 03/01/2024    ALT 16 03/01/2024    AST 27 03/01/2024    ALKPHOS 61 03/01/2024    BILITOTAL 0.4 03/01/2024     OTHER:   Lab Results   Component Value Date    A1C 5.5 03/01/2024    MICHAEL 9.9 03/01/2024       Anesthesia Plan    ASA Status:  2    NPO Status:  NPO Appropriate    Anesthesia Type: General.     - Airway: LMA   Induction: Intravenous, Propofol.   Maintenance: TIVA.        Consents    Anesthesia Plan(s) and associated risks, benefits, and realistic alternatives discussed. Questions answered and patient/representative(s) expressed understanding.     - Discussed:     - Discussed with:  Patient            Postoperative Care    Pain management: IV analgesics, Oral pain medications, Multi-modal analgesia, Peripheral nerve block (Single Shot).   PONV prophylaxis: Ondansetron (or other 5HT-3), Dexamethasone or Solumedrol, Background Propofol Infusion     Comments:               Alex Sanchez MD    I have reviewed the pertinent notes and labs in the chart from the past 30 days and (re)examined the patient.  Any updates or changes from those notes are reflected in this note.              # Overweight: Estimated body mass index is 26.22 kg/m  as calculated from the following:    Height as of 8/14/24: 1.6 m (5' 3\").    " Weight as of 8/14/24: 67.1 kg (148 lb).

## 2024-08-27 ENCOUNTER — HOSPITAL ENCOUNTER (OUTPATIENT)
Facility: AMBULATORY SURGERY CENTER | Age: 39
Discharge: HOME | End: 2024-08-27
Attending: PLASTIC SURGERY
Payer: COMMERCIAL

## 2024-08-27 ENCOUNTER — ANESTHESIA (OUTPATIENT)
Dept: SURGERY | Facility: AMBULATORY SURGERY CENTER | Age: 39
End: 2024-08-27
Payer: COMMERCIAL

## 2024-08-27 VITALS
HEIGHT: 63 IN | HEART RATE: 60 BPM | BODY MASS INDEX: 25.87 KG/M2 | DIASTOLIC BLOOD PRESSURE: 80 MMHG | TEMPERATURE: 97 F | OXYGEN SATURATION: 98 % | WEIGHT: 146 LBS | SYSTOLIC BLOOD PRESSURE: 117 MMHG | RESPIRATION RATE: 16 BRPM

## 2024-08-27 DIAGNOSIS — L98.7 EXCESS SKIN OF ABDOMEN: Primary | ICD-10-CM

## 2024-08-27 LAB
HCG UR QL: NEGATIVE
INTERNAL QC OK POCT: NORMAL
POCT KIT EXPIRATION DATE: NORMAL
POCT KIT LOT NUMBER: NORMAL

## 2024-08-27 PROCEDURE — 88300 SURGICAL PATH GROSS: CPT | Mod: TC | Performed by: PLASTIC SURGERY

## 2024-08-27 PROCEDURE — 15830 EXC EXCESSIVE SKIN ABDOMEN: CPT | Mod: GC | Performed by: PLASTIC SURGERY

## 2024-08-27 PROCEDURE — 88300 SURGICAL PATH GROSS: CPT | Mod: 26 | Performed by: PATHOLOGY

## 2024-08-27 PROCEDURE — 81025 URINE PREGNANCY TEST: CPT | Performed by: PATHOLOGY

## 2024-08-27 PROCEDURE — 15830 EXC EXCESSIVE SKIN ABDOMEN: CPT

## 2024-08-27 RX ORDER — DEXAMETHASONE SODIUM PHOSPHATE 10 MG/ML
4 INJECTION, SOLUTION INTRAMUSCULAR; INTRAVENOUS
Status: DISCONTINUED | OUTPATIENT
Start: 2024-08-27 | End: 2024-08-28 | Stop reason: HOSPADM

## 2024-08-27 RX ORDER — NALOXONE HYDROCHLORIDE 0.4 MG/ML
0.1 INJECTION, SOLUTION INTRAMUSCULAR; INTRAVENOUS; SUBCUTANEOUS
Status: DISCONTINUED | OUTPATIENT
Start: 2024-08-27 | End: 2024-08-28 | Stop reason: HOSPADM

## 2024-08-27 RX ORDER — LABETALOL HYDROCHLORIDE 5 MG/ML
10 INJECTION, SOLUTION INTRAVENOUS
Status: DISCONTINUED | OUTPATIENT
Start: 2024-08-27 | End: 2024-08-27 | Stop reason: HOSPADM

## 2024-08-27 RX ORDER — FENTANYL CITRATE 50 UG/ML
INJECTION, SOLUTION INTRAMUSCULAR; INTRAVENOUS PRN
Status: DISCONTINUED | OUTPATIENT
Start: 2024-08-27 | End: 2024-08-27

## 2024-08-27 RX ORDER — HYDROMORPHONE HYDROCHLORIDE 1 MG/ML
0.2 INJECTION, SOLUTION INTRAMUSCULAR; INTRAVENOUS; SUBCUTANEOUS EVERY 5 MIN PRN
Status: DISCONTINUED | OUTPATIENT
Start: 2024-08-27 | End: 2024-08-27 | Stop reason: HOSPADM

## 2024-08-27 RX ORDER — CEFAZOLIN SODIUM 2 G/50ML
2 SOLUTION INTRAVENOUS SEE ADMIN INSTRUCTIONS
Status: DISCONTINUED | OUTPATIENT
Start: 2024-08-27 | End: 2024-08-27 | Stop reason: HOSPADM

## 2024-08-27 RX ORDER — FENTANYL CITRATE 50 UG/ML
25 INJECTION, SOLUTION INTRAMUSCULAR; INTRAVENOUS EVERY 5 MIN PRN
Status: DISCONTINUED | OUTPATIENT
Start: 2024-08-27 | End: 2024-08-27 | Stop reason: HOSPADM

## 2024-08-27 RX ORDER — ONDANSETRON 4 MG/1
4 TABLET, ORALLY DISINTEGRATING ORAL EVERY 30 MIN PRN
Status: DISCONTINUED | OUTPATIENT
Start: 2024-08-27 | End: 2024-08-28 | Stop reason: HOSPADM

## 2024-08-27 RX ORDER — PROPOFOL 10 MG/ML
INJECTION, EMULSION INTRAVENOUS PRN
Status: DISCONTINUED | OUTPATIENT
Start: 2024-08-27 | End: 2024-08-27

## 2024-08-27 RX ORDER — DEXAMETHASONE SODIUM PHOSPHATE 10 MG/ML
4 INJECTION, SOLUTION INTRAMUSCULAR; INTRAVENOUS
Status: DISCONTINUED | OUTPATIENT
Start: 2024-08-27 | End: 2024-08-27 | Stop reason: HOSPADM

## 2024-08-27 RX ORDER — OXYCODONE HYDROCHLORIDE 5 MG/1
10 TABLET ORAL
Status: DISCONTINUED | OUTPATIENT
Start: 2024-08-27 | End: 2024-08-28 | Stop reason: HOSPADM

## 2024-08-27 RX ORDER — ONDANSETRON 4 MG/1
4 TABLET, ORALLY DISINTEGRATING ORAL EVERY 30 MIN PRN
Status: DISCONTINUED | OUTPATIENT
Start: 2024-08-27 | End: 2024-08-27 | Stop reason: HOSPADM

## 2024-08-27 RX ORDER — FENTANYL CITRATE 50 UG/ML
25-50 INJECTION, SOLUTION INTRAMUSCULAR; INTRAVENOUS
Status: DISCONTINUED | OUTPATIENT
Start: 2024-08-27 | End: 2024-08-27 | Stop reason: HOSPADM

## 2024-08-27 RX ORDER — GLYCOPYRROLATE 0.2 MG/ML
INJECTION, SOLUTION INTRAMUSCULAR; INTRAVENOUS PRN
Status: DISCONTINUED | OUTPATIENT
Start: 2024-08-27 | End: 2024-08-27

## 2024-08-27 RX ORDER — NALOXONE HYDROCHLORIDE 0.4 MG/ML
0.4 INJECTION, SOLUTION INTRAMUSCULAR; INTRAVENOUS; SUBCUTANEOUS
Status: DISCONTINUED | OUTPATIENT
Start: 2024-08-27 | End: 2024-08-27 | Stop reason: HOSPADM

## 2024-08-27 RX ORDER — NALOXONE HYDROCHLORIDE 0.4 MG/ML
0.2 INJECTION, SOLUTION INTRAMUSCULAR; INTRAVENOUS; SUBCUTANEOUS
Status: DISCONTINUED | OUTPATIENT
Start: 2024-08-27 | End: 2024-08-27 | Stop reason: HOSPADM

## 2024-08-27 RX ORDER — FLUMAZENIL 0.1 MG/ML
0.2 INJECTION, SOLUTION INTRAVENOUS
Status: DISCONTINUED | OUTPATIENT
Start: 2024-08-27 | End: 2024-08-27 | Stop reason: HOSPADM

## 2024-08-27 RX ORDER — OXYCODONE HYDROCHLORIDE 5 MG/1
5 TABLET ORAL
Status: COMPLETED | OUTPATIENT
Start: 2024-08-27 | End: 2024-08-27

## 2024-08-27 RX ORDER — ACETAMINOPHEN 325 MG/1
975 TABLET ORAL ONCE
Status: COMPLETED | OUTPATIENT
Start: 2024-08-27 | End: 2024-08-27

## 2024-08-27 RX ORDER — CEFAZOLIN SODIUM 2 G/50ML
2 SOLUTION INTRAVENOUS
Status: COMPLETED | OUTPATIENT
Start: 2024-08-27 | End: 2024-08-27

## 2024-08-27 RX ORDER — PROPOFOL 10 MG/ML
INJECTION, EMULSION INTRAVENOUS CONTINUOUS PRN
Status: DISCONTINUED | OUTPATIENT
Start: 2024-08-27 | End: 2024-08-27

## 2024-08-27 RX ORDER — ONDANSETRON 2 MG/ML
4 INJECTION INTRAMUSCULAR; INTRAVENOUS EVERY 30 MIN PRN
Status: DISCONTINUED | OUTPATIENT
Start: 2024-08-27 | End: 2024-08-27 | Stop reason: HOSPADM

## 2024-08-27 RX ORDER — SODIUM CHLORIDE, SODIUM LACTATE, POTASSIUM CHLORIDE, CALCIUM CHLORIDE 600; 310; 30; 20 MG/100ML; MG/100ML; MG/100ML; MG/100ML
INJECTION, SOLUTION INTRAVENOUS CONTINUOUS
Status: DISCONTINUED | OUTPATIENT
Start: 2024-08-27 | End: 2024-08-27 | Stop reason: HOSPADM

## 2024-08-27 RX ORDER — NALOXONE HYDROCHLORIDE 0.4 MG/ML
0.1 INJECTION, SOLUTION INTRAMUSCULAR; INTRAVENOUS; SUBCUTANEOUS
Status: DISCONTINUED | OUTPATIENT
Start: 2024-08-27 | End: 2024-08-27 | Stop reason: HOSPADM

## 2024-08-27 RX ORDER — ONDANSETRON 2 MG/ML
4 INJECTION INTRAMUSCULAR; INTRAVENOUS EVERY 30 MIN PRN
Status: DISCONTINUED | OUTPATIENT
Start: 2024-08-27 | End: 2024-08-28 | Stop reason: HOSPADM

## 2024-08-27 RX ORDER — ONDANSETRON 4 MG/1
4 TABLET, ORALLY DISINTEGRATING ORAL EVERY 8 HOURS PRN
Qty: 4 TABLET | Refills: 0 | Status: SHIPPED | OUTPATIENT
Start: 2024-08-27

## 2024-08-27 RX ORDER — AMOXICILLIN 250 MG
1-2 CAPSULE ORAL 2 TIMES DAILY
Qty: 30 TABLET | Refills: 0 | Status: SHIPPED | OUTPATIENT
Start: 2024-08-27

## 2024-08-27 RX ORDER — LIDOCAINE HYDROCHLORIDE 20 MG/ML
INJECTION, SOLUTION INFILTRATION; PERINEURAL PRN
Status: DISCONTINUED | OUTPATIENT
Start: 2024-08-27 | End: 2024-08-27

## 2024-08-27 RX ORDER — KETAMINE HYDROCHLORIDE 10 MG/ML
INJECTION INTRAMUSCULAR; INTRAVENOUS PRN
Status: DISCONTINUED | OUTPATIENT
Start: 2024-08-27 | End: 2024-08-27

## 2024-08-27 RX ORDER — OXYCODONE HYDROCHLORIDE 5 MG/1
5-10 TABLET ORAL EVERY 6 HOURS PRN
Qty: 20 TABLET | Refills: 0 | Status: SHIPPED | OUTPATIENT
Start: 2024-08-27

## 2024-08-27 RX ORDER — ONDANSETRON 2 MG/ML
INJECTION INTRAMUSCULAR; INTRAVENOUS PRN
Status: DISCONTINUED | OUTPATIENT
Start: 2024-08-27 | End: 2024-08-27

## 2024-08-27 RX ORDER — LIDOCAINE 40 MG/G
CREAM TOPICAL
Status: DISCONTINUED | OUTPATIENT
Start: 2024-08-27 | End: 2024-08-27 | Stop reason: HOSPADM

## 2024-08-27 RX ORDER — FENTANYL CITRATE 50 UG/ML
50 INJECTION, SOLUTION INTRAMUSCULAR; INTRAVENOUS EVERY 5 MIN PRN
Status: DISCONTINUED | OUTPATIENT
Start: 2024-08-27 | End: 2024-08-27 | Stop reason: HOSPADM

## 2024-08-27 RX ORDER — HYDROMORPHONE HYDROCHLORIDE 1 MG/ML
0.4 INJECTION, SOLUTION INTRAMUSCULAR; INTRAVENOUS; SUBCUTANEOUS EVERY 5 MIN PRN
Status: DISCONTINUED | OUTPATIENT
Start: 2024-08-27 | End: 2024-08-27 | Stop reason: HOSPADM

## 2024-08-27 RX ORDER — MAGNESIUM HYDROXIDE 1200 MG/15ML
LIQUID ORAL PRN
Status: DISCONTINUED | OUTPATIENT
Start: 2024-08-27 | End: 2024-08-27 | Stop reason: HOSPADM

## 2024-08-27 RX ORDER — BUPIVACAINE HYDROCHLORIDE AND EPINEPHRINE 2.5; 5 MG/ML; UG/ML
INJECTION, SOLUTION INFILTRATION; PERINEURAL
Status: COMPLETED | OUTPATIENT
Start: 2024-08-27 | End: 2024-08-27

## 2024-08-27 RX ORDER — DEXAMETHASONE SODIUM PHOSPHATE 4 MG/ML
INJECTION, SOLUTION INTRA-ARTICULAR; INTRALESIONAL; INTRAMUSCULAR; INTRAVENOUS; SOFT TISSUE PRN
Status: DISCONTINUED | OUTPATIENT
Start: 2024-08-27 | End: 2024-08-27

## 2024-08-27 RX ADMIN — DEXAMETHASONE SODIUM PHOSPHATE 4 MG: 4 INJECTION, SOLUTION INTRA-ARTICULAR; INTRALESIONAL; INTRAMUSCULAR; INTRAVENOUS; SOFT TISSUE at 13:15

## 2024-08-27 RX ADMIN — KETAMINE HYDROCHLORIDE 30 MG: 10 INJECTION INTRAMUSCULAR; INTRAVENOUS at 13:37

## 2024-08-27 RX ADMIN — PROPOFOL 150 MCG/KG/MIN: 10 INJECTION, EMULSION INTRAVENOUS at 14:07

## 2024-08-27 RX ADMIN — FENTANYL CITRATE 12.5 MCG: 50 INJECTION, SOLUTION INTRAMUSCULAR; INTRAVENOUS at 15:09

## 2024-08-27 RX ADMIN — OXYCODONE HYDROCHLORIDE 5 MG: 5 TABLET ORAL at 15:39

## 2024-08-27 RX ADMIN — ACETAMINOPHEN 975 MG: 325 TABLET ORAL at 12:10

## 2024-08-27 RX ADMIN — FENTANYL CITRATE 50 MCG: 50 INJECTION, SOLUTION INTRAMUSCULAR; INTRAVENOUS at 13:14

## 2024-08-27 RX ADMIN — PROPOFOL 100 MG: 10 INJECTION, EMULSION INTRAVENOUS at 13:37

## 2024-08-27 RX ADMIN — BUPIVACAINE HYDROCHLORIDE AND EPINEPHRINE 20 ML: 2.5; 5 INJECTION, SOLUTION INFILTRATION; PERINEURAL at 13:03

## 2024-08-27 RX ADMIN — FENTANYL CITRATE 50 MCG: 50 INJECTION, SOLUTION INTRAMUSCULAR; INTRAVENOUS at 12:51

## 2024-08-27 RX ADMIN — PROPOFOL 150 MG: 10 INJECTION, EMULSION INTRAVENOUS at 13:14

## 2024-08-27 RX ADMIN — FENTANYL CITRATE 12.5 MCG: 50 INJECTION, SOLUTION INTRAMUSCULAR; INTRAVENOUS at 15:18

## 2024-08-27 RX ADMIN — GLYCOPYRROLATE 0.2 MG: 0.2 INJECTION, SOLUTION INTRAMUSCULAR; INTRAVENOUS at 13:09

## 2024-08-27 RX ADMIN — FENTANYL CITRATE 50 MCG: 50 INJECTION, SOLUTION INTRAMUSCULAR; INTRAVENOUS at 13:35

## 2024-08-27 RX ADMIN — PROPOFOL 150 MCG/KG/MIN: 10 INJECTION, EMULSION INTRAVENOUS at 13:15

## 2024-08-27 RX ADMIN — SODIUM CHLORIDE, SODIUM LACTATE, POTASSIUM CHLORIDE, CALCIUM CHLORIDE: 600; 310; 30; 20 INJECTION, SOLUTION INTRAVENOUS at 12:30

## 2024-08-27 RX ADMIN — LIDOCAINE HYDROCHLORIDE 100 MG: 20 INJECTION, SOLUTION INFILTRATION; PERINEURAL at 13:14

## 2024-08-27 RX ADMIN — CEFAZOLIN SODIUM 2 G: 2 SOLUTION INTRAVENOUS at 13:18

## 2024-08-27 RX ADMIN — ONDANSETRON 4 MG: 2 INJECTION INTRAMUSCULAR; INTRAVENOUS at 14:09

## 2024-08-27 NOTE — ANESTHESIA PROCEDURE NOTES
TAP Procedure Note    Pre-Procedure   Staff -        Anesthesiologist:  Yohan Rasmussen MD       Resident/Fellow: Alex Krueger MD       Performed By: resident       Location: pre-op       Pre-Anesthestic Checklist: patient identified, IV checked, site marked, risks and benefits discussed, informed consent, monitors and equipment checked, pre-op evaluation, at physician/surgeon's request and post-op pain management  Timeout:       Correct Patient: Yes        Correct Procedure: Yes        Correct Site: Yes        Correct Position: Yes        Correct Laterality: Yes        Site Marked: Yes  Procedure Documentation  Procedure: TAP       Diagnosis: POST OPERATIVE PAIN       Laterality: bilateral       Patient Position: supine       Patient Prep/Sterile Barriers: sterile gloves, mask       Skin prep: Chloraprep       Needle Type: short bevel       Needle Gauge: 21.        Needle Length (millimeters): 110        Ultrasound guided       1. Ultrasound was used to identify targeted nerve, plexus, vascular marker, or fascial plane and place a needle adjacent to it in real-time.       2. Ultrasound was used to visualize the spread of anesthetic in close proximity to the above referenced structure.       3. A permanent image is entered into the patient's record.    Assessment/Narrative         The placement was negative for: blood aspirated, painful injection and site bleeding       Paresthesias: No.       Bolus given via needle..        Secured via.        Insertion/Infusion Method: Single Shot       Complications: none       Injection made incrementally with aspirations every 5 mL.    Medication(s) Administered   Bupivacaine 0.25% w/ 1:200K Epi (Injection) - Injection   20 mL - 8/27/2024 1:03:00 PM  Bupivacaine liposome (Exparel) 1.3% LA inj susp (Infiltration) - Infiltration   10 mL - 8/27/2024 1:03:00 PM   Comments:  133mg of Exparel used      FOR [facility] ([facility]) ONLY:   Pain Team Contact  "information: please page the Pain Team Via [facility]. Search \"Pain\". During daytime hours, please page the attending first. At night please page the resident first.      "

## 2024-08-27 NOTE — OR NURSING
B TAP block with exparel by Dr. Rasmussen and Dr. Meredith.  Given versed 1 mg and fentanyl 50 mcg iv.  VSS.  Tolerated well.

## 2024-08-27 NOTE — BRIEF OP NOTE
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  Complications: None.  Implants: * No implants in log *      Lee Ann Hart   Plastic & Reconstructive Surgery

## 2024-08-27 NOTE — ANESTHESIA POSTPROCEDURE EVALUATION
Patient: Karina Rice    Procedure: Procedure(s):  PANNICULECTOMY       Anesthesia Type:  General    Note:  Disposition: Outpatient   Postop Pain Control: Uneventful            Sign Out: Well controlled pain   PONV: No   Neuro/Psych: Uneventful            Sign Out: Acceptable/Baseline neuro status   Airway/Respiratory: Uneventful            Sign Out: Acceptable/Baseline resp. status   CV/Hemodynamics: Uneventful            Sign Out: Acceptable CV status; No obvious hypovolemia; No obvious fluid overload   Other NRE: NONE   DID A NON-ROUTINE EVENT OCCUR? No           Last vitals:  Vitals Value Taken Time   /75 08/27/24 1440   Temp     Pulse 58 08/27/24 1442   Resp 20 08/27/24 1442   SpO2 100 % 08/27/24 1442   Vitals shown include unfiled device data.    Electronically Signed By: Yohan Rasmussen MD  August 27, 2024  2:43 PM

## 2024-08-27 NOTE — OR NURSING
[facility]  Pre/Post Care Unit 3A        Karina Rice  [address]    [place] [address]      Date: 8/27/2024      TO WHOM IT MAY CONCERN:    Karina Rice was seen at our hospital for a procedure on 8/27/2024.  Patient may return to school or work 9/16/24.      Sincerely,      Giovanna Alfonso RN RN  8/27/2024  3:52 PM       Pre/Post Care Unit

## 2024-08-27 NOTE — OP NOTE
PREOPERATIVE DIAGNOSIS: Massive weight loss with symptomatic abdominal panniculus.     POSTOPERATIVE DIAGNOSIS: Massive weight loss with symptomatic abdominal panniculus.     SURGEON: Danica Mack MD     RESIDENTS:    MD Tucker Houston MD    PROCEDURE:   1. Panniculectomy.     ANESTHESIA: General anesthesia with LMA.    COMPLICATIONS: Nil.     DRAINS: None    BLOOS LOSS: 100 mL.     SPECIMENS: Skin and fat from abdomen, measuring 1012 gms.     DESCRIPTION OF PROCEDURE: After informed consent was taken from the patient,  the proper site and procedure were ascertained with her, and she was appropriately marked, and she was taken to the operating room. She was placed in a supine position with her knees comfortably flexed, pillows underneath them, and pneumoboots placed and running prior to induction of anesthesia. Preoperative antibiotics were given in the OR. All pressure points were appropriately padded. General anesthesia was administered without any complications. She was placed in a supine position, but arms abducted to about 50 degrees, such that she could be flexed to about 40-50 degrees. We went ahead and prepped and draped in a standard surgical fashion. Went ahead and marked out her inferior incision which was about 7 cm from the external genitalia in the midline, and then within that mons area, we marked out a horizontal line while tractioning up on the mons for about 8 cm on either side, and then pulling up on the lateral tissues, we marked out the anterior superior iliac spine, and then marked out a line that joined the lateral extent of the mons marking to the lateral extent of our dissection that I had marked out preoperatively, passing through the plane of the anterior superior iliac spine on each side. Once I was happy with the markings, we began. We made an incision, dissected down through the subcutaneous tissues, elevated the skin flap in a cephalad direction superficial to the  Italia's layer until I was outside the groin, and then I dove deep to the Italia's layer and elevated the skin flaps cephalad anterior to the anterior abdominal wall, keeping a thin layer of areolar tissue on the anterior abdominal wall. All of this was done to decrease seroma formation. Dissection of this flap was then carried out towards the umbilicus. Once the umbilical plane was approached, the dissection was stopped. I then  went ahead and marked out the excess skin and excised it, again ensuring hemostasis.  I then began the closure portion. I sued 2-0 Stratafix for the italia layer and also took deeper bites of the fascia to close off the deadspace. I  then used 2-0 Monocryl suture in a deep dermal layer, followed by 3-0 Stratafix suture in a running intracuticular manner. Surgical tape and glue was used to close all of the incisions. An abdominal binder placed over the abdomen. The patient tolerated the procedure well. All counts were correct at the end of the case. The patient was then extubated and sent to the recovery room.

## 2024-08-27 NOTE — ANESTHESIA CARE TRANSFER NOTE
Patient: Karina Rice    Procedure: Procedure(s):  PANNICULECTOMY       Diagnosis: History of obesity [Z86.39]  Excess skin of abdomen [L98.7]  Abdominal pannus [E65]  Diagnosis Additional Information: No value filed.    Anesthesia Type:   General     Note:    Oropharynx: oropharynx clear of all foreign objects and spontaneously breathing  Level of Consciousness: awake  Oxygen Supplementation: face mask  Level of Supplemental Oxygen (L/min / FiO2): 6  Independent Airway: airway patency satisfactory and stable  Dentition: dentition unchanged  Vital Signs Stable: post-procedure vital signs reviewed and stable  Report to RN Given: handoff report given  Patient transferred to: PACU  Comments: VSS and WNL, comfortable, no PONV, report to Giovanna MCDUFFIE  Handoff Report: Identifed the Patient, Identified the Reponsible Provider, Reviewed the pertinent medical history, Discussed the surgical course, Reviewed Intra-OP anesthesia mangement and issues during anesthesia, Set expectations for post-procedure period and Allowed opportunity for questions and acknowledgement of understanding      Vitals:  Vitals Value Taken Time   /75 08/27/24 1440   Temp 36.4  C (97.6  F) 08/27/24 1439   Pulse 64 08/27/24 1445   Resp 12 08/27/24 1445   SpO2 100 % 08/27/24 1445   Vitals shown include unfiled device data.    Electronically Signed By: SOLE Xie CRNA  August 27, 2024  2:45 PM

## 2024-08-27 NOTE — DISCHARGE INSTRUCTIONS
ABDOMINOPLASTY and/or PANNICULECTOMY POST-OPERATIVE INSTRUCTIONS    General:   Have someone at home with you for the first 24 hours. You may need additional support for the first week.   Get plenty of rest and eat a balanced diet.   Drink plenty of fluids to help with healing and to help prevent constipation.    Avoid alcohol for a minimum of 3 weeks after surgery as it can cause fluid retention.   No smoking or any nicotine products.    Abdominoplasty is most painful the first 1-2 weeks. Pain will improve, soreness/tightness will persist for 6-8 weeks.   You will have bruising and swelling of the abdomen. The majority of bruising and swelling will subside in 6-8 weeks.    Activity:   Start walking around your house as soon as possible, this will help reduce swelling and decrease risk of blood clots   You will likely be bent over for the first week. It is normal for the incision area to feel tight. After 1 week you can start to stand upright as it feels more comfortable.   Turn to the side and push up with your arms when getting out of bed to avoid putting stress on the incision.  Sleep with pillows under knees and head elevated on 2 pillows for the first week to take tension off the incision.   Avoid strenuous activities or straining abdominal muscles, no working out or lifting greater than 10lbs for 6 weeks  Resume sexual activity as comfort permits, usually 2-3 weeks postoperatively.  In general, you can drive around 2 weeks post op. You must be off all narcotic pain medications and have full range of motion in your legs and no discomfort in your abdomen when lifting your legs.  No heavy lifting, nothing greater than 10lbs until 6 weeks post op.     Medications:    You can take Ibuprofen 400-800 mg and Tylenol 650 mg for pain relief. Please take each every 6 hours, and for optimal pain relief - please stagger the medications so that you are taking one or the other every 3 hours. It is best to make a  chart/schedule of this to stay organized. If you are taking additional pain medications, please do not exceed 4000 mg of Tylenol daily from all sources.   You have been prescribed additional pain meds such as narcotics and muscle relaxants, please take as instructed and as needed. Ok to take narcotics in addition to tylenol/ibuprofen.   If you are taking narcotic medications, please do not operate heavy machinery or drive.    Please take any antibiotics as prescribed     Incision Care:   Incisions have a tape and glue in place. Please leave in place for 2-3 weeks. If it starts to peel off on its own you can trim back the tape.   If you have skin staples these will be removed in 2-3 weeks in clinic  Ok to get incisions wet in the shower 48 hours post op. If you have drains it is ok to get drains wet in the shower. No soaking in tubs or baths for 6 weeks or until all incisions/wounds have healed.   Wear abdominal compression with a binder as directed, usually this is for 6 weeks. You can wear pads over the incision as needed for comfort.   Small amounts of drainage from incisions can be expected.     Drain Management:   If you have drains please strip, empty and record output twice a day. Bring your drain log to your post op appointment. This is needed so we know when the drain is ready to be removed.     Follow up:   Follow up with your Surgeon or Physician Assistant will be 7-14 days after discharge     When to call:   Please call the office and/or consider return to the ER if you experience worsening pain not relieved by medications, increased swelling, redness to skin or high fevers >101F or if there are unexpected problems like shortness of breath.   Contact us on [facility] Monday - Friday, 8 a.m. - 4:30 p.m. or call [phone] to speak with our nursing team   After hours and on weekends, call Hospital Paging at [phone], and ask for the Plastic Surgery Resident on call     [facility] Amb[facility] Surgery and  "Procedure Center  Home Care Following Anesthesia  For 24 hours after surgery:  Get plenty of rest.  A responsible adult must stay with you for at least 24 hours after you leave the surgery center.  Do not drive or use heavy equipment.  If you have weakness or tingling, don't drive or use heavy equipment until this feeling goes away.   Do not drink alcohol.   Avoid strenuous or risky activities.  Ask for help when climbing stairs.  You may feel lightheaded.  IF so, sit for a few minutes before standing.  Have someone help you get up.   If you have nausea (feel sick to your stomach): Drink only clear liquids such as apple juice, ginger ale, broth or 7-Up.  Rest may also help.  Be sure to drink enough fluids.  Move to a regular diet as you feel able.   You may have a slight fever.  Call the doctor if your fever is over 100 F (37.7 C) (taken under the tongue) or lasts longer than 24 hours.  You may have a dry mouth, a sore throat, muscle aches or trouble sleeping. These should go away after 24 hours.  Do not make important or legal decisions.   It is recommended to avoid smoking.        Today you received an Exparel block to numb the nerves near your surgery site.  This is a block using local anesthetic or \"numbing\" medication injected around the nerves to anesthetize or \"numb\" the area supplied by those nerves.  This block is injected into the muscle layer near your surgical site.  This medication may numb the location where you had surgery up to 72 hours.  If your surgical site is an arm or leg you should be careful with your affected limb, since it is possible to injure your limb without being aware of it due to the numbing.  Until full feeling returns, you should guard against bumping or hitting your limb, and avoid extreme hot or cold temperatures on the skin.  As the block wears off, the feeling will return as a tingling or prickly sensation near your surgical site.  You will experince more discomfort from your " incision as the feeling returns.  You may want to take a pain pill (a narcotic or Tylenol if this was prescribed by your surgeon) when you start to experience mild pain before the pain beomes more severe.  If your pain medications do not control your pain, you should notify your surgeon.    Tips for taking pain medications  To get the best pain relief possible, remember these points:  Take pain medications as directed, before pain becomes severe.  Pain medication can upset your stomach: taking it with food may help.  Constipation is a common side effect of pain medication. Drink plenty of  fluids.  Eat foods high in fiber. Take a stool softener if recommended by your doctor or pharmacist.  Do not drink alcohol, drive or operate machinery while taking pain medications.  Ask about other ways to control pain, such as with heat, ice or relaxation.    Tylenol/Acetaminophen Consumption    If you feel your pain relief is insufficient, you may take Tylenol/Acetaminophen in addition to your narcotic pain medication.   Be careful not to exceed 4,000 mg of Tylenol/Acetaminophen in a 24 hour period from all sources.  If you are taking extra strength Tylenol/acetaminophen (500 mg), the maximum dose is 8 tablets in 24 hours.  If you are taking regular strength acetaminophen (325 mg), the maximum dose is 12 tablets in 24 hours.    Call a doctor for any of the following:  Signs of infection (fever, growing tenderness at the surgery site, a large amount of drainage or bleeding, severe pain, foul-smelling drainage, redness, swelling).  It has been over 8 to 10 hours since surgery and you are still not able to urinate (pass water).  Headache for over 24 hours.  Numbness, tingling or weakness the day after surgery (if you had spinal anesthesia).  Signs of Covid-19 infection (temperature over 100 degrees, shortness of breath, cough, loss of taste/smell, generalized body aches, persistent headache, chills, sore throat,  nausea/vomiting/diarrhea)  Your doctor is:  Dr. Danica Mack, Plastic Surgery: [phone]                    Or dial [phone] and ask for the resident on call for:  Plastics  For emergency care, call the:  [facility]:  [phone] (TTY for hearing impaired: [phone])

## 2024-08-27 NOTE — ANESTHESIA PROCEDURE NOTES
Airway       Patient location during procedure: OR  Staff -        Performed By: CRNAIndications and Patient Condition       Indications for airway management: janet-procedural       Induction type:intravenous       Mask difficulty assessment: 1 - vent by mask    Final Airway Details       Final airway type: supraglottic airway    Supraglottic Airway Details        Type: LMA       Brand: LMA Unique       LMA size: 4    Post intubation assessment        Placement verified by: capnometry        Number of attempts at approach: 1       Number of other approaches attempted: 0       Secured with: tape       Ease of procedure: easy       Dentition: Intact and Unchanged

## 2024-08-30 ENCOUNTER — TELEPHONE (OUTPATIENT)
Dept: PLASTIC SURGERY | Facility: CLINIC | Age: 39
End: 2024-08-30
Payer: COMMERCIAL

## 2024-08-30 NOTE — TELEPHONE ENCOUNTER
[facility]    Phone Message    May a detailed message be left on voicemail: yes     Reason for Call: Form or Letter   Type or form/letter needing completion: Return to work letter and stating how many pounds she can lift.    Provider: Dr. MONALISA Mack    Date form needed: ASAP    Once completed: Send to patient's MyChart    Action Taken: Message routed to:  [facility]    Travel Screening: Not Applicable

## 2024-09-01 LAB
PATH REPORT.COMMENTS IMP SPEC: NORMAL
PATH REPORT.COMMENTS IMP SPEC: NORMAL
PATH REPORT.FINAL DX SPEC: NORMAL
PATH REPORT.GROSS SPEC: NORMAL
PATH REPORT.MICROSCOPIC SPEC OTHER STN: NORMAL
PATH REPORT.RELEVANT HX SPEC: NORMAL
PHOTO IMAGE: NORMAL

## 2024-09-03 ENCOUNTER — MYC MEDICAL ADVICE (OUTPATIENT)
Dept: PLASTIC SURGERY | Facility: CLINIC | Age: 39
End: 2024-09-03
Payer: COMMERCIAL

## 2024-09-03 DIAGNOSIS — E65 ABDOMINAL PANNUS: ICD-10-CM

## 2024-09-03 DIAGNOSIS — G89.18 POSTOPERATIVE PAIN: Primary | ICD-10-CM

## 2024-09-04 RX ORDER — OXYCODONE HYDROCHLORIDE 5 MG/1
5 TABLET ORAL EVERY 6 HOURS PRN
Qty: 12 TABLET | Refills: 0 | Status: SHIPPED | OUTPATIENT
Start: 2024-09-04 | End: 2024-09-04

## 2024-09-04 RX ORDER — GABAPENTIN 300 MG/1
CAPSULE ORAL
Qty: 48 CAPSULE | Refills: 0 | Status: SHIPPED | OUTPATIENT
Start: 2024-09-04 | End: 2024-09-04

## 2024-09-04 RX ORDER — GABAPENTIN 300 MG/1
CAPSULE ORAL
Qty: 70 CAPSULE | Refills: 0 | Status: SHIPPED | OUTPATIENT
Start: 2024-09-04 | End: 2024-09-18

## 2024-09-04 RX ORDER — OXYCODONE HYDROCHLORIDE 5 MG/1
5 TABLET ORAL EVERY 6 HOURS PRN
Qty: 12 TABLET | Refills: 0 | Status: SHIPPED | OUTPATIENT
Start: 2024-09-04 | End: 2024-09-07

## 2024-09-06 ENCOUNTER — OFFICE VISIT (OUTPATIENT)
Dept: PLASTIC SURGERY | Facility: CLINIC | Age: 39
End: 2024-09-06
Payer: COMMERCIAL

## 2024-09-06 VITALS
HEART RATE: 101 BPM | SYSTOLIC BLOOD PRESSURE: 109 MMHG | DIASTOLIC BLOOD PRESSURE: 78 MMHG | TEMPERATURE: 97.6 F | BODY MASS INDEX: 26.05 KG/M2 | OXYGEN SATURATION: 99 % | WEIGHT: 147 LBS | HEIGHT: 63 IN

## 2024-09-06 DIAGNOSIS — Z76.89 RETURN TO WORK EVALUATION: ICD-10-CM

## 2024-09-06 DIAGNOSIS — R21 RASH AND NONSPECIFIC SKIN ERUPTION: ICD-10-CM

## 2024-09-06 DIAGNOSIS — R20.2 PARESTHESIAS: ICD-10-CM

## 2024-09-06 DIAGNOSIS — E65 ABDOMINAL PANNUS: Primary | ICD-10-CM

## 2024-09-06 PROCEDURE — 99024 POSTOP FOLLOW-UP VISIT: CPT | Performed by: PHYSICIAN ASSISTANT

## 2024-09-06 ASSESSMENT — PAIN SCALES - GENERAL: PAINLEVEL: NO PAIN (0)

## 2024-09-06 NOTE — PROGRESS NOTES
Ms Rice presents to plastics clinic 9 days status post panniculectomy.  She feels that she is doing well and  requested a goiafa-cu-vkek note via [employer] which resulted in this visit.  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  She states that 3 days postoperatively, she was up and about, performing her normal daily activities which include somewhat heavy lifting.  She is not a stranger to surgery and feels she heals better if she is active.  She is delighted with the result so far.  2 days ago, she was prescribed Neurontin and oxycodone by our clinic for persistent pain, but no longer needs the narcotic pain medication.      The past few mornings, she has been waking with bilateral lower extremity and fingertip tingling that resolves shortly after waking.  She has never had this symptom before and wonders if it is related to the surgery and/or medications.    She asks today whether Dr. Mack performs breast lifts as her breasts are ptotic and deflated after her weight loss.  Additionally, she experiences intertriginous rashes for which she has tried over-the-counter topical products with no improvement.    Exam: Alert, pleasant, no acute distress  Dermabond Prineo tape covering surgical site without surrounding erythema or drainage.  Minimal postop edema.  No obvious standing cones laterally.  Breasts: Erythematous macular rash surrounding IMF of both breasts.  No skin maceration.  No neurodeficits.    A/P:  Today, I reviewed the rationale behind our lifting and activity restrictions.  She feels that she can continue to follow these restrictions and return to work, so work note provided with 15 pound lifting restriction until her 6-week postop date.  She has an appoint scheduled a week from today, but would like to minimize visits if possible.  She feels that she is doing well and would prefer to only attend her 6-week visit.  She looks great, is  young, active, and healthy.  Follow-up at 6 weeks seems reasonable.  At that time, she can discuss breast lift options with Dr. Mack.    We discussed removal of Dermabond Prineo tape between postop weeks 2 and 3 and starting scar care around week 3.  Reason for her leg and fingertip paresthesias unclear, but certainly could be secondary to stopping narcotics and/or starting gabapentin.  If this does not resolve within a week, she should follow-up with her primary provider for additional workup.  Should she have any questions or concerns before her 6-week visit, she should contact us via [employer].    20 minutes spent by me on the date of the encounter doing chart review, history and exam, documentation and further activities per the note

## 2024-09-06 NOTE — LETTER
9/6/2024       RE: Karina Rice  [address]  [address]  [facility] [address]     Dear Colleague,    Thank you for referring your patient, Karina Rice, to the [facility] PLASTIC AND RECONSTRU[facility] SURGERY CLINIC [place] at [facility]. Please see a copy of my visit note below.    Ms Rice presents to plastics clinic 9 days status post panniculectomy.  She feels that she is doing well and  requested a zhjdkh-qi-[id number] note via [employer] which resulted in this visit.  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  She states that 3 days postoperatively, she was up and about, performing her normal daily activities which include somewhat heavy lifting.  She is not a stranger to surgery and feels she heals better if she is active.  She is delighted with the result so far.  2 days ago, she was prescribed Neurontin and oxycodone by our clinic for persistent pain, but no longer needs the narcotic pain medication.      The past few mornings, she has been waking with bilateral lower extremity and fingertip tingling that resolves shortly after waking.  She has never had this symptom before and wonders if it is related to the surgery and/or medications.    She asks today whether Dr. Mack performs breast lifts as her breasts are ptotic and deflated after her weight loss.  Additionally, she experiences intertriginous rashes for which she has tried over-the-counter topical products with no improvement.    Exam: Alert, pleasant, no acute distress  Dermabond Prineo tape covering surgical site without surrounding erythema or drainage.  Minimal postop edema.  No obvious standing cones laterally.  Breasts: Erythematous macular rash surrounding IMF of both breasts.  No skin maceration.  No neurodeficits.    A/P:  Today, I reviewed the rationale behind our lifting and activity restrictions.  She feels that she can  continue to follow these restrictions and return to work, so work note provided with 15 pound lifting restriction until her 6-week postop date.  She has an appoint scheduled a week from today, but would like to minimize visits if possible.  She feels that she is doing well and would prefer to only attend her 6-week visit.  She looks great, is young, active, and healthy.  Follow-up at 6 weeks seems reasonable.  At that time, she can discuss breast lift options with Dr. Mack.    We discussed removal of Dermabond Prineo tape between postop weeks 2 and 3 and starting scar care around week 3.  Reason for her leg and fingertip paresthesias unclear, but certainly could be secondary to stopping narcotics and/or starting gabapentin.  If this does not resolve within a week, she should follow-up with her primary provider for additional workup.  Should she have any questions or concerns before her 6-week visit, she should contact us via [employer].    20 minutes spent by me on the date of the encounter doing chart review, history and exam, documentation and further activities per the note      Again, thank you for allowing me to participate in the care of your patient.      Sincerely,    Kavitha Prince PA-C

## 2024-09-06 NOTE — NURSING NOTE
"Chief Complaint   Patient presents with    Surgical Followup     Post-op, DOS 8/27/24.       Vitals:    09/06/24 0844   BP: 109/78   BP Location: Left arm   Patient Position: Sitting   Cuff Size: Adult Regular   Pulse: 101   Temp: 97.6  F (36.4  C)   TempSrc: Oral   SpO2: 99%   Weight: 147 lb   Height: 5' 3\"       Body mass index is 26.04 kg/m .      Cristofer Oropeza, EMT    "

## 2024-10-05 ENCOUNTER — HEALTH MAINTENANCE LETTER (OUTPATIENT)
Age: 39
End: 2024-10-05

## 2024-10-15 ENCOUNTER — TELEPHONE (OUTPATIENT)
Dept: PLASTIC SURGERY | Facility: CLINIC | Age: 39
End: 2024-10-15
Payer: COMMERCIAL

## 2024-10-15 NOTE — TELEPHONE ENCOUNTER
Sent [facility] (1st Attempt) for the patient to call back and schedule the following:    Appointment type: Post Op   Provider:    Return date: next available   Specialty phone number: [phone]  Additional appointment(s) needed: n/a  Additonal Notes: pt requesting postop with , open to scheduling in [place]

## 2024-10-23 ENCOUNTER — TELEPHONE (OUTPATIENT)
Dept: SURGERY | Facility: CLINIC | Age: 39
End: 2024-10-23
Payer: COMMERCIAL

## 2024-10-23 NOTE — TELEPHONE ENCOUNTER
Writer called and spoke with patient informing her that Dr. Mack responded and is recommending she follow up with her PCP due to her symptoms as they are likely unrelated to surgery.     Patient agreed to plan and will contact us if her PCP feels a visit with Dr. Mack is needed.     Tarsha Manzano LPN

## 2024-10-23 NOTE — TELEPHONE ENCOUNTER
"Patient walked in to clinic and spoke with the  saying she needs to see Dr. Mack asap and it is an emergency.     Patient is s/p panniculectomy DOS 8/27/24. She states about 2 weeks after surgery she noticed bloating in her abdomen and the lower body \"feels heavy\". Patient also states she has not been feeling well the last 3 days. Writer brought patient to exam room and took temp that read 97.9 degrees. Patient requested I take her weight and she states she is 3 lbs heavier than her normal weight. She is concerned this is all related to surgery.    Writer explained that Dr. Mack is not in clinic today but a message would be routed to MD. Patient states she is leaving out of the country on 11[place]22/24 and would like to have a visit prior to her leaving.    Tarsha Manzano LPN    "

## 2024-11-20 ENCOUNTER — VIRTUAL VISIT (OUTPATIENT)
Dept: ENDOCRINOLOGY | Facility: CLINIC | Age: 39
End: 2024-11-20
Payer: COMMERCIAL

## 2024-11-20 VITALS — HEIGHT: 63 IN | BODY MASS INDEX: 26.05 KG/M2 | WEIGHT: 147 LBS

## 2024-11-20 DIAGNOSIS — K21.9 GASTROESOPHAGEAL REFLUX DISEASE, UNSPECIFIED WHETHER ESOPHAGITIS PRESENT: ICD-10-CM

## 2024-11-20 DIAGNOSIS — Z86.39 HISTORY OF OBESITY: Primary | ICD-10-CM

## 2024-11-20 DIAGNOSIS — Z98.84 S/P LAPAROSCOPIC SLEEVE GASTRECTOMY: ICD-10-CM

## 2024-11-20 DIAGNOSIS — E66.3 OVERWEIGHT (BMI 25.0-29.9): ICD-10-CM

## 2024-11-20 RX ORDER — PHENTERMINE HYDROCHLORIDE 15 MG/1
15 CAPSULE ORAL EVERY MORNING
Qty: 30 CAPSULE | Refills: 3 | Status: SHIPPED | OUTPATIENT
Start: 2024-11-20 | End: 2024-11-20

## 2024-11-20 RX ORDER — PHENTERMINE HYDROCHLORIDE 15 MG/1
15 CAPSULE ORAL EVERY MORNING
Qty: 90 CAPSULE | Refills: 0 | Status: SHIPPED | OUTPATIENT
Start: 2024-11-20

## 2024-11-20 NOTE — NURSING NOTE
Current patient location:  [place] in pt's car    Is the patient currently in the state of [place]? YES    Visit mode:telephone    If the visit is dropped, the patient can be reconnected by: VIDEO VISIT: Text to cell phone:   Telephone Information:   Mobile [phone]       Will anyone else be joining the visit? NO  (If patient encounters technical issues they should call [phone]    Are changes needed to the allergy or medication list? Yes pt is not taking any medications    Are refills needed on medications prescribed by this physician? YES phentermine    Reason for visit: RECHECK    Paris Wilkins VVF    No current wt to report   100

## 2024-11-20 NOTE — PROGRESS NOTES
Return Bariatric Surgery Note    RE: Karina Rice  MR#: [id number]  : 1985  VISIT DATE: 2024      Dear Clinic, [facility],    I had the pleasure of seeing your patient, Karina Rice, in my post-bariatric surgery assessment clinic.    Assessment & Plan   Problem List Items Addressed This Visit          Other    S/P laparoscopic sleeve gastrectomy    Relevant Medications    phentermine 15 MG capsule    Overweight (BMI 25.0-29.9)    Relevant Medications    omeprazole (PRILOSEC) 20 MG DR capsule    phentermine 15 MG capsule    History of obesity - Primary     Has been off phentermine since having panniculectomy 2024. Describes difficulty sticking to her eating plan and exercising without taking phentermine. She feels she is able to eat 3 meals a day and adequate protein only if she is taking phentermine. Since being off phentermine she notes she is getting inadequate protein and usually only eating 1 meal a day. Agree to restart phentermine.     Increase protein (60g daily)   Increase water in take (64oz daily )   Restart multivitamin with iron   Continue omeprazole    Restart phentermine   Follow up 3 months          Relevant Medications    omeprazole (PRILOSEC) 20 MG DR capsule    phentermine 15 MG capsule     Other Visit Diagnoses       Gastroesophageal reflux disease, unspecified whether esophagitis present        Relevant Medications    omeprazole (PRILOSEC) 20 MG DR capsule               40 minutes spent by me on the date of the encounter doing chart review, history and exam, documentation and further activities per the note    CHIEF COMPLAINT: Post-bariatric surgery follow-up. High weight 239lb (BMI 41) . Preop with [facility]. S/p sleeve 2021 with Dr. Keenan. [place] 138lb. Re-established with clinic 2023 weight 151lb (BMI 26) with increasing hunger - started phentermine, discussed going back to bariatric postop recommendations. Last seen 3/2024 with Josselin Clifford  PA-C - cont phentermine, restart omeprazole     HISTORY OF PRESENT ILLNESS:      11/20/2024     2:01 PM   Questions Regarding Prior Weight Loss Surgery Reviewed With Patient   I had the following weight loss procedure Sleeve Gastrectomy   What year was your surgery? 2021   How has your weight changed since your last visit? I have gained weight   Do you currently have any of the following Heartburn, acid reflux, or GERD (acid reflux disease)?   Do you have any concerns today? gaining weight after surgery, pt feels like a lot of her weight is fluid weight     Sp panniculectomy 8/30/2024 with Dr. Mack     Anti-obesity medications:     Current:   Phentermine- has been off for a bit - since surgery     Recent diet changes:   Eating 1 times day  Not enough protein   Not enough water     Recent exercise/activity changes:   Work is physical     Recent stressors:   Has been out of the country   Working a lot     Vitamins/Labs: due 3/2025     GERD symptoms: ran out of omeprazole, heart burn after eating     Weight History:      11/20/2024     2:01 PM   --   What is your highest lifetime weight? 245   What is your lowest weight since surgery? (In pounds) 138     Initial Weight (lbs): 239 lbs ([facility] preop)  Weight: 66.7 kg (147 lb) (no current wt to report)  Last Visits Weight: 68.5 kg (151 lb)  Cumulative weight loss (lbs): 92  Weight Loss Percentage: 38.49%        11/20/2024     2:01 PM   Questions Regarding Co-Morbidities and Health Concerns Reviewed With Patient   Pre-diabetes Never   Diabetes II Never   High Blood Pressure Never   High cholesterol Never   Heartburn/Reflux Improved   Sleep apnea Never   PCOS Never   Back pain Never   Joint pain Never   Lower leg swelling Never           11/20/2024     2:01 PM   Eating Habits   How many meals do you eat per day? 2   Do you snack between meals? Yes   How much food are you eating at each meal? Less than 1/2 cup   Are you able to separate your meals and liquids by at  least 30 minutes? No   Are you able to avoid liquid calories? Yes           11/20/2024     2:01 PM   Exercise Questions Reviewed With Patient   How often do you exercise? Daily   What is the duration of your exercise (in minutes)? 60+ Minutes   What types of exercise do you do? walking    other   What keeps you from being more active? I am as active as I can possbily be       Social History:      11/20/2024     2:01 PM   --   Are you smoking? No   Are you drinking alcohol? No       Medications:  Current Outpatient Medications   Medication Sig Dispense Refill    omeprazole (PRILOSEC) 20 MG DR capsule Take 1 capsule (20 mg) by mouth daily. 90 capsule 3    phentermine 15 MG capsule Take 1 capsule (15 mg) by mouth every morning. 90 capsule 0    Cyanocobalamin (B-12) 1000 MCG TBCR       gabapentin (NEURONTIN) 300 MG capsule Take 1 capsule (300 mg) by mouth 2 times daily AND 1 capsule (300 mg) 3 times daily. Do all this for 14 days. (Patient not taking: Reported on 9/6/2024) 70 capsule 0    ondansetron (ZOFRAN ODT) 4 MG ODT tab Take 1 tablet (4 mg) by mouth every 8 hours as needed for nausea. (Patient not taking: Reported on 9/6/2024) 4 tablet 0    senna-docusate (SENOKOT-S/PERICOLACE) 8.6-50 MG tablet Take 1-2 tablets by mouth 2 times daily. (Patient not taking: Reported on 9/6/2024) 30 tablet 0    Vitamin D3 (CHOLECALCIFEROL) 25 mcg (1000 units) tablet Take by mouth daily       No current facility-administered medications for this visit.         11/20/2024     2:01 PM   --   Do you avoid NSAIDs such as (Ibuprofen, Aleve, Naproxen, Advil)? No       ROS:  GI:       11/20/2024     2:01 PM   --   Vomiting No   Diarrhea No   Constipation No   Swallowing trouble Yes   Abdominal pain No   Heartburn Yes     Skin:       11/20/2024     2:01 PM   BAR RBS ROS - SKIN   Rash in skin folds No     Psych:       11/20/2024     2:01 PM   --   Depression Yes   Anxiety Yes     Female Only:       11/20/2024     2:01 PM   BAR RBS ROS -     Female only Irregular menstrual cycles   Stress urinary incontinence No       Hospital Outpatient Visit on 08/27/2024   Component Date Value Ref Range Status    HCG Qual Urine 08/27/2024 Negative  Negative Final    Internal QC Check POCT 08/27/2024 Valid  Valid Final    POCT Kit Lot Number 08/27/2024 [id number]   Final    POCT Kit Expiration Date 08/27/2024 2026-02-16   Final    Case Report 08/27/2024    Final                    Value:Surgical Pathology Report                         Case: [id number]                                  Authorizing Provider:  HELENA Mack MD  Collected:           08/27/2024 02:00 PM          Ordering Location:     [facility] OR  Received:            08/27/2024 03:19 PM                                 [place]                                                                  Pathologist:           Cee Lambert MD                                                   Specimen:    Other, ABDOMINAL PANNICULUS                                                                Final Diagnosis 08/27/2024    Final                    Value:SOFT TISSUE, ABDOMEN PANNICULUS, EXCISION:                          -Benign skin and subcutaneous tissue, see gross description for details.    Clinical Information 08/27/2024    Final                    Value:Procedure:                          PANNICULECTOMY                          Pre-op Diagnosis: History of obesity [Z86.39]                          Excess skin of abdomen [L98.7]                          Abdominal pannus [E65]                          Post-op Diagnosis: Z86.39 - History of obesity [ICD-10-CM]                          L98.7 - Excess skin of abdomen [ICD-10-CM]                          E65 - Abdominal pannus [ICD-10-CM]    Gross Description 08/27/2024    Final                    Value:A(1). Other, ABDOMINAL PANNICULUS:                          The specimen is received fresh with proper patient identification labeled  "                          \"abdominal; panniculus\".  The specimen consists of a 1010.9 g, 43.2 x 14.0                           cm excised to depth of 4.3 cm portion of tan-white skin and subcutaneous                           soft tissue.  The skin is slightly wrinkled with no lesions or areas of                           discoloration.  The specimen is sectioned revealing yellow-tan, minimally                           fibrotic cut surfaces.  A gross photograph is taken.  No tissue submitted,                           this case is received for gross only.    Microscopic Description 08/27/2024    Final                    Value:No microscopic examination performed (gross examination only).                                                      I have personally reviewed all specimens and or slides, including the                           listed special stains, and used them with my medical judgement to                           determine the final diagnosis.                              Performing Labs 08/27/2024    Final                    Value:The technical component of this testing was completed at [facility].                                                    Stain controls for all stains resulted within this report have been                           reviewed and show appropriate reactivity.               No data to display                  PHYSICAL EXAM:  Objective    Ht 1.6 m (5' 2.99\")   Wt 66.7 kg (147 lb)   BMI 26.05 kg/m           Vitals:  No vitals were obtained today due to virtual visit.    Physical Exam   GENERAL: alert and no distress  EYES: Eyes grossly normal to inspection.  No discharge or erythema, or obvious scleral/conjunctival abnormalities.  RESP: No audible wheeze, cough, or visible cyanosis.    SKIN: Visible skin clear. No significant rash, abnormal pigmentation or lesions.  NEURO: Cranial nerves grossly " intact.  Mentation and speech appropriate for age.  PSYCH: Appropriate affect, tone, and pace of words        Sincerely,    Annette Ram NP      Virtual Visit Details    Phone visit   Originating Location (pt. Location): Home    Distant Location (provider location):  Off-site  Platform used for Video Visit: Teresa

## 2024-11-20 NOTE — LETTER
2024       RE: Karina Rice  [address]  [address]  [facility] [address]     Dear Colleague,    Thank you for referring your patient, Karina Rice, to the [facility]. Please see a copy of my visit note below.    Return Bariatric Surgery Note    RE: Karina Rice  MR#: [id number]  : 1985  VISIT DATE: 2024      Dear Clinic, [facility],    I had the pleasure of seeing your patient, Karina Rice, in my post-bariatric surgery assessment clinic.    Assessment & Plan  Problem List Items Addressed This Visit          Other    S/P laparoscopic sleeve gastrectomy    Relevant Medications    phentermine 15 MG capsule    Overweight (BMI 25.0-29.9)    Relevant Medications    omeprazole (PRILOSEC) 20 MG DR capsule    phentermine 15 MG capsule    History of obesity - Primary     Has been off phentermine since having panniculectomy 2024. Describes difficulty sticking to her eating plan and exercising without taking phentermine. She feels she is able to eat 3 meals a day and adequate protein only if she is taking phentermine. Since being off phentermine she notes she is getting inadequate protein and usually only eating 1 meal a day. Agree to restart phentermine.     Increase protein (60g daily)   Increase water in take (64oz daily )   Restart multivitamin with iron   Continue omeprazole    Restart phentermine   Follow up 3 months          Relevant Medications    omeprazole (PRILOSEC) 20 MG DR capsule    phentermine 15 MG capsule     Other Visit Diagnoses       Gastroesophageal reflux disease, unspecified whether esophagitis present        Relevant Medications    omeprazole (PRILOSEC) 20 MG DR capsule               40 minutes spent by me on the date of the encounter doing chart review, history and exam, documentation and further activities per the note    CHIEF COMPLAINT:  Post-bariatric surgery follow-up. High weight 239lb (BMI 41) . Preop with [facility]. S/p sleeve 5/11/2021 with Dr. Keenan. [place] 138lb. Re-established with clinic 4/2023 weight 151lb (BMI 26) with increasing hunger - started phentermine, discussed going back to bariatric postop recommendations. Last seen 3/2024 with Josselin Clifford PA-C - cont phentermine, restart omeprazole     HISTORY OF PRESENT ILLNESS:      11/20/2024     2:01 PM   Questions Regarding Prior Weight Loss Surgery Reviewed With Patient   I had the following weight loss procedure Sleeve Gastrectomy   What year was your surgery? 2021   How has your weight changed since your last visit? I have gained weight   Do you currently have any of the following Heartburn, acid reflux, or GERD (acid reflux disease)?   Do you have any concerns today? gaining weight after surgery, pt feels like a lot of her weight is fluid weight     Sp panniculectomy 8/30/2024 with Dr. Mack     Anti-obesity medications:     Current:   Phentermine- has been off for a bit - since surgery     Recent diet changes:   Eating 1 times day  Not enough protein   Not enough water     Recent exercise/activity changes:   Work is physical     Recent stressors:   [place] been out of the country   Working a lot     Vitamins/Labs: due 3/2025     GERD symptoms: ran out of omeprazole, heart burn after eating     Weight History:      11/20/2024     2:01 PM   --   What is your highest lifetime weight? 245   What is your lowest weight since surgery? (In pounds) 138     Initial Weight (lbs): 239 lbs ([facility] preop)  Weight: 66.7 kg (147 lb) (no current wt to report)  Last Visits Weight: 68.5 kg (151 lb)  Cumulative weight loss (lbs): 92  Weight Loss Percentage: 38.49%        11/20/2024     2:01 PM   Questions Regarding Co-Morbidities and Health Concerns Reviewed With Patient   Pre-diabetes Never   Diabetes II Never   High Blood Pressure Never   High cholesterol Never   Heartburn/Reflux Improved    Sleep apnea Never   PCOS Never   Back pain Never   Joint pain Never   Lower leg swelling Never           11/20/2024     2:01 PM   Eating Habits   How many meals do you eat per day? 2   Do you snack between meals? Yes   How much food are you eating at each meal? Less than 1/2 cup   Are you able to separate your meals and liquids by at least 30 minutes? No   Are you able to avoid liquid calories? Yes           11/20/2024     2:01 PM   Exercise Questions Reviewed With Patient   How often do you exercise? Daily   What is the duration of your exercise (in minutes)? 60+ Minutes   What types of exercise do you do? walking    other   What keeps you from being more active? I am as active as I can possbily be       Social History:      11/20/2024     2:01 PM   --   Are you smoking? No   Are you drinking alcohol? No       Medications:  Current Outpatient Medications   Medication Sig Dispense Refill     omeprazole (PRILOSEC) 20 MG DR capsule Take 1 capsule (20 mg) by mouth daily. 90 capsule 3     phentermine 15 MG capsule Take 1 capsule (15 mg) by mouth every morning. 90 capsule 0     Cyanocobalamin (B-12) 1000 MCG TBCR        gabapentin (NEURONTIN) 300 MG capsule Take 1 capsule (300 mg) by mouth 2 times daily AND 1 capsule (300 mg) 3 times daily. Do all this for 14 days. (Patient not taking: Reported on 9/6/2024) 70 capsule 0     ondansetron (ZOFRAN ODT) 4 MG ODT tab Take 1 tablet (4 mg) by mouth every 8 hours as needed for nausea. (Patient not taking: Reported on 9/6/2024) 4 tablet 0     senna-docusate (SENOKOT-S/PERICOLACE) 8.6-50 MG tablet Take 1-2 tablets by mouth 2 times daily. (Patient not taking: Reported on 9/6/2024) 30 tablet 0     Vitamin D3 (CHOLECALCIFEROL) 25 mcg (1000 units) tablet Take by mouth daily       No current facility-administered medications for this visit.         11/20/2024     2:01 PM   --   Do you avoid NSAIDs such as (Ibuprofen, Aleve, Naproxen, Advil)? No       ROS:  GI:       11/20/2024      2:01 PM   --   Vomiting No   Diarrhea No   Constipation No   Swallowing trouble Yes   Abdominal pain No   Heartburn Yes     Skin:       11/20/2024     2:01 PM   BAR RBS ROS - SKIN   Rash in skin folds No     Psych:       11/20/2024     2:01 PM   --   Depression Yes   Anxiety Yes     Female Only:       11/20/2024     2:01 PM   BAR RBS ROS -    Female only Irregular menstrual cycles   Stress urinary incontinence No       Hospital Outpatient Visit on 08/27/2024   Component Date Value Ref Range Status     HCG Qual Urine 08/27/2024 Negative  Negative Final     Internal QC Check POCT 08/27/2024 Valid  Valid Final     POCT Kit Lot Number 08/27/2024 [id number]   Final     POCT Kit Expiration Date 08/27/2024 2026-02-16   Final     Case Report 08/27/2024    Final                    Value:Surgical Pathology Report                         Case: [id number]                                  Authorizing Provider:  HELENA Mack MD  Collected:           08/27/2024 02:00 PM          Ordering Location:     [facility] OR  Received:            08/27/2024 03:19 PM                                 [place]                                                                  Pathologist:           Cee Lambert MD                                                   Specimen:    Other, ABDOMINAL PANNICULUS                                                                 Final Diagnosis 08/27/2024    Final                    Value:SOFT TISSUE, ABDOMEN PANNICULUS, EXCISION:                          -Benign skin and subcutaneous tissue, see gross description for details.     Clinical Information 08/27/2024    Final                    Value:Procedure:                          PANNICULECTOMY                          Pre-op Diagnosis: History of obesity [Z86.39]                          Excess skin of abdomen [L98.7]                          Abdominal pannus [E65]                          Post-op Diagnosis: Z86.39 -  "History of obesity [ICD-10-CM]                          L98.7 - Excess skin of abdomen [ICD-10-CM]                          E65 - Abdominal pannus [ICD-10-CM]     Gross Description 08/27/2024    Final                    Value:A(1). Other, ABDOMINAL PANNICULUS:                          The specimen is received fresh with proper patient identification labeled                           \"abdominal; panniculus\".  The specimen consists of a 1010.9 g, 43.2 x 14.0                           cm excised to depth of 4.3 cm portion of tan-white skin and subcutaneous                           soft tissue.  The skin is slightly wrinkled with no lesions or areas of                           discoloration.  The specimen is sectioned revealing yellow-tan, minimally                           fibrotic cut surfaces.  A gross photograph is taken.  No tissue submitted,                           this case is received for gross only.     Microscopic Description 08/27/2024    Final                    Value:No microscopic examination performed (gross examination only).                                                      I have personally reviewed all specimens and or slides, including the                           listed special stains, and used them with my medical judgement to                           determine the final diagnosis.                               Performing Labs 08/27/2024    Final                    Value:The technical component of this testing was completed at [facility].                                                    Stain controls for all stains resulted within this report have been                           reviewed and show appropriate reactivity.               No data to display                  PHYSICAL EXAM:  Objective   Ht 1.6 m (5' 2.99\")   Wt 66.7 kg (147 lb)   BMI 26.05 kg/m           Vitals:  No vitals were obtained " today due to virtual visit.    Physical Exam   GENERAL: alert and no distress  EYES: Eyes grossly normal to inspection.  No discharge or erythema, or obvious scleral/conjunctival abnormalities.  RESP: No audible wheeze, cough, or visible cyanosis.    SKIN: Visible skin clear. No significant rash, abnormal pigmentation or lesions.  NEURO: Cranial nerves grossly intact.  Mentation and speech appropriate for age.  PSYCH: Appropriate affect, tone, and pace of words        Sincerely,    Annette Ram NP      Virtual Visit Details    Phone visit   Originating Location (pt. Location): Home    Distant Location (provider location):  Off-site  Platform used for Video Visit: AmWell          Again, thank you for allowing me to participate in the care of your patient.      Sincerely,    Annette Ram NP

## 2024-11-20 NOTE — PATIENT INSTRUCTIONS
Increase protein (60g daily)   Increase water in take (64oz daily )   Restart multivitamin with iron   Continue omeprazole    Restart phentermine   Follow up 3 months

## 2024-11-22 NOTE — ASSESSMENT & PLAN NOTE
Has been off phentermine since having panniculectomy 8/2024. Describes difficulty sticking to her eating plan and exercising without taking phentermine. She feels she is able to eat 3 meals a day and adequate protein only if she is taking phentermine. Since being off phentermine she notes she is getting inadequate protein and usually only eating 1 meal a day. Agree to restart phentermine.     Increase protein (60g daily)   Increase water in take (64oz daily )   Restart multivitamin with iron   Continue omeprazole    Restart phentermine   Follow up 3 months

## (undated) DEVICE — ANTIFOG SOLUTION W/FOAM PAD 31142527

## (undated) DEVICE — PACK MINOR CUSTOM ASC

## (undated) DEVICE — NDL INSUFFLATION 13GA 150MM C2202

## (undated) DEVICE — Device

## (undated) DEVICE — DRAPE IOBAN INCISE 23X17" 6650EZ

## (undated) DEVICE — LINEN TOWEL PACK X6 WHITE 5487

## (undated) DEVICE — DECANTER BAG 2002S

## (undated) DEVICE — STPL SKIN 35W ROTATING HEAD PRW35

## (undated) DEVICE — PREP CHLORAPREP 26ML TINTED HI-LITE ORANGE 930815

## (undated) DEVICE — SYSTEM CALIBRATION GASTRECTOMY SLEEVE VISIGI 3D 40FR 5240

## (undated) DEVICE — SUCTION MANIFOLD NEPTUNE 2 SYS 1 PORT 702-025-000

## (undated) DEVICE — SPONGE LAP 18X18" X8435

## (undated) DEVICE — SU MONOCRYL 3-0 PS-1 27" Y936H

## (undated) DEVICE — GLOVE BIOGEL PI MICRO SZ 7.0 48570

## (undated) DEVICE — SU STRATAFIX MONOCRYL 3-0 SPIRAL PS-2 45CM SXMP1B107

## (undated) DEVICE — SOL NACL 0.9% IRRIG 500ML BOTTLE 2F7123

## (undated) DEVICE — NDL SPINAL 22GA 3.5" QUINCKE 405181

## (undated) DEVICE — LINEN TOWEL PACK X30 5481

## (undated) DEVICE — ESU GROUND PAD ADULT W/CORD E7507

## (undated) DEVICE — LIGHT HANDLE X1 31140133

## (undated) DEVICE — DRSG ABDOMINAL 07 1/2X8" 7197D

## (undated) DEVICE — LINEN TOWEL PACK X5 5464

## (undated) DEVICE — SYR 30ML LL W/O NDL 302832

## (undated) DEVICE — GLOVE PROTEXIS POWDER FREE SMT 7.5  2D72PT75X

## (undated) DEVICE — ENDO TROCAR OPTICAL ACCESS KII Z-THRD 15X100MM C0R37

## (undated) DEVICE — NEPTUNE SMOKE EVAC ADAPTER

## (undated) DEVICE — PREP CHLORAPREP 26ML TINTED ORANGE  260815

## (undated) DEVICE — CLIP APPLIER 13" LG LIGACLIP MCL20

## (undated) DEVICE — ENDO POUCH UNIVERSAL RETRIEVAL SYSTEM INZII 12/15MM CD004

## (undated) DEVICE — SUCTION MANIFOLD NEPTUNE 2 SYS 4 PORT 0702-020-000

## (undated) DEVICE — NDL COUNTER 20CT 31142493

## (undated) DEVICE — ESU PENCIL SMOKE EVAC W/ROCKER SWITCH 0703-047-000

## (undated) DEVICE — STPL RELOAD REG TISSUE ECHELON 60 X 3.6MM BLUE GST60B

## (undated) DEVICE — ENDO TROCAR SLEEVE KII ADV FIXATION 05X100MM CFS02

## (undated) DEVICE — DRAPE U SPLIT 74X120" 29440

## (undated) DEVICE — FILTER HEPA FLUID TRAP NEPTUNE 0703-040-001

## (undated) DEVICE — SU MONOCRYL 2-0 SH 27" UND Y417H

## (undated) DEVICE — ESU LIGASURE MARYLAND VESSEL LAP 44CM XLONG LF1944

## (undated) DEVICE — PEN MARKING SKIN W/PAPER RULER 31145785

## (undated) DEVICE — PAD CHUX UNDERPAD 30X36" P3036C

## (undated) DEVICE — BLADE KNIFE SURG 10 371110

## (undated) DEVICE — BNDG ABDOMINAL BINDER PROCARE FLANNEL UNIV 12" 79-89091

## (undated) DEVICE — ENDO TROCAR FIRST ENTRY KII FIOS ADV FIX 05X100MM CFF03

## (undated) DEVICE — SUCTION TIP YANKAUER W/O VENT K86

## (undated) DEVICE — STPL POWERED ECHELON LONG 60MM PLEE60A

## (undated) DEVICE — SOL WATER IRRIG 1000ML BOTTLE 2F7114

## (undated) DEVICE — GLOVE BIOGEL PI MICRO INDICATOR UNDERGLOVE SZ 7.5 48975

## (undated) DEVICE — CLIP APPLIER ENDO 5MM M/L LIGAMAX EL5ML

## (undated) DEVICE — COVER CAMERA IN-LIGHT DISP LT-C02

## (undated) DEVICE — SOL NACL 0.9% INJ 1000ML BAG 2B1324X

## (undated) RX ORDER — CEFAZOLIN SODIUM IN 0.9 % NACL 3 G/100 ML
INTRAVENOUS SOLUTION, PIGGYBACK (ML) INTRAVENOUS
Status: DISPENSED
Start: 2021-05-11

## (undated) RX ORDER — FENTANYL CITRATE 50 UG/ML
INJECTION, SOLUTION INTRAMUSCULAR; INTRAVENOUS
Status: DISPENSED
Start: 2021-05-11

## (undated) RX ORDER — PROPOFOL 10 MG/ML
INJECTION, EMULSION INTRAVENOUS
Status: DISPENSED
Start: 2024-08-27

## (undated) RX ORDER — BUPIVACAINE HYDROCHLORIDE 2.5 MG/ML
INJECTION, SOLUTION EPIDURAL; INFILTRATION; INTRACAUDAL
Status: DISPENSED
Start: 2024-08-27

## (undated) RX ORDER — FENTANYL CITRATE 50 UG/ML
INJECTION, SOLUTION INTRAMUSCULAR; INTRAVENOUS
Status: DISPENSED
Start: 2024-08-27

## (undated) RX ORDER — CEFAZOLIN SODIUM 2 G/50ML
SOLUTION INTRAVENOUS
Status: DISPENSED
Start: 2024-08-27

## (undated) RX ORDER — OXYCODONE HYDROCHLORIDE 5 MG/1
TABLET ORAL
Status: DISPENSED
Start: 2024-08-27

## (undated) RX ORDER — DEXAMETHASONE SODIUM PHOSPHATE 4 MG/ML
INJECTION, SOLUTION INTRA-ARTICULAR; INTRALESIONAL; INTRAMUSCULAR; INTRAVENOUS; SOFT TISSUE
Status: DISPENSED
Start: 2024-08-27

## (undated) RX ORDER — OXYCODONE HYDROCHLORIDE 5 MG/1
TABLET ORAL
Status: DISPENSED
Start: 2021-05-11

## (undated) RX ORDER — ACETAMINOPHEN 325 MG/1
TABLET ORAL
Status: DISPENSED
Start: 2024-08-27

## (undated) RX ORDER — ONDANSETRON 2 MG/ML
INJECTION INTRAMUSCULAR; INTRAVENOUS
Status: DISPENSED
Start: 2024-08-27

## (undated) RX ORDER — CEFAZOLIN SODIUM 1 G/3ML
INJECTION, POWDER, FOR SOLUTION INTRAMUSCULAR; INTRAVENOUS
Status: DISPENSED
Start: 2024-08-27